# Patient Record
Sex: FEMALE | Race: WHITE | Employment: FULL TIME | URBAN - NONMETROPOLITAN AREA
[De-identification: names, ages, dates, MRNs, and addresses within clinical notes are randomized per-mention and may not be internally consistent; named-entity substitution may affect disease eponyms.]

---

## 2017-03-06 ENCOUNTER — HOSPITAL ENCOUNTER (OUTPATIENT)
Dept: WOMENS IMAGING | Age: 47
Discharge: HOME | End: 2017-03-06
Payer: COMMERCIAL

## 2017-03-06 DIAGNOSIS — Z12.31 ENCOUNTER FOR SCREENING MAMMOGRAM FOR MALIGNANT NEOPLASM OF BREAST: ICD-10-CM

## 2017-03-06 PROCEDURE — 77063 BREAST TOMOSYNTHESIS BI: CPT

## 2017-12-06 ENCOUNTER — OFFICE VISIT (OUTPATIENT)
Dept: RETAIL CLINIC | Facility: CLINIC | Age: 47
End: 2017-12-06

## 2017-12-06 VITALS
WEIGHT: 171 LBS | SYSTOLIC BLOOD PRESSURE: 118 MMHG | TEMPERATURE: 97.7 F | RESPIRATION RATE: 20 BRPM | OXYGEN SATURATION: 98 % | HEIGHT: 70 IN | HEART RATE: 68 BPM | BODY MASS INDEX: 24.48 KG/M2 | DIASTOLIC BLOOD PRESSURE: 68 MMHG

## 2017-12-06 DIAGNOSIS — J06.9 VIRAL UPPER RESPIRATORY TRACT INFECTION: Primary | ICD-10-CM

## 2017-12-06 LAB
EXPIRATION DATE: NORMAL
FLUAV AG NPH QL: NEGATIVE
FLUBV AG NPH QL: NEGATIVE
INTERNAL CONTROL: NORMAL
Lab: NORMAL

## 2017-12-06 PROCEDURE — 99203 OFFICE O/P NEW LOW 30 MIN: CPT | Performed by: NURSE PRACTITIONER

## 2017-12-06 PROCEDURE — 87804 INFLUENZA ASSAY W/OPTIC: CPT | Performed by: NURSE PRACTITIONER

## 2017-12-06 RX ORDER — HYDROXYCHLOROQUINE SULFATE 200 MG/1
200 TABLET, FILM COATED ORAL NIGHTLY
COMMUNITY

## 2017-12-06 RX ORDER — BROMPHENIRAMINE MALEATE, PSEUDOEPHEDRINE HYDROCHLORIDE, AND DEXTROMETHORPHAN HYDROBROMIDE 2; 30; 10 MG/5ML; MG/5ML; MG/5ML
5 SYRUP ORAL 4 TIMES DAILY PRN
Qty: 118 ML | Refills: 0 | Status: SHIPPED | OUTPATIENT
Start: 2017-12-06 | End: 2018-05-14

## 2017-12-06 NOTE — PATIENT INSTRUCTIONS
"Upper Respiratory Infection, Adult  Most upper respiratory infections (URIs) are a viral infection of the air passages leading to the lungs. A URI affects the nose, throat, and upper air passages. The most common type of URI is nasopharyngitis and is typically referred to as \"the common cold.\"  URIs run their course and usually go away on their own. Most of the time, a URI does not require medical attention, but sometimes a bacterial infection in the upper airways can follow a viral infection. This is called a secondary infection. Sinus and middle ear infections are common types of secondary upper respiratory infections.  Bacterial pneumonia can also complicate a URI. A URI can worsen asthma and chronic obstructive pulmonary disease (COPD). Sometimes, these complications can require emergency medical care and may be life threatening.   CAUSES  Almost all URIs are caused by viruses. A virus is a type of germ and can spread from one person to another.   RISKS FACTORS  You may be at risk for a URI if:   · You smoke.    · You have chronic heart or lung disease.  · You have a weakened defense (immune) system.    · You are very young or very old.    · You have nasal allergies or asthma.  · You work in crowded or poorly ventilated areas.  · You work in health care facilities or schools.  SIGNS AND SYMPTOMS   Symptoms typically develop 2-3 days after you come in contact with a cold virus. Most viral URIs last 7-10 days. However, viral URIs from the influenza virus (flu virus) can last 14-18 days and are typically more severe. Symptoms may include:   · Runny or stuffy (congested) nose.    · Sneezing.    · Cough.    · Sore throat.    · Headache.    · Fatigue.    · Fever.    · Loss of appetite.    · Pain in your forehead, behind your eyes, and over your cheekbones (sinus pain).  · Muscle aches.    DIAGNOSIS   Your health care provider may diagnose a URI by:  · Physical exam.  · Tests to check that your symptoms are not due to " another condition such as:  ¨ Strep throat.  ¨ Sinusitis.  ¨ Pneumonia.  ¨ Asthma.  TREATMENT   A URI goes away on its own with time. It cannot be cured with medicines, but medicines may be prescribed or recommended to relieve symptoms. Medicines may help:  · Reduce your fever.  · Reduce your cough.  · Relieve nasal congestion.  HOME CARE INSTRUCTIONS   · Take medicines only as directed by your health care provider.    · Gargle warm saltwater or take cough drops to comfort your throat as directed by your health care provider.  · Use a warm mist humidifier or inhale steam from a shower to increase air moisture. This may make it easier to breathe.  · Drink enough fluid to keep your urine clear or pale yellow.    · Eat soups and other clear broths and maintain good nutrition.    · Rest as needed.    · Return to work when your temperature has returned to normal or as your health care provider advises. You may need to stay home longer to avoid infecting others. You can also use a face mask and careful hand washing to prevent spread of the virus.  · Increase the usage of your inhaler if you have asthma.    · Do not use any tobacco products, including cigarettes, chewing tobacco, or electronic cigarettes. If you need help quitting, ask your health care provider.  PREVENTION   The best way to protect yourself from getting a cold is to practice good hygiene.   · Avoid oral or hand contact with people with cold symptoms.    · Wash your hands often if contact occurs.    There is no clear evidence that vitamin C, vitamin E, echinacea, or exercise reduces the chance of developing a cold. However, it is always recommended to get plenty of rest, exercise, and practice good nutrition.   SEEK MEDICAL CARE IF:   · You are getting worse rather than better.    · Your symptoms are not controlled by medicine.    · You have chills.  · You have worsening shortness of breath.  · You have brown or red mucus.  · You have yellow or brown nasal  discharge.  · You have pain in your face, especially when you bend forward.  · You have a fever.  · You have swollen neck glands.  · You have pain while swallowing.  · You have white areas in the back of your throat.  SEEK IMMEDIATE MEDICAL CARE IF:   · You have severe or persistent:    Headache.    Ear pain.    Sinus pain.    Chest pain.  · You have chronic lung disease and any of the following:    Wheezing.    Prolonged cough.    Coughing up blood.    A change in your usual mucus.  · You have a stiff neck.  · You have changes in your:    Vision.    Hearing.    Thinking.    Mood.  MAKE SURE YOU:   · Understand these instructions.  · Will watch your condition.  · Will get help right away if you are not doing well or get worse.     This information is not intended to replace advice given to you by your health care provider. Make sure you discuss any questions you have with your health care provider.     Document Released: 06/13/2002 Document Revised: 05/03/2016 Document Reviewed: 03/25/2015  [employer] Interactive Patient Education ©2017 [employer]    Follow up if symptoms worsen or persist.

## 2017-12-06 NOTE — PROGRESS NOTES
Chief Complaint   Patient presents with   • URI     Subjective   Yusra Fisher is a 47 y.o. female who presents to the clinic today with complaints cold symptoms. She reports the worse symptoms if throat pain feels like glass and needs something to help the pain. The chloraseptic did not help pain.   URI    This is a new problem. The current episode started yesterday. The problem has been gradually worsening. There has been no fever. Associated symptoms include ear pain, headaches, nausea, a plugged ear sensation, rhinorrhea, sinus pain and a sore throat. Pertinent negatives include no abdominal pain, chest pain, coughing, diarrhea, dysuria, joint pain, joint swelling, neck pain, sneezing, swollen glands, vomiting or wheezing. She has tried NSAIDs and decongestant for the symptoms. The treatment provided mild relief.         Current Outpatient Prescriptions:   •  hydroxychloroquine (PLAQUENIL) 200 MG tablet, Take 200 mg by mouth Daily., Disp: , Rfl:   •  brompheniramine-pseudoephedrine-DM 30-2-10 MG/5ML syrup, Take 5 mL by mouth 4 (Four) Times a Day As Needed for Congestion or Cough., Disp: 118 mL, Rfl: 0  •  lidocaine viscous (XYLOCAINE) 2 % solution, Take 5 mL by mouth As Needed for Mild Pain  or Moderate Pain . Gargle and swallow., Disp: 100 mL, Rfl: 0  •  progesterone (ENDOMETRIN) 100 MG vaginal insert, , Disp: , Rfl:   •  Thyroid 260 MG tablet, Take  by mouth., Disp: , Rfl:     Allergies:  Penicillins    Past Medical History:   Diagnosis Date   • Arthritis    • Disease of thyroid gland    • Hypothyroidism    • RA (rheumatoid arthritis)      History reviewed. No pertinent surgical history.  Family History   Problem Relation Age of Onset   • No Known Problems Mother    • Diabetes Father    • Cancer Maternal Grandfather      Social History   Substance Use Topics   • Smoking status: Never Smoker   • Smokeless tobacco: Never Used   • Alcohol use No       Review of Systems  Review of Systems   HENT: Positive for  "ear pain, rhinorrhea, sinus pain and sore throat. Negative for sneezing.    Respiratory: Negative for cough and wheezing.    Cardiovascular: Negative for chest pain.   Gastrointestinal: Positive for nausea. Negative for abdominal pain, diarrhea and vomiting.   Genitourinary: Negative for dysuria.   Musculoskeletal: Negative for joint pain and neck pain.   Neurological: Positive for headaches.       Objective   /68 (BP Location: Left arm, Patient Position: Sitting, Cuff Size: Adult)  Pulse 68  Temp 97.7 °F (36.5 °C) (Oral)   Resp 20  Ht 177.8 cm (70\")  Wt 77.6 kg (171 lb)  LMP 08/06/2017  SpO2 98%  Breastfeeding? No  BMI 24.54 kg/m2      Physical Exam   Constitutional: She is oriented to person, place, and time. She appears well-developed and well-nourished.   HENT:   Head: Normocephalic and atraumatic.   Right Ear: Hearing, external ear and ear canal normal. Tympanic membrane is bulging.   Left Ear: Hearing, external ear and ear canal normal. Tympanic membrane is bulging.   Nose: Mucosal edema and rhinorrhea present. Right sinus exhibits no maxillary sinus tenderness and no frontal sinus tenderness. Left sinus exhibits no maxillary sinus tenderness and no frontal sinus tenderness.   Mouth/Throat: Uvula is midline and mucous membranes are normal. Posterior oropharyngeal erythema (large amount thin clear secretions. ) present. No oropharyngeal exudate or posterior oropharyngeal edema. Tonsils are 1+ on the right. Tonsils are 1+ on the left. No tonsillar exudate.   Eyes: Pupils are equal, round, and reactive to light.   Neck: Normal range of motion. Neck supple.   Cardiovascular: Normal rate, regular rhythm and normal heart sounds.  Exam reveals no gallop and no friction rub.    No murmur heard.  Pulmonary/Chest: Effort normal and breath sounds normal. No stridor. No respiratory distress. She has no wheezes. She has no rales. She exhibits no tenderness.   Lymphadenopathy:     She has no cervical " adenopathy.   Neurological: She is alert and oriented to person, place, and time.   Skin: Skin is warm and dry.   Psychiatric: She has a normal mood and affect. Her behavior is normal.   Vitals reviewed.      Assessment/Plan     Yusra was seen today for uri.    Diagnoses and all orders for this visit:    Viral upper respiratory tract infection  -     POCT Influenza A/B    Other orders  -     lidocaine viscous (XYLOCAINE) 2 % solution; Take 5 mL by mouth As Needed for Mild Pain  or Moderate Pain . Gargle and swallow.  -     brompheniramine-pseudoephedrine-DM 30-2-10 MG/5ML syrup; Take 5 mL by mouth 4 (Four) Times a Day As Needed for Congestion or Cough.  Negative flu test   Follow up if symptoms worsne or persist

## 2018-03-12 ENCOUNTER — HOSPITAL ENCOUNTER (OUTPATIENT)
Dept: WOMENS IMAGING | Age: 48
Discharge: HOME | End: 2018-03-12
Payer: COMMERCIAL

## 2018-03-12 DIAGNOSIS — Z12.31 ENCOUNTER FOR SCREENING MAMMOGRAM FOR BREAST CANCER: ICD-10-CM

## 2018-03-12 PROCEDURE — 77063 BREAST TOMOSYNTHESIS BI: CPT

## 2018-03-14 ENCOUNTER — OFFICE VISIT (OUTPATIENT)
Dept: RETAIL CLINIC | Facility: CLINIC | Age: 48
End: 2018-03-14

## 2018-03-14 VITALS
DIASTOLIC BLOOD PRESSURE: 64 MMHG | SYSTOLIC BLOOD PRESSURE: 114 MMHG | BODY MASS INDEX: 23.05 KG/M2 | HEIGHT: 70 IN | OXYGEN SATURATION: 98 % | HEART RATE: 84 BPM | RESPIRATION RATE: 16 BRPM | TEMPERATURE: 97.6 F | WEIGHT: 161 LBS

## 2018-03-14 DIAGNOSIS — H69.83 EUSTACHIAN TUBE DYSFUNCTION, BILATERAL: Primary | ICD-10-CM

## 2018-03-14 PROCEDURE — 99213 OFFICE O/P EST LOW 20 MIN: CPT | Performed by: NURSE PRACTITIONER

## 2018-03-14 PROCEDURE — 96372 THER/PROPH/DIAG INJ SC/IM: CPT | Performed by: NURSE PRACTITIONER

## 2018-03-14 RX ORDER — DEXAMETHASONE SODIUM PHOSPHATE 4 MG/ML
8 INJECTION, SOLUTION INTRA-ARTICULAR; INTRALESIONAL; INTRAMUSCULAR; INTRAVENOUS; SOFT TISSUE ONCE
Status: COMPLETED | OUTPATIENT
Start: 2018-03-14 | End: 2018-03-14

## 2018-03-14 RX ADMIN — DEXAMETHASONE SODIUM PHOSPHATE 8 MG: 4 INJECTION, SOLUTION INTRA-ARTICULAR; INTRALESIONAL; INTRAMUSCULAR; INTRAVENOUS; SOFT TISSUE at 10:15

## 2018-03-14 NOTE — PATIENT INSTRUCTIONS
Eustachian Tube Dysfunction  The eustachian tube connects the middle ear to the back of the nose. It regulates air pressure in the middle ear by allowing air to move between the ear and nose. It also helps to drain fluid from the middle ear space. When the eustachian tube does not function properly, air pressure, fluid, or both can build up in the middle ear.  Eustachian tube dysfunction can affect one or both ears.  What are the causes?  This condition happens when the eustachian tube becomes blocked or cannot open normally. This may result from:  · Ear infections.  · Colds and other upper respiratory infections.  · Allergies.  · Irritation, such as from cigarette smoke or acid from the stomach coming up into the esophagus (gastroesophageal reflux).  · Sudden changes in air pressure, such as from descending in an airplane.  · Abnormal growths in the nose or throat, such as nasal polyps, tumors, or enlarged tissue at the back of the throat (adenoids).  What increases the risk?  This condition may be more likely to develop in people who smoke and people who are overweight. Eustachian tube dysfunction may also be more likely to develop in children, especially children who have:  · Certain birth defects of the mouth, such as cleft palate.  · Large tonsils and adenoids.  What are the signs or symptoms?  Symptoms of this condition may include:  · A feeling of fullness in the ear.  · Ear pain.  · Clicking or popping noises in the ear.  · Ringing in the ear.  · Hearing loss.  · Loss of balance.  Symptoms may get worse when the air pressure around you changes, such as when you travel to an area of high elevation or fly on an airplane.  How is this diagnosed?  This condition may be diagnosed based on:  · Your symptoms.  · A physical exam of your ear, nose, and throat.  · Tests, such as those that measure:  ¨ The movement of your eardrum (tympanogram).  ¨ Your hearing (audiometry).  How is this treated?  Treatment depends on  "the cause and severity of your condition. If your symptoms are mild, you may be able to relieve your symptoms by moving air into (\"popping\") your ears. If you have symptoms of fluid in your ears, treatment may include:  · Decongestants.  · Antihistamines.  · Nasal sprays or ear drops that contain medicines that reduce swelling (steroids).  In some cases, you may need to have a procedure to drain the fluid in your eardrum (myringotomy). In this procedure, a small tube is placed in the eardrum to:  · Drain the fluid.  · Restore the air in the middle ear space.  Follow these instructions at home:  · Take over-the-counter and prescription medicines only as told by your health care provider.  · Use techniques to help pop your ears as recommended by your health care provider. These may include:  ¨ Chewing gum.  ¨ Yawning.  ¨ Frequent, forceful swallowing.  ¨ Closing your mouth, holding your nose closed, and gently blowing as if you are trying to blow air out of your nose.  · Do not do any of the following until your health care provider approves:  ¨ Travel to high altitudes.  ¨ Fly in airplanes.  ¨ Work in a pressurized cabin or room.  ¨ Scuba dive.  · Keep your ears dry. Dry your ears completely after showering or bathing.  · Do not smoke.  · Keep all follow-up visits as told by your health care provider. This is important.  Contact a health care provider if:  · Your symptoms do not go away after treatment.  · Your symptoms come back after treatment.  · You are unable to pop your ears.  · You have:  ¨ A fever.  ¨ Pain in your ear.  ¨ Pain in your head or neck.  ¨ Fluid draining from your ear.  · Your hearing suddenly changes.  · You become very dizzy.  · You lose your balance.  This information is not intended to replace advice given to you by your health care provider. Make sure you discuss any questions you have with your health care provider.  Document Released: 01/13/2017 Document Revised: 05/25/2017 Document " Reviewed: 01/06/2016  [employer] Interactive Patient Education © 2017 [employer]

## 2018-03-14 NOTE — PROGRESS NOTES
"Subjective   Yusra Fisher is a 48 y.o. female.     Ear Fullness    There is pain in both ears. This is a new problem. The current episode started 1 to 4 weeks ago (2 weeks). The problem occurs constantly. The problem has been unchanged. There has been no fever. Associated symptoms include hearing loss (feels full; popping in and out). Pertinent negatives include no coughing, ear discharge, headaches, neck pain or sore throat. Treatments tried: Aleve D for sinus. The treatment provided no relief.        The following portions of the patient's history were reviewed and updated as appropriate: allergies, current medications, past family history, past medical history, past social history, past surgical history and problem list.    Review of Systems   Constitutional: Negative for fever.   HENT: Positive for hearing loss (feels full; popping in and out). Negative for congestion, ear discharge, ear pain, facial swelling and sore throat.    Eyes: Negative for visual disturbance.   Respiratory: Negative for cough.    Cardiovascular: Negative for chest pain.   Musculoskeletal: Negative for neck pain and neck stiffness.   Neurological: Negative for dizziness and headaches.       Objective      /64   Pulse 84   Temp 97.6 °F (36.4 °C) (Oral)   Resp 16   Ht 177.8 cm (70\")   Wt 73 kg (161 lb)   SpO2 98%   BMI 23.10 kg/m²     Physical Exam   Constitutional: She is oriented to person, place, and time. She appears well-developed and well-nourished.   HENT:   Head: Normocephalic.   Right Ear: External ear normal. Decreased hearing is noted.   Left Ear: External ear normal.   Nose: Nose normal.   Mouth/Throat: Oropharynx is clear and moist.   Right TM dull, but not infected   Eyes: Conjunctivae are normal. Pupils are equal, round, and reactive to light.   Neck: Normal range of motion. Neck supple.   Cardiovascular: Normal rate.    Pulmonary/Chest: Effort normal.   Musculoskeletal: Normal range of motion. "   Lymphadenopathy:     She has no cervical adenopathy.   Neurological: She is alert and oriented to person, place, and time.   Skin: Skin is warm and dry. Capillary refill takes less than 2 seconds.   Psychiatric: She has a normal mood and affect. Her behavior is normal. Judgment and thought content normal.   Nursing note and vitals reviewed.        Assessment/Plan   Yusra was seen today for ear fullness.    Diagnoses and all orders for this visit:    Eustachian tube dysfunction, bilateral  -     dexamethasone (DECADRON) injection 8 mg; Inject 2 mL into the shoulder, thigh, or buttocks 1 (One) Time.      Continue Aleve D for sudafed component.  Add flonase.  Return to see your Primary Care Provider if not improved in 1 week.    LISA Torres

## 2018-05-14 ENCOUNTER — PROCEDURE VISIT (OUTPATIENT)
Dept: OTOLARYNGOLOGY | Facility: CLINIC | Age: 48
End: 2018-05-14

## 2018-05-14 ENCOUNTER — OFFICE VISIT (OUTPATIENT)
Dept: OTOLARYNGOLOGY | Facility: CLINIC | Age: 48
End: 2018-05-14

## 2018-05-14 VITALS
BODY MASS INDEX: 23.19 KG/M2 | SYSTOLIC BLOOD PRESSURE: 118 MMHG | DIASTOLIC BLOOD PRESSURE: 70 MMHG | WEIGHT: 162 LBS | TEMPERATURE: 97.8 F | HEIGHT: 70 IN

## 2018-05-14 DIAGNOSIS — H90.5 SNHL (SENSORY-NEURAL HEARING LOSS), UNILATERAL: Primary | ICD-10-CM

## 2018-05-14 DIAGNOSIS — H90.42 SENSORINEURAL HEARING LOSS (SNHL) OF LEFT EAR WITH UNRESTRICTED HEARING OF RIGHT EAR: Primary | ICD-10-CM

## 2018-05-14 DIAGNOSIS — H69.83 DYSFUNCTION OF BOTH EUSTACHIAN TUBES: ICD-10-CM

## 2018-05-14 PROBLEM — H69.93 DYSFUNCTION OF BOTH EUSTACHIAN TUBES: Status: ACTIVE | Noted: 2018-05-14

## 2018-05-14 PROCEDURE — 92553 AUDIOMETRY AIR & BONE: CPT | Performed by: PHYSICIAN ASSISTANT

## 2018-05-14 PROCEDURE — 92567 TYMPANOMETRY: CPT | Performed by: PHYSICIAN ASSISTANT

## 2018-05-14 PROCEDURE — 99203 OFFICE O/P NEW LOW 30 MIN: CPT | Performed by: PHYSICIAN ASSISTANT

## 2018-05-14 RX ORDER — MONTELUKAST SODIUM 10 MG/1
TABLET ORAL
COMMUNITY
Start: 2018-05-05 | End: 2018-05-14

## 2018-05-14 RX ORDER — AZELASTINE 1 MG/ML
2 SPRAY, METERED NASAL 2 TIMES DAILY
Qty: 30 ML | Refills: 11 | Status: SHIPPED | OUTPATIENT
Start: 2018-05-14 | End: 2019-05-17 | Stop reason: SDUPTHER

## 2018-05-14 RX ORDER — FLUTICASONE PROPIONATE 50 MCG
2 SPRAY, SUSPENSION (ML) NASAL DAILY
Qty: 16 G | Refills: 11 | Status: SHIPPED | OUTPATIENT
Start: 2018-05-14 | End: 2019-05-17 | Stop reason: SDUPTHER

## 2018-05-14 NOTE — PROGRESS NOTES
YOB: 1970  Location: [facility]  Location Address: [address] [place] [address], [facility], [address] [place] [address]  Location Phone: [phone]    Chief Complaint   Patient presents with   • Ear Problem       History of Present Illness  Yusra Fisher is a 48 y.o. female.  Yusra Fisher is here for evaluation of ENT complaints. The patient has had problems with ear fullness, popping and cracking of the ear and muffled hearing  The symptoms are localized to both ears. The patient has had variable symptoms. The symptoms have been present for the last several months The symptoms are aggravated by  no identifiable factors. The symptoms are improved by no identifiable factors.          Progress Notes  Encounter Date: 2018 9:30 AM  Ronit Avina   Audiology      []Manual[]Template  [x]Copied  [x]Ronit Avina    []Hover for attribution information            Past Medical History:   Diagnosis Date   • Arthritis    • Disease of thyroid gland    • Hypothyroidism    • RA (rheumatoid arthritis)        History reviewed. No pertinent surgical history.    Outpatient Prescriptions Marked as Taking for the 18 encounter (Office Visit) with SARA Helm   Medication Sig Dispense Refill   • hydroxychloroquine (PLAQUENIL) 200 MG tablet Take 200 mg by mouth Daily.     • progesterone (ENDOMETRIN) 100 MG vaginal insert      • Thyroid 260 MG tablet Take  by mouth.     • [DISCONTINUED] montelukast (SINGULAIR) 10 MG tablet          Penicillins    Family History   Problem Relation Age of Onset   • No Known Problems Mother    • Diabetes Father    • Cancer Maternal Grandfather        Social History     Social History   • Marital status:      Spouse name: N/A   • Number of children: N/A   • Years of education: N/A     Occupational History   • Not on file.     Social History Main Topics   • Smoking status: Never Smoker   • Smokeless tobacco: Never Used   • Alcohol use No   • Drug use: No   • Sexual  activity: Yes      Comment: [employer]     Other Topics Concern   • Not on file     Social History Narrative   • No narrative on file       Review of Systems   Constitutional: Negative for activity change, appetite change, chills, diaphoresis, fatigue, fever and unexpected weight change.   HENT: Positive for hearing loss (intermittent muffled hearing, fullness, popping and cracking). Negative for congestion, dental problem, drooling, ear discharge, ear pain, facial swelling, mouth sores, nosebleeds, postnasal drip, rhinorrhea, sinus pressure, sneezing, sore throat, tinnitus, trouble swallowing and voice change.    Eyes: Negative.    Respiratory: Negative.    Cardiovascular: Negative.    Gastrointestinal: Negative.    Endocrine: Negative.    Skin: Negative.    Allergic/Immunologic: Negative for environmental allergies, food allergies and immunocompromised state.   Neurological: Negative.    Hematological: Negative.    Psychiatric/Behavioral: Negative.        Vitals:    05/14/18 1003   BP: 118/70   Temp: 97.8 °F (36.6 °C)       Body mass index is 23.24 kg/m².    Objective     Physical Exam  CONSTITUTIONAL: well nourished, alert, oriented, in no acute distress     COMMUNICATION AND VOICE: able to communicate normally, normal voice quality    HEAD: normocephalic, no lesions, atraumatic, no tenderness, no masses     FACE: appearance normal, no lesions, no tenderness, no deformities, facial motion symmetric    SALIVARY GLANDS: parotid glands with no tenderness, no swelling, no masses, submandibular glands with normal size, nontender    EYES: ocular motility normal, eyelids normal, orbits normal, no proptosis, conjunctiva normal , pupils equal, round     EARS:  Hearing: response to conversational voice normal bilaterally   External Ears: auricles without lesions  Otoscopic: tympanic membrane appearance normal, no lesions, no perforation, normal mobility, no fluid    NOSE:  External Nose: structure normal, no tenderness on  palpation, no nasal discharge, no lesions, no evidence of trauma, nostrils patent   Intranasal Exam: nasal mucosa normal, vestibule within normal limits, inferior turbinate normal, nasal septum midline   Nasopharynx:     ORAL:  Lips: upper and lower lips without lesion   Teeth: dentition within normal limits for age   Gums: gingivae healthy   Oral Mucosa: oral mucosa normal, no mucosal lesions   Floor of Mouth: Warthin’s duct patent, mucosa normal  Tongue: lingual mucosa normal without lesions, normal tongue mobility   Palate: soft and hard palates with normal mucosa and structure  Oropharynx: oropharyngeal mucosa normal    NECK: neck appearance normal, no mass,  noted without erythema or tenderness    THYROID: no overt thyromegaly, no tenderness, nodules or mass present on palpation, position midline     LYMPH NODES: no lymphadenopathy    CHEST/RESPIRATORY: respiratory effort normal, normal breath sounds     CARDIOVASCULAR: rate and rhythm normal, extremities without cyanosis or edema      NEUROLOGIC/PSYCHIATRIC: oriented to time, place and person, mood normal, affect appropriate, CN II-XII intact grossly    Assessment/Plan   Problems Addressed this Visit        Nervous and Auditory    Dysfunction of both eustachian tubes    Relevant Medications    fluticasone (FLONASE) 50 MCG/ACT nasal spray    azelastine (ASTELIN) 0.1 % nasal spray    Other Relevant Orders    Comprehensive Hearing Test       Other    Sensorineural hearing loss (SNHL) of left ear with unrestricted hearing of right ear - Primary    Relevant Orders    Comprehensive Hearing Test      Other Visit Diagnoses    None.       * Surgery not found *  Orders Placed This Encounter   Procedures   • Comprehensive Hearing Test     Standing Status:   Future     Standing Expiration Date:   5/15/2019     Order Specific Question:   Laterality     Answer:   Bilateral     Return in about 1 year (around 5/14/2019) for Recheck ears with audiogram.       Patient  Instructions   Will start Flonase and Astelin, follow-up in one year with audiogram. If symptoms worsen advised to call and try to have patient come in during symptoms.

## 2018-05-14 NOTE — PATIENT INSTRUCTIONS
Will start Flonase and Astelin, follow-up in one year with audiogram. If symptoms worsen advised to call and try to have patient come in during symptoms.

## 2018-05-16 ENCOUNTER — TELEPHONE (OUTPATIENT)
Dept: OBGYN | Age: 48
End: 2018-05-16

## 2018-05-18 ENCOUNTER — TELEPHONE (OUTPATIENT)
Dept: OBGYN | Age: 48
End: 2018-05-18

## 2018-11-09 ENCOUNTER — OFFICE VISIT (OUTPATIENT)
Dept: RETAIL CLINIC | Facility: CLINIC | Age: 48
End: 2018-11-09

## 2018-11-09 VITALS
OXYGEN SATURATION: 98 % | HEART RATE: 75 BPM | TEMPERATURE: 98.6 F | SYSTOLIC BLOOD PRESSURE: 124 MMHG | DIASTOLIC BLOOD PRESSURE: 72 MMHG

## 2018-11-09 DIAGNOSIS — J06.9 ACUTE URI: Primary | ICD-10-CM

## 2018-11-09 PROCEDURE — 87804 INFLUENZA ASSAY W/OPTIC: CPT | Performed by: NURSE PRACTITIONER

## 2018-11-09 PROCEDURE — 99213 OFFICE O/P EST LOW 20 MIN: CPT | Performed by: NURSE PRACTITIONER

## 2018-11-09 RX ORDER — AZITHROMYCIN 250 MG/1
TABLET, FILM COATED ORAL
Qty: 6 TABLET | Refills: 0 | Status: SHIPPED | OUTPATIENT
Start: 2018-11-09 | End: 2019-09-27

## 2018-11-09 RX ORDER — BENZONATATE 100 MG/1
100 CAPSULE ORAL 3 TIMES DAILY PRN
Qty: 15 CAPSULE | Refills: 0 | Status: SHIPPED | OUTPATIENT
Start: 2018-11-09 | End: 2019-09-27

## 2018-11-09 RX ORDER — CETIRIZINE HYDROCHLORIDE, PSEUDOEPHEDRINE HYDROCHLORIDE 5; 120 MG/1; MG/1
1 TABLET, FILM COATED, EXTENDED RELEASE ORAL 2 TIMES DAILY
Qty: 28 TABLET | Refills: 0 | Status: SHIPPED | OUTPATIENT
Start: 2018-11-09 | End: 2018-11-23

## 2018-11-09 NOTE — PROGRESS NOTES
Subjective   Yusra Fisher is a 48 y.o. female.     Sore Throat    This is a new problem. The current episode started in the past 7 days (started on Monday). The problem has been gradually worsening. There has been no fever. Associated symptoms include congestion (Yellow this morning; Little blood), coughing and ear pain. Pertinent negatives include no diarrhea or vomiting. Treatments tried: Mucinex-DM; nasal spray.    Patient has a premature grandson that lives with her and is wanting to make sure she isn't contagious.     The following portions of the patient's history were reviewed and updated as appropriate: allergies, current medications, past family history, past medical history, past social history, past surgical history and problem list.    Review of Systems   Constitutional: Negative for chills and fever.   HENT: Positive for congestion (Yellow this morning; Little blood), ear pain, postnasal drip and sore throat.    Respiratory: Positive for cough.    Gastrointestinal: Negative for diarrhea, nausea and vomiting.   Musculoskeletal: Positive for myalgias (This morning when she woke up).   Allergic/Immunologic: Negative for environmental allergies.       Objective   Physical Exam   Constitutional: She appears well-developed and well-nourished. She does not appear ill. No distress.   HENT:   Right Ear: External ear normal. Tympanic membrane is scarred. Tympanic membrane is not erythematous.   Left Ear: External ear normal. Tympanic membrane is scarred. Tympanic membrane is not erythematous.   Nose: Right sinus exhibits no maxillary sinus tenderness and no frontal sinus tenderness. Left sinus exhibits no maxillary sinus tenderness and no frontal sinus tenderness.   Mouth/Throat: No oropharyngeal exudate or posterior oropharyngeal erythema. Tonsils are 1+ on the right. Tonsils are 1+ on the left. No tonsillar exudate.   Neck: Neck supple.   Cardiovascular: Normal rate, regular rhythm and normal heart sounds.   Exam reveals no gallop and no friction rub.    No murmur heard.  Pulmonary/Chest: Effort normal and breath sounds normal. No respiratory distress. She has no decreased breath sounds. She has no wheezes. She has no rhonchi. She has no rales. She exhibits no tenderness.   Lymphadenopathy:     She has no cervical adenopathy.   Neurological: She is alert.   Skin: Skin is warm and dry. She is not diaphoretic.   Psychiatric: She has a normal mood and affect. Her behavior is normal.         Assessment/Plan   Yusra was seen today for sore throat.    Diagnoses and all orders for this visit:    Acute URI  -     POC Influenza A / B  -     benzonatate (TESSALON PERLES) 100 MG capsule; Take 1 capsule by mouth 3 (Three) Times a Day As Needed for Cough.  -     cetirizine-pseudoephedrine (ZyrTEC-D) 5-120 MG per 12 hr tablet; Take 1 tablet by mouth 2 (Two) Times a Day for 14 days.    Other orders  -     azithromycin (ZITHROMAX Z-ORIANA) 250 MG tablet; Take 2 tablets the first day, then 1 tablet daily for 4 days.    Flu negative.    Good hand washing; wipe surfaces, cover your mouth when you cough and sneeze  If you develop fever return for recheck and stay away from grandson.   Watch and wait on antibiotic over next 2-3 days.  Given due to immunosuppressant therapy and seeing some blood in mucus.  At risk for developing secondary bacterial sinusitis.     Increase fluid intake  Saline nasal sprays and sinus rinses are good to keep membranes moist  Warm salt water gargles as needed for sore throat  Do not over suppress cough  If no improvement over next 2-3 days or symptoms worsen, follow up with PCP

## 2018-11-09 NOTE — PATIENT INSTRUCTIONS
"Increase fluid intake  Saline nasal sprays and sinus rinses are good to keep membranes moist  Warm salt water gargles as needed for sore throat  Do not over suppress cough  If no improvement over next 2-3 days or symptoms worsen, follow up with PCP        Upper Respiratory Infection, Adult  Most upper respiratory infections (URIs) are a viral infection of the air passages leading to the lungs. A URI affects the nose, throat, and upper air passages. The most common type of URI is nasopharyngitis and is typically referred to as \"the common cold.\"  URIs run their course and usually go away on their own. Most of the time, a URI does not require medical attention, but sometimes a bacterial infection in the upper airways can follow a viral infection. This is called a secondary infection. Sinus and middle ear infections are common types of secondary upper respiratory infections.  Bacterial pneumonia can also complicate a URI. A URI can worsen asthma and chronic obstructive pulmonary disease (COPD). Sometimes, these complications can require emergency medical care and may be life threatening.  What are the causes?  Almost all URIs are caused by viruses. A virus is a type of germ and can spread from one person to another.  What increases the risk?  You may be at risk for a URI if:  · You smoke.  · You have chronic heart or lung disease.  · You have a weakened defense (immune) system.  · You are very young or very old.  · You have nasal allergies or asthma.  · You work in crowded or poorly ventilated areas.  · You work in health care facilities or schools.    What are the signs or symptoms?  Symptoms typically develop 2-3 days after you come in contact with a cold virus. Most viral URIs last 7-10 days. However, viral URIs from the influenza virus (flu virus) can last 14-18 days and are typically more severe. Symptoms may include:  · Runny or stuffy (congested) nose.  · Sneezing.  · Cough.  · Sore " throat.  · Headache.  · Fatigue.  · Fever.  · Loss of appetite.  · Pain in your forehead, behind your eyes, and over your cheekbones (sinus pain).  · Muscle aches.    How is this diagnosed?  Your health care provider may diagnose a URI by:  · Physical exam.  · Tests to check that your symptoms are not due to another condition such as:  ? Strep throat.  ? Sinusitis.  ? Pneumonia.  ? Asthma.    How is this treated?  A URI goes away on its own with time. It cannot be cured with medicines, but medicines may be prescribed or recommended to relieve symptoms. Medicines may help:  · Reduce your fever.  · Reduce your cough.  · Relieve nasal congestion.    Follow these instructions at home:  · Take medicines only as directed by your health care provider.  · Gargle warm saltwater or take cough drops to comfort your throat as directed by your health care provider.  · Use a warm mist humidifier or inhale steam from a shower to increase air moisture. This may make it easier to breathe.  · Drink enough fluid to keep your urine clear or pale yellow.  · Eat soups and other clear broths and maintain good nutrition.  · Rest as needed.  · Return to work when your temperature has returned to normal or as your health care provider advises. You may need to stay home longer to avoid infecting others. You can also use a face mask and careful hand washing to prevent spread of the virus.  · Increase the usage of your inhaler if you have asthma.  · Do not use any tobacco products, including cigarettes, chewing tobacco, or electronic cigarettes. If you need help quitting, ask your health care provider.  How is this prevented?  The best way to protect yourself from getting a cold is to practice good hygiene.  · Avoid oral or hand contact with people with cold symptoms.  · Wash your hands often if contact occurs.    There is no clear evidence that vitamin C, vitamin E, echinacea, or exercise reduces the chance of developing a cold. However, it is  always recommended to get plenty of rest, exercise, and practice good nutrition.  Contact a health care provider if:  · You are getting worse rather than better.  · Your symptoms are not controlled by medicine.  · You have chills.  · You have worsening shortness of breath.  · You have brown or red mucus.  · You have yellow or brown nasal discharge.  · You have pain in your face, especially when you bend forward.  · You have a fever.  · You have swollen neck glands.  · You have pain while swallowing.  · You have white areas in the back of your throat.  Get help right away if:  · You have severe or persistent:  ? Headache.  ? Ear pain.  ? Sinus pain.  ? Chest pain.  · You have chronic lung disease and any of the following:  ? Wheezing.  ? Prolonged cough.  ? Coughing up blood.  ? A change in your usual mucus.  · You have a stiff neck.  · You have changes in your:  ? Vision.  ? Hearing.  ? Thinking.  ? Mood.  This information is not intended to replace advice given to you by your health care provider. Make sure you discuss any questions you have with your health care provider.  Document Released: 06/13/2002 Document Revised: 08/20/2017 Document Reviewed: 03/25/2015  [employer] Interactive Patient Education © 2018 [employer]

## 2019-03-14 ENCOUNTER — HOSPITAL ENCOUNTER (OUTPATIENT)
Dept: WOMENS IMAGING | Age: 49
Discharge: HOME | End: 2019-03-14
Payer: COMMERCIAL

## 2019-03-14 DIAGNOSIS — Z12.31 ENCOUNTER FOR SCREENING MAMMOGRAM FOR MALIGNANT NEOPLASM OF BREAST: ICD-10-CM

## 2019-03-14 PROCEDURE — 77063 BREAST TOMOSYNTHESIS BI: CPT

## 2019-05-17 ENCOUNTER — OFFICE VISIT (OUTPATIENT)
Dept: OTOLARYNGOLOGY | Facility: CLINIC | Age: 49
End: 2019-05-17

## 2019-05-17 ENCOUNTER — PROCEDURE VISIT (OUTPATIENT)
Dept: OTOLARYNGOLOGY | Facility: CLINIC | Age: 49
End: 2019-05-17

## 2019-05-17 VITALS
BODY MASS INDEX: 24.48 KG/M2 | TEMPERATURE: 97.9 F | SYSTOLIC BLOOD PRESSURE: 110 MMHG | WEIGHT: 171 LBS | HEIGHT: 70 IN | DIASTOLIC BLOOD PRESSURE: 70 MMHG

## 2019-05-17 DIAGNOSIS — H69.83 DYSFUNCTION OF BOTH EUSTACHIAN TUBES: ICD-10-CM

## 2019-05-17 DIAGNOSIS — H90.42 SENSORINEURAL HEARING LOSS (SNHL) OF LEFT EAR WITH UNRESTRICTED HEARING OF RIGHT EAR: Primary | ICD-10-CM

## 2019-05-17 DIAGNOSIS — H90.5 SNHL (SENSORY-NEURAL HEARING LOSS), UNILATERAL: ICD-10-CM

## 2019-05-17 PROCEDURE — 99214 OFFICE O/P EST MOD 30 MIN: CPT | Performed by: PHYSICIAN ASSISTANT

## 2019-05-17 PROCEDURE — 92570 ACOUSTIC IMMITANCE TESTING: CPT | Performed by: AUDIOLOGIST-HEARING AID FITTER

## 2019-05-17 PROCEDURE — 92555 SPEECH THRESHOLD AUDIOMETRY: CPT | Performed by: AUDIOLOGIST-HEARING AID FITTER

## 2019-05-17 PROCEDURE — 92551 PURE TONE HEARING TEST AIR: CPT | Performed by: AUDIOLOGIST-HEARING AID FITTER

## 2019-05-17 RX ORDER — AZELASTINE 1 MG/ML
2 SPRAY, METERED NASAL 2 TIMES DAILY
Qty: 30 ML | Refills: 11 | Status: SHIPPED | OUTPATIENT
Start: 2019-05-17 | End: 2020-07-31

## 2019-05-17 RX ORDER — FLUTICASONE PROPIONATE 50 MCG
2 SPRAY, SUSPENSION (ML) NASAL DAILY
Qty: 16 G | Refills: 11 | Status: SHIPPED | OUTPATIENT
Start: 2019-05-17 | End: 2020-01-09 | Stop reason: CLARIF

## 2019-05-20 NOTE — PATIENT INSTRUCTIONS
Will continue nasal sprays and recheck in one year. If symptoms worsen call for sooner appointment and will consider PE tube placement.

## 2019-05-20 NOTE — PROGRESS NOTES
YOB: 1970  Location: [facility]  Location Address: [address] [place] [address], [facility], [address] [place] [address]  Location Phone: [phone]    Chief Complaint   Patient presents with   • Follow-up     ears       History of Present Illness  Yusra Fisher is a 49 y.o. female.  Yusra Fisher is here for evaluation of ENT complaints. The patient has had problems with ear fullness, popping and cracking of the ear and muffled hearing  The symptoms are localized to both ears. The patient has had resolved symptoms. The symptoms have been resolved for the last several months. The symptoms are aggravated by  no identifiable factors. The symptoms are improved by nasal sprays.          Progress Notes  Encounter Date: 2018 9:30 AM  Ronit Avina   Audiology      []Manual[]Template  [x]Copied  [x]Ronit Avina    []Hover for attribution information            Past Medical History:   Diagnosis Date   • Arthritis    • Disease of thyroid gland    • Hypothyroidism    • RA (rheumatoid arthritis) (CMS/[facility])        History reviewed. No pertinent surgical history.    Outpatient Medications Marked as Taking for the 19 encounter (Office Visit) with Uri Ulloa PA   Medication Sig Dispense Refill   • azelastine (ASTELIN) 0.1 % nasal spray 2 sprays into the nostril(s) as directed by provider 2 (Two) Times a Day. Use in each nostril as directed 30 mL 11   • fluticasone (FLONASE) 50 MCG/ACT nasal spray 2 sprays into the nostril(s) as directed by provider Daily. 16 g 11   • hydroxychloroquine (PLAQUENIL) 200 MG tablet Take 200 mg by mouth Daily.     • progesterone (ENDOMETRIN) 100 MG vaginal insert      • Thyroid 260 MG tablet Take  by mouth.     • [DISCONTINUED] azelastine (ASTELIN) 0.1 % nasal spray 2 sprays into each nostril 2 (Two) Times a Day. Use in each nostril as directed 30 mL 11   • [DISCONTINUED] fluticasone (FLONASE) 50 MCG/ACT nasal spray 2 sprays into each nostril Daily. 16 g 11        Penicillins    Family History   Problem Relation Age of Onset   • No Known Problems Mother    • Diabetes Father    • Cancer Maternal Grandfather        Social History     Socioeconomic History   • Marital status:      Spouse name: Not on file   • Number of children: Not on file   • Years of education: Not on file   • Highest education level: Not on file   Tobacco Use   • Smoking status: Never Smoker   • Smokeless tobacco: Never Used   Substance and Sexual Activity   • Alcohol use: No   • Drug use: No   • Sexual activity: Yes     Comment: Essure       Review of Systems   Constitutional: Negative for activity change, appetite change, chills, diaphoresis, fatigue, fever and unexpected weight change.   HENT: Positive for hearing loss (intermittent muffled hearing, fullness, popping and cracking). Negative for congestion, dental problem, drooling, ear discharge, ear pain, facial swelling, mouth sores, nosebleeds, postnasal drip, rhinorrhea, sinus pressure, sneezing, sore throat, tinnitus, trouble swallowing and voice change.         ETD   Eyes: Negative.    Respiratory: Negative.    Cardiovascular: Negative.    Gastrointestinal: Negative.    Endocrine: Negative.    Skin: Negative.    Allergic/Immunologic: Negative for environmental allergies, food allergies and immunocompromised state.   Neurological: Negative.    Hematological: Negative.    Psychiatric/Behavioral: Negative.        Vitals:    05/17/19 0942   BP: 110/70   Temp: 97.9 °F (36.6 °C)       Body mass index is 24.54 kg/m².    Objective     Physical Exam  CONSTITUTIONAL: well nourished, alert, oriented, in no acute distress     COMMUNICATION AND VOICE: able to communicate normally, normal voice quality    HEAD: normocephalic, no lesions, atraumatic, no tenderness, no masses     FACE: appearance normal, no lesions, no tenderness, no deformities, facial motion symmetric    EYES: ocular motility normal, eyelids normal, orbits normal, no proptosis,  conjunctiva normal , pupils equal, round     EARS:  Hearing: response to conversational voice normal bilaterally   External Ears: auricles without lesions  Otoscopic: tympanic membrane appearance normal, no lesions, no perforation, normal mobility, no fluid    NOSE:  External Nose: structure normal, no tenderness on palpation, no nasal discharge, no lesions, no evidence of trauma, nostrils patent   Intranasal Exam: nasal mucosa normal, vestibule within normal limits, inferior turbinate normal, nasal septum midline   Nasopharynx:     ORAL:  Lips: upper and lower lips without lesion   Teeth: dentition within normal limits for age   Gums: gingivae healthy   Oral Mucosa: oral mucosa normal, no mucosal lesions   Floor of Mouth: Warthin’s duct patent, mucosa normal  Tongue: lingual mucosa normal without lesions, normal tongue mobility   Palate: soft and hard palates with normal mucosa and structure  Oropharynx: oropharyngeal mucosa normal    NECK: neck appearance normal    CHEST/RESPIRATORY: respiratory effort normal, normal breath sounds     CARDIOVASCULAR: rate and rhythm normal, extremities without cyanosis or edema      NEUROLOGIC/PSYCHIATRIC: oriented to time, place and person, mood normal, affect appropriate, CN II-XII intact grossly    Assessment/Plan   Problems Addressed this Visit        Nervous and Auditory    Dysfunction of both eustachian tubes    Relevant Medications    fluticasone (FLONASE) 50 MCG/ACT nasal spray    azelastine (ASTELIN) 0.1 % nasal spray       Other    Sensorineural hearing loss (SNHL) of left ear with unrestricted hearing of right ear - Primary        * Surgery not found *  No orders of the defined types were placed in this encounter.    Return in about 1 year (around 5/17/2020) for Recheck ears.       Patient Instructions   Will continue nasal sprays and recheck in one year. If symptoms worsen call for sooner appointment and will consider PE tube placement.

## 2019-09-27 ENCOUNTER — OFFICE VISIT (OUTPATIENT)
Dept: OBSTETRICS AND GYNECOLOGY | Facility: CLINIC | Age: 49
End: 2019-09-27

## 2019-09-27 VITALS
WEIGHT: 153 LBS | SYSTOLIC BLOOD PRESSURE: 124 MMHG | BODY MASS INDEX: 21.9 KG/M2 | DIASTOLIC BLOOD PRESSURE: 82 MMHG | HEIGHT: 70 IN

## 2019-09-27 DIAGNOSIS — N92.4 ABNORMAL PERIMENOPAUSAL BLEEDING: Primary | ICD-10-CM

## 2019-09-27 PROCEDURE — 99202 OFFICE O/P NEW SF 15 MIN: CPT | Performed by: OBSTETRICS & GYNECOLOGY

## 2019-09-27 RX ORDER — FERROUS SULFATE 325(65) MG
325 TABLET ORAL
COMMUNITY
End: 2021-06-02 | Stop reason: ALTCHOICE

## 2019-09-27 RX ORDER — ALPRAZOLAM 0.25 MG/1
TABLET ORAL
COMMUNITY
Start: 2019-08-26 | End: 2021-06-02

## 2019-09-27 RX ORDER — SERTRALINE HYDROCHLORIDE 25 MG/1
25 TABLET, FILM COATED ORAL DAILY
COMMUNITY
Start: 2019-09-21 | End: 2022-11-16

## 2019-09-27 NOTE — PROGRESS NOTES
"Yusra Fisher is a 49 y.o. female here today to discuss menopausal transition.  She is having irregular vaginal bleeding and has been bleeding daily for the last 4 weeks.  She is using a compounded progesterone pill and also receives implantation of testosterone pellets.  Despite this therapy her irregular bleeding persists.  She denies abdominal pain, fevers, and there are no other aggravating or alleviating factors.    Visit Vitals  /82 (BP Location: Left arm, Patient Position: Sitting)   Ht 177.8 cm (70\")   Wt 69.4 kg (153 lb)   BMI 21.95 kg/m²     Pleasant female no acute distress  Mood and affect normal  Breathing unlabored  Abdomen nontender    Assessment: Irregular perimenopausal bleeding    We discussed the menopausal transition and irregular bleeding related to anovulation.  I recommend that she stop her compounded progesterone pill and see if that will prompt a withdrawal bleeding.  If not, she will contact the office for a prescription for a Provera withdrawal.  We also discussed her testosterone pellets, and that nobody in [facility] places those.  I have offered to transition her to testosterone cream, or she may choose to travel to [place] to a provider for placement of pellets.  She will follow-up in 6 weeks to check on her bleeding patterns and call beforehand if she has concerns.        "

## 2019-11-18 ENCOUNTER — OFFICE VISIT (OUTPATIENT)
Dept: OBGYN | Age: 49
End: 2019-11-18
Payer: COMMERCIAL

## 2019-11-18 VITALS
HEART RATE: 59 BPM | WEIGHT: 158 LBS | DIASTOLIC BLOOD PRESSURE: 73 MMHG | SYSTOLIC BLOOD PRESSURE: 122 MMHG | HEIGHT: 70 IN | BODY MASS INDEX: 22.62 KG/M2

## 2019-11-18 DIAGNOSIS — Z80.3 FAMILY HISTORY OF BREAST CANCER: ICD-10-CM

## 2019-11-18 DIAGNOSIS — Z12.39 SCREENING FOR BREAST CANCER: ICD-10-CM

## 2019-11-18 DIAGNOSIS — Z01.419 WOMEN'S ANNUAL ROUTINE GYNECOLOGICAL EXAMINATION: Primary | ICD-10-CM

## 2019-11-18 DIAGNOSIS — R39.9 UTI SYMPTOMS: ICD-10-CM

## 2019-11-18 DIAGNOSIS — Z12.4 SCREENING FOR CERVICAL CANCER: ICD-10-CM

## 2019-11-18 LAB
BACTERIA URINE, POC: NORMAL
BILIRUBIN URINE: 0.2 MG/DL
BLOOD, URINE: NEGATIVE
CASTS URINE, POC: NORMAL
CLARITY: CLEAR
COLOR: YELLOW
CRYSTALS URINE, POC: NORMAL
EPI CELLS URINE, POC: NORMAL
GLUCOSE URINE: NORMAL
KETONES, URINE: NEGATIVE
LEUKOCYTE EST, POC: NORMAL
NITRITE, URINE: NEGATIVE
PH UA: 7 (ref 4.5–8)
PROTEIN UA: NEGATIVE
RBC URINE, POC: NORMAL
SPECIFIC GRAVITY UA: 1.01 (ref 1–1.03)
UROBILINOGEN, URINE: NORMAL
WBC URINE, POC: NORMAL
YEAST URINE, POC: NORMAL

## 2019-11-18 PROCEDURE — 99386 PREV VISIT NEW AGE 40-64: CPT | Performed by: NURSE PRACTITIONER

## 2019-11-18 PROCEDURE — 81000 URINALYSIS NONAUTO W/SCOPE: CPT | Performed by: NURSE PRACTITIONER

## 2019-11-18 RX ORDER — DOXYCYCLINE HYCLATE 50 MG/1
324 CAPSULE, GELATIN COATED ORAL
COMMUNITY

## 2019-11-18 RX ORDER — SERTRALINE HYDROCHLORIDE 25 MG/1
25 TABLET, FILM COATED ORAL DAILY
COMMUNITY
End: 2020-05-21 | Stop reason: ALTCHOICE

## 2019-11-18 RX ORDER — HYDROXYCHLOROQUINE SULFATE 200 MG/1
200 TABLET, FILM COATED ORAL 2 TIMES DAILY
COMMUNITY

## 2019-11-18 ASSESSMENT — ENCOUNTER SYMPTOMS
DIARRHEA: 0
RESPIRATORY NEGATIVE: 1
GASTROINTESTINAL NEGATIVE: 1
EYES NEGATIVE: 1
CONSTIPATION: 0
ALLERGIC/IMMUNOLOGIC NEGATIVE: 1

## 2019-11-22 LAB
HPV COMMENT: NORMAL
HPV TYPE 16: NOT DETECTED
HPV TYPE 18: NOT DETECTED
HPVOH (OTHER TYPES): NOT DETECTED

## 2020-01-09 RX ORDER — MOMETASONE FUROATE 50 UG/1
2 SPRAY, METERED NASAL 2 TIMES DAILY
Qty: 17 G | Refills: 5 | Status: SHIPPED | OUTPATIENT
Start: 2020-01-09 | End: 2020-08-21 | Stop reason: SDUPTHER

## 2020-02-12 ENCOUNTER — TELEPHONE (OUTPATIENT)
Dept: OBGYN | Age: 50
End: 2020-02-12

## 2020-03-17 ENCOUNTER — HOSPITAL ENCOUNTER (OUTPATIENT)
Dept: WOMENS IMAGING | Age: 50
Discharge: HOME | End: 2020-03-17
Payer: COMMERCIAL

## 2020-03-17 PROCEDURE — 77063 BREAST TOMOSYNTHESIS BI: CPT

## 2020-04-21 ENCOUNTER — TELEPHONE (OUTPATIENT)
Dept: OTHER | Age: 50
End: 2020-04-21

## 2020-04-21 NOTE — TELEPHONE ENCOUNTER
Patient's genetic results sent to her and I attempted to call her and discuss these Negative results over the phone. I left her a detailed message to call me back if she would like to discuss these results any further.

## 2020-05-12 ENCOUNTER — TELEMEDICINE (OUTPATIENT)
Dept: OBGYN | Age: 50
End: 2020-05-12
Payer: COMMERCIAL

## 2020-05-12 PROCEDURE — 99214 OFFICE O/P EST MOD 30 MIN: CPT | Performed by: NURSE PRACTITIONER

## 2020-05-12 RX ORDER — MEGESTROL ACETATE 40 MG/1
40 TABLET ORAL DAILY
Qty: 10 TABLET | Refills: 0 | Status: SHIPPED | OUTPATIENT
Start: 2020-05-12 | End: 2020-05-29 | Stop reason: SDUPTHER

## 2020-05-12 RX ORDER — MEGESTROL ACETATE 40 MG/1
40 TABLET ORAL DAILY
Qty: 10 TABLET | Refills: 0 | Status: SHIPPED | OUTPATIENT
Start: 2020-05-12 | End: 2020-05-12

## 2020-05-12 ASSESSMENT — ENCOUNTER SYMPTOMS
EYES NEGATIVE: 1
RESPIRATORY NEGATIVE: 1
CONSTIPATION: 0
GASTROINTESTINAL NEGATIVE: 1
DIARRHEA: 0
ALLERGIC/IMMUNOLOGIC NEGATIVE: 1

## 2020-05-12 NOTE — PATIENT INSTRUCTIONS
bleeding.     · You feel dizzy or lightheaded, or you feel like you may faint.    Watch closely for changes in your health, and be sure to contact your doctor if:    · You think you may be pregnant.     · Your bleeding gets worse.     · You do not get better as expected. Where can you learn more? Go to [email or web address] and sign in to your [employer] account. Enter F477 in the [employer] box to learn more about \"Heavy Menstrual Periods: Care Instructions. \"     If you do not have an account, please click on the \"Sign Up Now\" link. Current as of: November 7, 2019Content Version: 12.4  © 5602-8236 Healthwise, Incorporated. Care instructions adapted under license by [facility] ([facility]). If you have questions about a medical condition or this instruction, always ask your healthcare professional. [address] any warranty or liability for your use of this information.

## 2020-05-12 NOTE — PROGRESS NOTES
perimenopausal.      Past Medical History:   Diagnosis Date    Hypothyroidism     RA (rheumatoid arthritis) (HCC)      Past Surgical History:   Procedure Laterality Date    BREAST BIOPSY      R breast    COLONOSCOPY      OTHER SURGICAL HISTORY      had procedure done similiar to tubal that prevents her from having kids     Family History   Problem Relation Age of Onset    Cancer Maternal Aunt         breast    Cancer Maternal Grandfather         colon    Cancer Paternal Grandmother         colon    Cancer Paternal Grandfather         colon    Cancer Other         paternal great aunt-breast    Breast Cancer Sister      Social History     Tobacco Use    Smoking status: Never Smoker    Smokeless tobacco: Never Used   Substance Use Topics    Alcohol use: Yes       Current Outpatient Medications   Medication Sig Dispense Refill    megestrol (MEGACE) 40 MG tablet Take 1 tablet by mouth daily 10 tablet 0    ferrous gluconate (FERGON) 324 (38 Fe) MG tablet Take 324 mg by mouth daily (with breakfast)      Nutritional Supplements (HOMOCYSTEINE SUPPORT PO) Take by mouth      VITAMIN D PO Take by mouth Indications: vitamin D 3 5000u      NONFORMULARY Indications: moducare       NONFORMULARY Indications: Thyroid Synergy       DHEA 10 MG CAPS Take by mouth      sertraline (ZOLOFT) 25 MG tablet Take 25 mg by mouth daily      hydroxychloroquine (PLAQUENIL) 200 MG tablet Take by mouth 2 times daily      MAGNESIUM CARBONATE PO Take by mouth      triamcinolone (KENALOG) 0.1 % cream Apply topically 2 times daily. (Patient not taking: Reported on 2019) 80 g 0    PROGESTERONE by Does not apply route.  Thyroid 260 MG TABS Take  by mouth.  celecoxib (CELEBREX) 200 MG capsule Take 200 mg by mouth 2 times daily. No current facility-administered medications for this visit.       Allergies   Allergen Reactions    Pcn [Penicillins] Rash    Other      surgiprep (used on her when she had breast biopsy)     There were no vitals filed for this visit. There is no height or weight on file to calculate BMI. Review of Systems   Constitutional: Negative. HENT: Negative. Eyes: Negative. Respiratory: Negative. Cardiovascular: Negative. Gastrointestinal: Negative. Negative for constipation and diarrhea. Endocrine: Negative. Genitourinary: Positive for menstrual problem and vaginal bleeding. Negative for frequency and urgency. Musculoskeletal: Negative. Skin: Negative. Allergic/Immunologic: Negative. Neurological: Negative. Hematological: Negative. Psychiatric/Behavioral: Negative. All other systems reviewed and are negative. Physical Exam     Diagnosis Orders   1. Menorrhagia with irregular cycle  US Non OB Transvaginal       MEDICATIONS:  Orders Placed This Encounter   Medications    DISCONTD: megestrol (MEGACE) 40 MG tablet     Sig: Take 1 tablet by mouth daily     Dispense:  10 tablet     Refill:  0    megestrol (MEGACE) 40 MG tablet     Sig: Take 1 tablet by mouth daily     Dispense:  10 tablet     Refill:  0       ORDERS:  Orders Placed This Encounter   Procedures    US Non OB Transvaginal       PLAN:  Ordered pelvic u/s  Starting Megace daily until bleeding stops  Mailing information about ablation or Myosure  F/u in office for possible repeat endo bx and form plan    Patient Instructions     We are committed to providing you with the best care possible. In order to help us achieve these goals please remember to bring all medications, herbal products, and over the counter supplements with you to each visit. If your provider has ordered testing for you, please be sure to follow up with our office if you have not received results within 7 days after testing took place. *If you receive a survey after visiting one of our offices, please take the time to share your experience concerning your physician office visit.  These surveys are confidential and no health information about you is shared. We are eager to improve for you and we are counting on your feedback to help make that happen. Patient Education        Heavy Menstrual Periods: Care Instructions  Your Care Instructions    Many women get heavy menstrual periods and painful cramps. For some women, this means passing large blood clots and changing sanitary pads or tampons often. You may also have periods that last longer than 7 days. A change in hormones or an irritation in the uterus can cause heavy bleeding. Women who are overweight are more likely to have heavy menstrual periods. But there may not be a specific cause for your heavy menstrual periods. Your doctor may recommend hormone treatments to slow or stop your periods. If a fibroid (a growth that is not cancer) is causing your heavy bleeding, your doctor may recommend surgery or other treatments to remove the growth. Because blood loss from heavy menstrual periods can make you very tired and weak (anemic), your doctor may recommend that you take extra iron. Follow-up care is a key part of your treatment and safety. Be sure to make and go to all appointments, and call your doctor if you are having problems. It's also a good idea to know your test results and keep a list of the medicines you take. How can you care for yourself at home? · Get plenty of rest.  · Keep a record of your periods. Write down when your period begins and ends and how much flow you have. That means counting the number of pads and tampons you use. Note whether they are soaked. Note any other symptoms. Take this record to your doctor appointments. · Take your medicines exactly as prescribed. Call your doctor if you think you are having a problem with your medicine. · Take pain medicines exactly as directed. ? If the doctor gave you a prescription medicine for pain, take it as prescribed.   ? If you are not taking a prescription pain medicine, ask your doctor if you can take an over-the-counter medicine. · Try to reach a healthy weight. If you are trying to lose weight, do it slowly with your doctor's advice. · If you are taking iron pills:  ? Try to take the pills about 1 hour before or 2 hours after meals. But you may need to take iron with some food to avoid an upset stomach. ? Vitamin C (from food or pills) helps your body absorb iron. Try taking iron pills with a glass of orange juice or other citrus fruit juice. ? Do not take antacids or drink milk or caffeine drinks (such as coffee, tea, or cola) at the same time or within 2 hours of the time that you take your iron. They can make it hard for your body to absorb the iron. ? Iron pills may cause stomach problems, such as heartburn, nausea, diarrhea, constipation, and cramps. Be sure to drink plenty of fluids, and include fruits, vegetables, and fiber in your diet each day. ? If you forget to take an iron pill, do not take a double dose of iron the next time you take a pill. ? Keep iron pills out of the reach of small children. An overdose of iron can be very dangerous. When should you call for help? Call 911 anytime you think you may need emergency care. For example, call if:    · You passed out (lost consciousness).    Call your doctor now or seek immediate medical care if:    · You have new or worse belly or pelvic pain.     · You have severe vaginal bleeding.     · You feel dizzy or lightheaded, or you feel like you may faint.    Watch closely for changes in your health, and be sure to contact your doctor if:    · You think you may be pregnant.     · Your bleeding gets worse.     · You do not get better as expected. Where can you learn more? Go to https://TextDiggerjaden.Edi.io. org and sign in to your eyeQ account. Enter F477 in the 911 View box to learn more about \"Heavy Menstrual Periods: Care Instructions. \"     If you do not have an account, please click on the \"Sign Up Now\"

## 2020-05-21 ENCOUNTER — OFFICE VISIT (OUTPATIENT)
Dept: OBGYN | Age: 50
End: 2020-05-21
Payer: COMMERCIAL

## 2020-05-21 ENCOUNTER — HOSPITAL ENCOUNTER (OUTPATIENT)
Dept: ULTRASOUND IMAGING | Age: 50
Discharge: HOME | End: 2020-05-21
Payer: COMMERCIAL

## 2020-05-21 VITALS
SYSTOLIC BLOOD PRESSURE: 110 MMHG | BODY MASS INDEX: 23.19 KG/M2 | HEART RATE: 71 BPM | DIASTOLIC BLOOD PRESSURE: 58 MMHG | HEIGHT: 70 IN | WEIGHT: 162 LBS

## 2020-05-21 PROCEDURE — 76830 TRANSVAGINAL US NON-OB: CPT

## 2020-05-21 PROCEDURE — 58100 BIOPSY OF UTERUS LINING: CPT | Performed by: NURSE PRACTITIONER

## 2020-05-21 PROCEDURE — 99213 OFFICE O/P EST LOW 20 MIN: CPT | Performed by: NURSE PRACTITIONER

## 2020-05-21 ASSESSMENT — ENCOUNTER SYMPTOMS
RESPIRATORY NEGATIVE: 1
DIARRHEA: 0
EYES NEGATIVE: 1
ALLERGIC/IMMUNOLOGIC NEGATIVE: 1
GASTROINTESTINAL NEGATIVE: 1
CONSTIPATION: 0

## 2020-05-21 NOTE — PATIENT INSTRUCTIONS
the uterus. · The doctor will use a tool called a speculum to see the cervix. · Then the doctor will pass a thin tube through the cervix to take a sample of the uterus lining. · The sample is sent to a lab. How long does the test take? The test will take about 5 to 15 minutes. What happens after the test?  · You will probably be able to go home right away. · You likely will have mild vaginal bleeding and may have cramps for a few days after the test. The cramps may feel like bad menstrual cramps. How can you care for yourself at home? · Ask your doctor if you can take an over-the-counter pain medicine, such as acetaminophen (Tylenol), ibuprofen (Advil, Motrin), or naproxen (Aleve). Be safe with medicines. Read and follow all instructions on the label. · Use pads or tampons for vaginal bleeding or discharge. · You may return to all your usual activities (including sex) when you feel like it. Follow-up care is a key part of your treatment and safety. Be sure to make and go to all appointments, and call your doctor if you are having problems. It's also a good idea to keep a list of the medicines you take. Ask your doctor when you can expect to have your test results. Where can you learn more? Go to https://[employer] PrivatepemaxwellOdersuneb.[employer]. org and sign in to your [employer] account. Enter [phone] in the [facility] box to learn more about \"Endometrial Biopsy: About This Test.\"     If you do not have an account, please click on the \"Sign Up Now\" link. Current as of: November 7, 2019Content Version: 12.4  © 0307-7481 Healthwise, Incorporated. Care instructions adapted under license by [facility] ([facility]). If you have questions about a medical condition or this instruction, always ask your healthcare professional. [address] any warranty or liability for your use of this information.

## 2020-05-29 ENCOUNTER — TELEPHONE (OUTPATIENT)
Dept: OBGYN | Age: 50
End: 2020-05-29

## 2020-05-29 RX ORDER — MEGESTROL ACETATE 40 MG/1
40 TABLET ORAL DAILY
Qty: 10 TABLET | Refills: 0 | Status: SHIPPED | OUTPATIENT
Start: 2020-05-29 | End: 2020-06-08

## 2020-06-02 DIAGNOSIS — N92.1 MENORRHAGIA WITH IRREGULAR CYCLE: ICD-10-CM

## 2020-06-02 LAB — FOLLICLE STIMULATING HORMONE: 2.9 MIU/ML

## 2020-06-05 ENCOUNTER — HOSPITAL ENCOUNTER (OUTPATIENT)
Dept: PREADMISSION TESTING | Age: 50
Discharge: HOME | End: 2020-06-09
Payer: COMMERCIAL

## 2020-06-05 VITALS — HEIGHT: 70 IN | BODY MASS INDEX: 23.19 KG/M2 | WEIGHT: 162 LBS

## 2020-06-05 LAB
BASOPHILS ABSOLUTE: 0 K/UL (ref 0–0.2)
BASOPHILS RELATIVE PERCENT: 0.6 % (ref 0–1)
EOSINOPHILS ABSOLUTE: 0 K/UL (ref 0–0.6)
EOSINOPHILS RELATIVE PERCENT: 0.4 % (ref 0–5)
HCT VFR BLD CALC: 36.7 % (ref 37–47)
HEMOGLOBIN: 11.8 G/DL (ref 12–16)
IMMATURE GRANULOCYTES #: 0 K/UL
LYMPHOCYTES ABSOLUTE: 1.5 K/UL (ref 1.1–4.5)
LYMPHOCYTES RELATIVE PERCENT: 28.8 % (ref 20–40)
MCH RBC QN AUTO: 31.5 PG (ref 27–31)
MCHC RBC AUTO-ENTMCNC: 32.2 G/DL (ref 33–37)
MCV RBC AUTO: 97.9 FL (ref 81–99)
MONOCYTES ABSOLUTE: 0.4 K/UL (ref 0–0.9)
MONOCYTES RELATIVE PERCENT: 7.1 % (ref 0–10)
NEUTROPHILS ABSOLUTE: 3.2 K/UL (ref 1.5–7.5)
NEUTROPHILS RELATIVE PERCENT: 62.7 % (ref 50–65)
PDW BLD-RTO: 12.8 % (ref 11.5–14.5)
PLATELET # BLD: 246 K/UL (ref 130–400)
PMV BLD AUTO: 11 FL (ref 9.4–12.3)
RBC # BLD: 3.75 M/UL (ref 4.2–5.4)
WBC # BLD: 5.1 K/UL (ref 4.8–10.8)

## 2020-06-05 PROCEDURE — 85025 COMPLETE CBC W/AUTO DIFF WBC: CPT

## 2020-06-05 PROCEDURE — 93005 ELECTROCARDIOGRAM TRACING: CPT | Performed by: OBSTETRICS & GYNECOLOGY

## 2020-06-05 RX ORDER — LEVOTHYROXINE AND LIOTHYRONINE 38; 9 UG/1; UG/1
90 TABLET ORAL DAILY
COMMUNITY

## 2020-06-05 SDOH — HEALTH STABILITY: MENTAL HEALTH: HOW OFTEN DO YOU HAVE A DRINK CONTAINING ALCOHOL?: NEVER

## 2020-06-06 LAB
EKG P AXIS: 49 DEGREES
EKG P-R INTERVAL: 140 MS
EKG Q-T INTERVAL: 410 MS
EKG QRS DURATION: 84 MS
EKG QTC CALCULATION (BAZETT): 422 MS
EKG T AXIS: 48 DEGREES

## 2020-06-06 PROCEDURE — 93010 ELECTROCARDIOGRAM REPORT: CPT | Performed by: INTERNAL MEDICINE

## 2020-06-08 RX ORDER — MEGESTROL ACETATE 40 MG/1
40 TABLET ORAL DAILY
Qty: 10 TABLET | Refills: 0 | Status: SHIPPED | OUTPATIENT
Start: 2020-06-08 | End: 2020-07-06 | Stop reason: ALTCHOICE

## 2020-06-10 ENCOUNTER — TELEMEDICINE (OUTPATIENT)
Dept: OBGYN | Age: 50
End: 2020-06-10
Payer: COMMERCIAL

## 2020-06-10 PROCEDURE — 99213 OFFICE O/P EST LOW 20 MIN: CPT | Performed by: OBSTETRICS & GYNECOLOGY

## 2020-06-10 NOTE — PROGRESS NOTES
,   Past Medical History:   Diagnosis Date    Heavy menses     \"constant\"    Hypothyroidism     PONV (postoperative nausea and vomiting)     RA (rheumatoid arthritis) (HCC)    ,   Past Surgical History:   Procedure Laterality Date    BREAST BIOPSY      R breast    COLONOSCOPY      DILATION AND CURETTAGE OF UTERUS N/A 6/19/2020    HYSTEROSCOPY, ENDOMETRIAL ABLATION WITH Virgie Sit, DILATION AND CURETTAGE performed by Maame Gleason MD at [address] [place] [address] [place] Said      had procedure done similiar to tubal that prevents her from having kids   ,   Social History     Tobacco Use    Smoking status: Never Smoker    Smokeless tobacco: Never Used   Substance Use Topics    Alcohol use: Never     Frequency: Never    Drug use: No   ,   Family History   Problem Relation Age of Onset    Cancer Maternal Aunt         breast    Cancer Maternal Grandfather         colon    Cancer Paternal Grandmother         colon    Cancer Paternal Grandfather         colon    Cancer Other         paternal great aunt-breast    Breast Cancer Sister    ,   There is no immunization history on file for this patient.,   Health Maintenance   Topic Date Due    HIV screen  03/09/1983    DTaP/Tdap/Td vaccine (1 - Tdap) 03/09/1987    Lipid screen  03/09/2008    Shingles Vaccine (1 of 2) 03/09/2018    Colon cancer screen colonoscopy  03/09/2018    Flu vaccine (Season Ended) 09/01/2020    Breast cancer screen  03/17/2021    Cervical cancer screen  11/18/2024    Hepatitis A vaccine  Aged Out    Hepatitis B vaccine  Aged Out    Hib vaccine  Aged Out    Meningococcal (ACWY) vaccine  Aged Out    Pneumococcal 0-64 years Vaccine  Aged Out       PHYSICAL EXAMINATION:  [ INSTRUCTIONS:  \"[x]\" Indicates a positive item  \"[]\" Indicates a negative item  -- DELETE ALL ITEMS NOT EXAMINED]  Vital Signs: (As obtained by patient/caregiver or practitioner observation)    Blood pressure-  Heart rate-    Respiratory rate- Temperature-  Pulse oximetry-     Constitutional: [x] Appears well-developed and well-nourished [x] No apparent distress      [] Abnormal-   Mental status  [x] Alert and awake  [x] Oriented to person/place/time [x]Able to follow commands      Eyes:  EOM    [x]  Normal  [] Abnormal-  Sclera  [x]  Normal  [] Abnormal -         Discharge [x]  None visible  [] Abnormal -    HENT:   [x] Normocephalic, atraumatic. [] Abnormal   [x] Mouth/Throat: Mucous membranes are moist.     External Ears [x] Normal  [] Abnormal-     Neck: [x] No visualized mass     Pulmonary/Chest: [x] Respiratory effort normal.  [x] No visualized signs of difficulty breathing or respiratory distress        [] Abnormal-      Musculoskeletal:   [x] Normal gait with no signs of ataxia         [x] Normal range of motion of neck        [] Abnormal-       Neurological:        [x] No Facial Asymmetry (Cranial nerve 7 motor function) (limited exam to video visit)          [x] No gaze palsy        [] Abnormal-         Skin:        [x] No significant exanthematous lesions or discoloration noted on facial skin         [] Abnormal-            Psychiatric:       [x] Normal Affect [x] No Hallucinations        [] Abnormal-     Other pertinent observable physical exam findings-   History: Menorrhagia   Pelvic ultrasound: Transvaginal imaging the pelvis is performed. COMPARISON: 8/8/2016   FINDINGS: The uterus is normal in position measuring 9.2 x 5.7 x 7.9   cm. The endometrium is heterogeneous and thickened measuring 2.2 cm. Few scattered cystic foci of the endometrium noted. This represents a   change compared to the 2016 exam. There are cervical nabothian cysts. The right ovary measures 3.1 x 0.9 x 2.2 cm. The left ovary measures   2.9 x 2.5 x 1.5 cm. There is vascular flow seen within each ovary. No free fluid seen in the pelvis.       Impression   1. Thickened endometrium measuring up to 2.2 cm with few scattered   internal cystic foci.  No myometrial homes.    --Robert Tompkins MD on 6/10/2020 at 2:04 PM    An electronic signature was used to authenticate this note.

## 2020-06-16 ENCOUNTER — OFFICE VISIT (OUTPATIENT)
Age: 50
End: 2020-06-16

## 2020-06-16 VITALS — HEART RATE: 81 BPM | OXYGEN SATURATION: 99 % | TEMPERATURE: 98 F

## 2020-06-16 RX ORDER — MISOPROSTOL 100 UG/1
100 TABLET ORAL ONCE
Qty: 1 TABLET | Refills: 0 | Status: ON HOLD | OUTPATIENT
Start: 2020-06-16 | End: 2020-06-19 | Stop reason: HOSPADM

## 2020-06-17 LAB
REPORT: NORMAL
SARS-COV-2: NOT DETECTED
THIS TEST SENT TO: NORMAL

## 2020-06-18 ENCOUNTER — TELEPHONE (OUTPATIENT)
Dept: OBGYN | Age: 50
End: 2020-06-18

## 2020-06-18 ENCOUNTER — TELEPHONE (OUTPATIENT)
Age: 50
End: 2020-06-18

## 2020-06-19 ENCOUNTER — ANESTHESIA (OUTPATIENT)
Dept: OPERATING ROOM | Age: 50
End: 2020-06-19
Payer: COMMERCIAL

## 2020-06-19 ENCOUNTER — HOSPITAL ENCOUNTER (OUTPATIENT)
Age: 50
Setting detail: OUTPATIENT SURGERY
Discharge: HOME | End: 2020-06-19
Attending: OBSTETRICS & GYNECOLOGY | Admitting: OBSTETRICS & GYNECOLOGY
Payer: COMMERCIAL

## 2020-06-19 ENCOUNTER — ANESTHESIA EVENT (OUTPATIENT)
Dept: OPERATING ROOM | Age: 50
End: 2020-06-19
Payer: COMMERCIAL

## 2020-06-19 VITALS
BODY MASS INDEX: 22.9 KG/M2 | HEART RATE: 68 BPM | HEIGHT: 70 IN | DIASTOLIC BLOOD PRESSURE: 40 MMHG | RESPIRATION RATE: 16 BRPM | WEIGHT: 160 LBS | SYSTOLIC BLOOD PRESSURE: 91 MMHG | TEMPERATURE: 97 F | OXYGEN SATURATION: 100 %

## 2020-06-19 VITALS
DIASTOLIC BLOOD PRESSURE: 54 MMHG | OXYGEN SATURATION: 99 % | SYSTOLIC BLOOD PRESSURE: 90 MMHG | RESPIRATION RATE: 15 BRPM | TEMPERATURE: 98 F

## 2020-06-19 LAB — HCG QUALITATIVE: NEGATIVE

## 2020-06-19 PROCEDURE — 6360000002 HC RX W HCPCS: Performed by: NURSE ANESTHETIST, CERTIFIED REGISTERED

## 2020-06-19 PROCEDURE — 3600000014 HC SURGERY LEVEL 4 ADDTL 15MIN: Performed by: OBSTETRICS & GYNECOLOGY

## 2020-06-19 PROCEDURE — C1886 CATHETER, ABLATION: HCPCS | Performed by: OBSTETRICS & GYNECOLOGY

## 2020-06-19 PROCEDURE — 6360000002 HC RX W HCPCS: Performed by: ANESTHESIOLOGY

## 2020-06-19 PROCEDURE — 36415 COLL VENOUS BLD VENIPUNCTURE: CPT

## 2020-06-19 PROCEDURE — 2709999900 HC NON-CHARGEABLE SUPPLY: Performed by: OBSTETRICS & GYNECOLOGY

## 2020-06-19 PROCEDURE — 2720000010 HC SURG SUPPLY STERILE: Performed by: OBSTETRICS & GYNECOLOGY

## 2020-06-19 PROCEDURE — 2580000003 HC RX 258: Performed by: ANESTHESIOLOGY

## 2020-06-19 PROCEDURE — 6370000000 HC RX 637 (ALT 250 FOR IP): Performed by: ANESTHESIOLOGY

## 2020-06-19 PROCEDURE — 84703 CHORIONIC GONADOTROPIN ASSAY: CPT

## 2020-06-19 PROCEDURE — 3700000000 HC ANESTHESIA ATTENDED CARE: Performed by: OBSTETRICS & GYNECOLOGY

## 2020-06-19 PROCEDURE — 3700000001 HC ADD 15 MINUTES (ANESTHESIA): Performed by: OBSTETRICS & GYNECOLOGY

## 2020-06-19 PROCEDURE — 3600000004 HC SURGERY LEVEL 4 BASE: Performed by: OBSTETRICS & GYNECOLOGY

## 2020-06-19 PROCEDURE — 7100000000 HC PACU RECOVERY - FIRST 15 MIN: Performed by: OBSTETRICS & GYNECOLOGY

## 2020-06-19 PROCEDURE — 7100000001 HC PACU RECOVERY - ADDTL 15 MIN: Performed by: OBSTETRICS & GYNECOLOGY

## 2020-06-19 PROCEDURE — 7100000010 HC PHASE II RECOVERY - FIRST 15 MIN: Performed by: OBSTETRICS & GYNECOLOGY

## 2020-06-19 PROCEDURE — 58563 HYSTEROSCOPY ABLATION: CPT | Performed by: OBSTETRICS & GYNECOLOGY

## 2020-06-19 PROCEDURE — 7100000011 HC PHASE II RECOVERY - ADDTL 15 MIN: Performed by: OBSTETRICS & GYNECOLOGY

## 2020-06-19 PROCEDURE — 88305 TISSUE EXAM BY PATHOLOGIST: CPT

## 2020-06-19 RX ORDER — HYDRALAZINE HYDROCHLORIDE 20 MG/ML
5 INJECTION INTRAMUSCULAR; INTRAVENOUS EVERY 10 MIN PRN
Status: DISCONTINUED | OUTPATIENT
Start: 2020-06-19 | End: 2020-06-19 | Stop reason: HOSPADM

## 2020-06-19 RX ORDER — MIDAZOLAM HYDROCHLORIDE 1 MG/ML
2 INJECTION INTRAMUSCULAR; INTRAVENOUS
Status: DISCONTINUED | OUTPATIENT
Start: 2020-06-19 | End: 2020-06-19 | Stop reason: HOSPADM

## 2020-06-19 RX ORDER — FENTANYL CITRATE 50 UG/ML
50 INJECTION, SOLUTION INTRAMUSCULAR; INTRAVENOUS
Status: DISCONTINUED | OUTPATIENT
Start: 2020-06-19 | End: 2020-06-19 | Stop reason: HOSPADM

## 2020-06-19 RX ORDER — PROMETHAZINE HYDROCHLORIDE 25 MG/ML
6.25 INJECTION, SOLUTION INTRAMUSCULAR; INTRAVENOUS
Status: DISCONTINUED | OUTPATIENT
Start: 2020-06-19 | End: 2020-06-19 | Stop reason: HOSPADM

## 2020-06-19 RX ORDER — SODIUM CHLORIDE, SODIUM LACTATE, POTASSIUM CHLORIDE, CALCIUM CHLORIDE 600; 310; 30; 20 MG/100ML; MG/100ML; MG/100ML; MG/100ML
INJECTION, SOLUTION INTRAVENOUS CONTINUOUS
Status: DISCONTINUED | OUTPATIENT
Start: 2020-06-19 | End: 2020-06-19 | Stop reason: HOSPADM

## 2020-06-19 RX ORDER — HYDROMORPHONE HYDROCHLORIDE 1 MG/ML
0.5 INJECTION, SOLUTION INTRAMUSCULAR; INTRAVENOUS; SUBCUTANEOUS EVERY 5 MIN PRN
Status: DISCONTINUED | OUTPATIENT
Start: 2020-06-19 | End: 2020-06-19 | Stop reason: HOSPADM

## 2020-06-19 RX ORDER — MORPHINE SULFATE 4 MG/ML
4 INJECTION, SOLUTION INTRAMUSCULAR; INTRAVENOUS EVERY 5 MIN PRN
Status: DISCONTINUED | OUTPATIENT
Start: 2020-06-19 | End: 2020-06-19 | Stop reason: HOSPADM

## 2020-06-19 RX ORDER — SODIUM CHLORIDE 0.9 % (FLUSH) 0.9 %
10 SYRINGE (ML) INJECTION PRN
Status: DISCONTINUED | OUTPATIENT
Start: 2020-06-19 | End: 2020-06-19 | Stop reason: HOSPADM

## 2020-06-19 RX ORDER — FENTANYL CITRATE 50 UG/ML
INJECTION, SOLUTION INTRAMUSCULAR; INTRAVENOUS PRN
Status: DISCONTINUED | OUTPATIENT
Start: 2020-06-19 | End: 2020-06-19 | Stop reason: SDUPTHER

## 2020-06-19 RX ORDER — HYDROMORPHONE HYDROCHLORIDE 1 MG/ML
0.25 INJECTION, SOLUTION INTRAMUSCULAR; INTRAVENOUS; SUBCUTANEOUS EVERY 5 MIN PRN
Status: DISCONTINUED | OUTPATIENT
Start: 2020-06-19 | End: 2020-06-19 | Stop reason: HOSPADM

## 2020-06-19 RX ORDER — METOCLOPRAMIDE HYDROCHLORIDE 5 MG/ML
10 INJECTION INTRAMUSCULAR; INTRAVENOUS
Status: DISCONTINUED | OUTPATIENT
Start: 2020-06-19 | End: 2020-06-19 | Stop reason: HOSPADM

## 2020-06-19 RX ORDER — HYDROXYZINE HYDROCHLORIDE 25 MG/ML
25 INJECTION, SOLUTION INTRAMUSCULAR
Status: DISCONTINUED | OUTPATIENT
Start: 2020-06-19 | End: 2020-06-19 | Stop reason: HOSPADM

## 2020-06-19 RX ORDER — HYDROCODONE BITARTRATE AND ACETAMINOPHEN 5; 325 MG/1; MG/1
1 TABLET ORAL EVERY 6 HOURS PRN
Qty: 12 TABLET | Refills: 0 | Status: SHIPPED | OUTPATIENT
Start: 2020-06-19 | End: 2020-06-22

## 2020-06-19 RX ORDER — IBUPROFEN 800 MG/1
800 TABLET ORAL EVERY 8 HOURS PRN
Qty: 60 TABLET | Refills: 0 | Status: SHIPPED | OUTPATIENT
Start: 2020-06-19 | End: 2020-10-09 | Stop reason: ALTCHOICE

## 2020-06-19 RX ORDER — MORPHINE SULFATE 4 MG/ML
2 INJECTION, SOLUTION INTRAMUSCULAR; INTRAVENOUS EVERY 5 MIN PRN
Status: DISCONTINUED | OUTPATIENT
Start: 2020-06-19 | End: 2020-06-19 | Stop reason: HOSPADM

## 2020-06-19 RX ORDER — METOCLOPRAMIDE 10 MG/1
10 TABLET ORAL ONCE
Status: COMPLETED | OUTPATIENT
Start: 2020-06-19 | End: 2020-06-19

## 2020-06-19 RX ORDER — MORPHINE SULFATE 4 MG/ML
4 INJECTION, SOLUTION INTRAMUSCULAR; INTRAVENOUS
Status: DISCONTINUED | OUTPATIENT
Start: 2020-06-19 | End: 2020-06-19 | Stop reason: HOSPADM

## 2020-06-19 RX ORDER — ONDANSETRON 2 MG/ML
INJECTION INTRAMUSCULAR; INTRAVENOUS PRN
Status: DISCONTINUED | OUTPATIENT
Start: 2020-06-19 | End: 2020-06-19 | Stop reason: SDUPTHER

## 2020-06-19 RX ORDER — DIPHENHYDRAMINE HYDROCHLORIDE 50 MG/ML
12.5 INJECTION INTRAMUSCULAR; INTRAVENOUS
Status: DISCONTINUED | OUTPATIENT
Start: 2020-06-19 | End: 2020-06-19 | Stop reason: HOSPADM

## 2020-06-19 RX ORDER — LIDOCAINE HYDROCHLORIDE 10 MG/ML
1 INJECTION, SOLUTION EPIDURAL; INFILTRATION; INTRACAUDAL; PERINEURAL
Status: DISCONTINUED | OUTPATIENT
Start: 2020-06-19 | End: 2020-06-19 | Stop reason: HOSPADM

## 2020-06-19 RX ORDER — APREPITANT 40 MG/1
40 CAPSULE ORAL ONCE
Status: COMPLETED | OUTPATIENT
Start: 2020-06-19 | End: 2020-06-19

## 2020-06-19 RX ORDER — SCOLOPAMINE TRANSDERMAL SYSTEM 1 MG/1
1 PATCH, EXTENDED RELEASE TRANSDERMAL ONCE
Status: DISCONTINUED | OUTPATIENT
Start: 2020-06-19 | End: 2020-06-19 | Stop reason: HOSPADM

## 2020-06-19 RX ORDER — SODIUM CHLORIDE 0.9 % (FLUSH) 0.9 %
10 SYRINGE (ML) INJECTION EVERY 12 HOURS SCHEDULED
Status: DISCONTINUED | OUTPATIENT
Start: 2020-06-19 | End: 2020-06-19 | Stop reason: HOSPADM

## 2020-06-19 RX ORDER — DEXAMETHASONE SODIUM PHOSPHATE 10 MG/ML
INJECTION, SOLUTION INTRAMUSCULAR; INTRAVENOUS PRN
Status: DISCONTINUED | OUTPATIENT
Start: 2020-06-19 | End: 2020-06-19 | Stop reason: SDUPTHER

## 2020-06-19 RX ORDER — FAMOTIDINE 20 MG/1
20 TABLET, FILM COATED ORAL
Status: COMPLETED | OUTPATIENT
Start: 2020-06-19 | End: 2020-06-19

## 2020-06-19 RX ORDER — LABETALOL HYDROCHLORIDE 5 MG/ML
5 INJECTION, SOLUTION INTRAVENOUS EVERY 10 MIN PRN
Status: DISCONTINUED | OUTPATIENT
Start: 2020-06-19 | End: 2020-06-19 | Stop reason: HOSPADM

## 2020-06-19 RX ORDER — PROPOFOL 10 MG/ML
INJECTION, EMULSION INTRAVENOUS PRN
Status: DISCONTINUED | OUTPATIENT
Start: 2020-06-19 | End: 2020-06-19 | Stop reason: SDUPTHER

## 2020-06-19 RX ORDER — MIDAZOLAM HYDROCHLORIDE 1 MG/ML
INJECTION INTRAMUSCULAR; INTRAVENOUS PRN
Status: DISCONTINUED | OUTPATIENT
Start: 2020-06-19 | End: 2020-06-19 | Stop reason: SDUPTHER

## 2020-06-19 RX ORDER — MEPERIDINE HYDROCHLORIDE 50 MG/ML
12.5 INJECTION INTRAMUSCULAR; INTRAVENOUS; SUBCUTANEOUS EVERY 5 MIN PRN
Status: DISCONTINUED | OUTPATIENT
Start: 2020-06-19 | End: 2020-06-19 | Stop reason: HOSPADM

## 2020-06-19 RX ADMIN — SODIUM CHLORIDE, POTASSIUM CHLORIDE, SODIUM LACTATE AND CALCIUM CHLORIDE: 600; 310; 30; 20 INJECTION, SOLUTION INTRAVENOUS at 10:28

## 2020-06-19 RX ADMIN — HYDROMORPHONE HYDROCHLORIDE 1 MG: 1 INJECTION, SOLUTION INTRAMUSCULAR; INTRAVENOUS; SUBCUTANEOUS at 10:59

## 2020-06-19 RX ADMIN — MIDAZOLAM 2 MG: 1 INJECTION INTRAMUSCULAR; INTRAVENOUS at 10:38

## 2020-06-19 RX ADMIN — METOCLOPRAMIDE 10 MG: 10 TABLET ORAL at 10:26

## 2020-06-19 RX ADMIN — DEXAMETHASONE SODIUM PHOSPHATE 10 MG: 10 INJECTION, SOLUTION INTRAMUSCULAR; INTRAVENOUS at 10:47

## 2020-06-19 RX ADMIN — APREPITANT 40 MG: 40 CAPSULE ORAL at 10:26

## 2020-06-19 RX ADMIN — FENTANYL CITRATE 100 MCG: 50 INJECTION INTRAMUSCULAR; INTRAVENOUS at 10:44

## 2020-06-19 RX ADMIN — PROPOFOL 200 MG: 10 INJECTION, EMULSION INTRAVENOUS at 10:44

## 2020-06-19 RX ADMIN — ONDANSETRON HYDROCHLORIDE 4 MG: 2 INJECTION, SOLUTION INTRAMUSCULAR; INTRAVENOUS at 10:47

## 2020-06-19 RX ADMIN — FAMOTIDINE 20 MG: 20 TABLET ORAL at 10:26

## 2020-06-19 ASSESSMENT — PAIN DESCRIPTION - DESCRIPTORS
DESCRIPTORS: ACHING
DESCRIPTORS: ACHING

## 2020-06-19 ASSESSMENT — PAIN DESCRIPTION - FREQUENCY
FREQUENCY: INTERMITTENT
FREQUENCY: INTERMITTENT

## 2020-06-19 ASSESSMENT — PAIN DESCRIPTION - PAIN TYPE
TYPE: CHRONIC PAIN
TYPE: CHRONIC PAIN

## 2020-06-19 ASSESSMENT — ENCOUNTER SYMPTOMS: SHORTNESS OF BREATH: 0

## 2020-06-19 ASSESSMENT — PAIN SCALES - GENERAL
PAINLEVEL_OUTOF10: 2
PAINLEVEL_OUTOF10: 2

## 2020-06-19 ASSESSMENT — PAIN DESCRIPTION - LOCATION
LOCATION: ABDOMEN
LOCATION: ABDOMEN

## 2020-06-19 ASSESSMENT — LIFESTYLE VARIABLES: SMOKING_STATUS: 0

## 2020-06-19 ASSESSMENT — PAIN DESCRIPTION - ONSET
ONSET: AWAKENED FROM SLEEP
ONSET: AWAKENED FROM SLEEP

## 2020-06-19 ASSESSMENT — PAIN - FUNCTIONAL ASSESSMENT: PAIN_FUNCTIONAL_ASSESSMENT: 0-10

## 2020-06-19 NOTE — ANESTHESIA PRE PROCEDURE
Department of Anesthesiology  Preprocedure Note       Name:  Malcolm Estrada   Age:  44 y.o.  :  1970                                          MRN:  [id number]         Date:  2020      Surgeon: Bonnie Greenwood):  Cindi Rutherford MD    Procedure: Procedure(s):  EXAMINATION UNDER ANESTHESIA  HYSTEROSCOPY, ENDOMETRIAL ABLATION WITH NOVASURE, DILATION AND CURETTAGE    Medications prior to admission:   Prior to Admission medications    Medication Sig Start Date End Date Taking? Authorizing Provider   megestrol (MEGACE) 40 MG tablet TAKE 1 TABLET BY MOUTH DAILY 20  Yes RAVEN Gamboa - CNP   thyroid (ARMOUR) 60 MG tablet Take 60 mg by mouth daily Indications: Underactive Thyroid   Yes Historical Provider, MD   ferrous gluconate (FERGON) 324 (38 Fe) MG tablet Take 324 mg by mouth daily (with breakfast)   Yes Historical Provider, MD   VITAMIN D PO Take 5,000 Units by mouth daily Indications: vitamin D 3 5000u    Yes Historical Provider, MD   DHEA 10 MG CAPS Take 1 capsule by mouth daily    Yes Historical Provider, MD   hydroxychloroquine (PLAQUENIL) 200 MG tablet Take by mouth 2 times daily   Yes Historical Provider, MD   MAGNESIUM CARBONATE PO Take 1 capsule by mouth daily    Yes Historical Provider, MD   misoprostol (CYTOTEC) 100 MCG tablet Place 1 tablet vaginally once for 1 dose Night before procedure 20  Cindi Rutherford MD       Current medications:    No current facility-administered medications for this encounter. Allergies: Allergies   Allergen Reactions    Pcn [Penicillins] Rash    Other      surgiprep (used on her when she had breast biopsy)    Hibiclens [Chlorhexidine Gluconate] Rash     Possible; rash with surgical wash during breast surgery.         Problem List:    Patient Active Problem List   Diagnosis Code    Family history of breast cancer Z80.3    Family history of colon cancer Z80.0    Breast mass, right N63.10    S/P breast biopsy - right  Z98.890

## 2020-06-26 ASSESSMENT — ENCOUNTER SYMPTOMS
ALLERGIC/IMMUNOLOGIC NEGATIVE: 1
GASTROINTESTINAL NEGATIVE: 1
RESPIRATORY NEGATIVE: 1
EYES NEGATIVE: 1

## 2020-07-06 ENCOUNTER — TELEMEDICINE (OUTPATIENT)
Dept: OBGYN | Age: 50
End: 2020-07-06
Payer: COMMERCIAL

## 2020-07-06 PROCEDURE — 99213 OFFICE O/P EST LOW 20 MIN: CPT | Performed by: OBSTETRICS & GYNECOLOGY

## 2020-07-06 ASSESSMENT — ENCOUNTER SYMPTOMS
EYES NEGATIVE: 1
RESPIRATORY NEGATIVE: 1
ALLERGIC/IMMUNOLOGIC NEGATIVE: 1
GASTROINTESTINAL NEGATIVE: 1

## 2020-07-06 NOTE — PROGRESS NOTES
2020    TELEHEALTH EVALUATION -- Audio/Visual (During EWDVI-73 public health emergency)    HPI:    Anand Hodges (:  1970) has requested an audio/video evaluation for the following concern(s):    Patient with h/o Menometrorrhagia s/p Novasure ablation. She is doing well. Still with scant spotting. Can wear a liner for the entire day. No pain or foul discharge. Review of Systems   Constitutional: Negative. HENT: Negative. Eyes: Negative. Respiratory: Negative. Cardiovascular: Negative. Gastrointestinal: Negative. Endocrine: Negative. Genitourinary: Negative. Musculoskeletal: Negative. Skin: Negative. Allergic/Immunologic: Negative. Neurological: Negative. Hematological: Negative. Psychiatric/Behavioral: Negative. Past Medical History:   Diagnosis Date    Heavy menses     \"constant\"    Hypothyroidism     PONV (postoperative nausea and vomiting)     RA (rheumatoid arthritis) (HCC)        Past Surgical History:   Procedure Laterality Date    BREAST BIOPSY      R breast    COLONOSCOPY      DILATION AND CURETTAGE OF UTERUS N/A 2020    HYSTEROSCOPY, ENDOMETRIAL ABLATION WITH Js Lasso, DILATION AND CURETTAGE performed by Beatriz Tovar MD at [address] [place] [address]      had procedure done similiar to tubal that prevents her from having kids       Family History   Problem Relation Age of Onset    Cancer Maternal Aunt         breast    Cancer Maternal Grandfather         colon    Cancer Paternal Grandmother         colon    Cancer Paternal Grandfather         colon    Cancer Other         paternal great aunt-breast    Breast Cancer Sister        Social History     Tobacco Use    Smoking status: Never Smoker    Smokeless tobacco: Never Used   Substance Use Topics    Alcohol use: Never     Frequency: Never    Drug use: No       Prior to Visit Medications    Medication Sig Taking?  Authorizing Provider   ibuprofen (ADVIL;MOTRIN) 800 MG tablet Take 1 tablet by mouth every 8 hours as needed for Pain  Yisel Fall MD   thyroid (ARMOUR) 60 MG tablet Take 60 mg by mouth daily Indications: Underactive Thyroid  Historical Provider, MD   ferrous gluconate (FERGON) 324 (38 Fe) MG tablet Take 324 mg by mouth daily (with breakfast)  Historical Provider, MD   VITAMIN D PO Take 5,000 Units by mouth daily Indications: vitamin D 3 5000u   Historical Provider, MD   DHEA 10 MG CAPS Take 1 capsule by mouth daily   Historical Provider, MD   hydroxychloroquine (PLAQUENIL) 200 MG tablet Take by mouth 2 times daily  Historical Provider, MD   MAGNESIUM CARBONATE PO Take 1 capsule by mouth daily   Historical Provider, MD       Allergies   Allergen Reactions    Pcn [Penicillins] Rash    Other      surgiprep (used on her when she had breast biopsy)    Hibiclens [Chlorhexidine Gluconate] Rash     Possible; rash with surgical wash during breast surgery. FINAL DIAGNOSIS:    Uterus, endometrial curettage: Mixed pattern endometrium (disordered  proliferative and mid-secretory phase), negative for evidence of  hyperplasia or carcinoma.   CBG/CBG      PHYSICAL EXAMINATION  Physical Exam  Constitutional:       General: She is not in acute distress. Appearance: Normal appearance. She is not ill-appearing, toxic-appearing or diaphoretic. HENT:      Head: Normocephalic and atraumatic. Eyes:      Extraocular Movements: Extraocular movements intact. Pulmonary:      Effort: Pulmonary effort is normal. No respiratory distress. Musculoskeletal: Normal range of motion. General: No tenderness. Skin:     General: Skin is warm and dry. Findings: No rash. Neurological:      Mental Status: She is alert and oriented to person, place, and time.    Psychiatric:         Attention and Perception: Attention and perception normal.         Mood and Affect: Mood and affect normal.         Speech: Speech normal.         Behavior: Behavior normal. Behavior is cooperative. Thought Content: Thought content normal.         Cognition and Memory: Cognition and memory normal.         Judgment: Judgment normal.           ASSESSMENT   Diagnosis Orders   1. Menorrhagia with irregular cycle     2. Postoperative follow-up         PLAN  1. Surgical findings, photos and pathology reviewed with patient  2. All questions answered  3. Return jose Howard is a 44 y.o. female being evaluated by a Virtual Visit (video visit) encounter to address concerns as mentioned above. A caregiver was present when appropriate. Due to this being a TeleHealth encounter (During URGAL-56 public [facility] emergency), evaluation of the following organ systems was limited: Vitals/Constitutional/EENT/Resp/CV/GI//MS/Neuro/Skin/Heme-Lymph-Imm. Pursuant to the emergency declaration under the [address] [place] [address] authority and the [employer] Act, this Virtual Visit was conducted with patient's (and/or legal guardian's) consent, to reduce the patient's risk of exposure to COVID-19 and provide necessary medical care. The patient (and/or legal guardian) has also been advised to contact this office for worsening conditions or problems, and seek emergency medical treatment and/or call 911 if deemed necessary. Patient identification was verified at the start of the visit: Yes    Services were provided through a video synchronous discussion virtually to substitute for in-person clinic visit. Patient and provider were located at their individual homes. --Pablo Muhammad MD on 7/6/2020 at 12:02 PM    An electronic signature was used to authenticate this note.

## 2020-07-31 DIAGNOSIS — H69.83 DYSFUNCTION OF BOTH EUSTACHIAN TUBES: ICD-10-CM

## 2020-07-31 RX ORDER — AZELASTINE 1 MG/ML
SPRAY, METERED NASAL
Qty: 30 ML | Refills: 0 | Status: SHIPPED | OUTPATIENT
Start: 2020-07-31 | End: 2020-08-21 | Stop reason: SDUPTHER

## 2020-08-17 ENCOUNTER — OFFICE VISIT (OUTPATIENT)
Dept: OBGYN | Age: 50
End: 2020-08-17
Payer: COMMERCIAL

## 2020-08-17 VITALS
DIASTOLIC BLOOD PRESSURE: 62 MMHG | BODY MASS INDEX: 23.34 KG/M2 | SYSTOLIC BLOOD PRESSURE: 120 MMHG | HEIGHT: 70 IN | WEIGHT: 163 LBS

## 2020-08-17 PROCEDURE — 99214 OFFICE O/P EST MOD 30 MIN: CPT | Performed by: OBSTETRICS & GYNECOLOGY

## 2020-08-17 RX ORDER — DOXYCYCLINE HYCLATE 100 MG
100 TABLET ORAL 2 TIMES DAILY
Qty: 28 TABLET | Refills: 0 | Status: SHIPPED | OUTPATIENT
Start: 2020-08-17 | End: 2020-08-31

## 2020-08-17 ASSESSMENT — ENCOUNTER SYMPTOMS
RESPIRATORY NEGATIVE: 1
ALLERGIC/IMMUNOLOGIC NEGATIVE: 1
GASTROINTESTINAL NEGATIVE: 1
EYES NEGATIVE: 1

## 2020-08-17 NOTE — PROGRESS NOTES
Noemi Muñoz is a 44 y.o.  who presents today for c/o irregular bleeding. Pt had an endometrial ablation on 6-19-20. Pt having vaginal  Bleeding since that has increased in flow. Patient states now like a heavy period similar to before procedure. Changes liners three times per day. No c/o pain. Denies discharge. Review of Systems   Constitutional: Negative. HENT: Negative. Eyes: Negative. Respiratory: Negative. Cardiovascular: Negative. Gastrointestinal: Negative. Endocrine: Negative. Genitourinary: Positive for vaginal bleeding. Negative for dysuria, frequency, menstrual problem, pelvic pain, urgency and vaginal discharge. Musculoskeletal: Negative. Skin: Negative. Allergic/Immunologic: Negative. Neurological: Negative. Hematological: Negative. Psychiatric/Behavioral: Negative.         Past Medical History:   Diagnosis Date    Heavy menses     \"constant\"    Hypothyroidism     PONV (postoperative nausea and vomiting)     RA (rheumatoid arthritis) ([facility] [address]        Past Surgical History:   Procedure Laterality Date    BREAST BIOPSY      R breast    COLONOSCOPY      DILATION AND CURETTAGE OF UTERUS N/A 6/19/2020    HYSTEROSCOPY, ENDOMETRIAL ABLATION WITH Edie Rodrigues, DILATION AND CURETTAGE performed by Priscilla Noble MD at [address] [place]      had procedure done similiar to tubal that prevents her from having kids       Family History   Problem Relation Age of Onset    Cancer Maternal Aunt         breast    Cancer Maternal Grandfather         colon    Cancer Paternal Grandmother         colon    Cancer Paternal Grandfather         colon    Cancer Other         paternal great aunt-breast    Breast Cancer Sister        Social History     Socioeconomic History    Marital status:      Spouse name: Not on file    Number of children: Not on file    Years of education: Not on file    Highest education level: Not on file Occupational History    Not on file   Social Needs    Financial resource strain: Not on file    Food insecurity     Worry: Not on file     Inability: Not on file    Transportation needs     Medical: Not on file     Non-medical: Not on file   Tobacco Use    Smoking status: Never Smoker    Smokeless tobacco: Never Used   Substance and Sexual Activity    Alcohol use: Never     Frequency: Never    Drug use: No    Sexual activity: Not on file   Lifestyle    Physical activity     Days per week: Not on file     Minutes per session: Not on file    Stress: Not on file   Relationships    Social connections     Talks on phone: Not on file     Gets together: Not on file     Attends Restorationism service: Not on file     Active member of club or organization: Not on file     Attends meetings of clubs or organizations: Not on file     Relationship status: Not on file    Intimate partner violence     Fear of current or ex partner: Not on file     Emotionally abused: Not on file     Physically abused: Not on file     Forced sexual activity: Not on file   Other Topics Concern    Not on file   Social History Narrative    Not on file         Current Outpatient Medications:     doxycycline hyclate (VIBRA-TABS) 100 MG tablet, Take 1 tablet by mouth 2 times daily for 14 days, Disp: 28 tablet, Rfl: 0    ibuprofen (ADVIL;MOTRIN) 800 MG tablet, Take 1 tablet by mouth every 8 hours as needed for Pain, Disp: 60 tablet, Rfl: 0    thyroid (ARMOUR) 60 MG tablet, Take 60 mg by mouth daily Indications: Underactive Thyroid, Disp: , Rfl:     ferrous gluconate (FERGON) 324 (38 Fe) MG tablet, Take 324 mg by mouth daily (with breakfast), Disp: , Rfl:     VITAMIN D PO, Take 5,000 Units by mouth daily Indications: vitamin D 3 5000u , Disp: , Rfl:     DHEA 10 MG CAPS, Take 1 capsule by mouth daily , Disp: , Rfl:     hydroxychloroquine (PLAQUENIL) 200 MG tablet, Take by mouth 2 times daily, Disp: , Rfl:     MAGNESIUM CARBONATE PO, Take 1 capsule by mouth daily , Disp: , Rfl:     Allergies   Allergen Reactions    Pcn [Penicillins] Rash    Other      surgiprep (used on her when she had breast biopsy)    Hibiclens [Chlorhexidine Gluconate] Rash     Possible; rash with surgical wash during breast surgery. /62   Ht 5' 10\" (1.778 m)   Wt 163 lb (73.9 kg)   BMI 23.39 kg/m²   Physical Exam  Constitutional:       General: She is not in acute distress. Appearance: She is well-developed. She is not diaphoretic. HENT:      Head: Normocephalic and atraumatic. Hair is normal.      Right Ear: Hearing and external ear normal. No decreased hearing noted. Left Ear: Hearing and external ear normal. No decreased hearing noted. Eyes:      General: No scleral icterus. Conjunctiva/sclera: Conjunctivae normal.      Pupils: Pupils are equal, round, and reactive to light. Neck:      Musculoskeletal: Normal range of motion. Pulmonary:      Effort: Pulmonary effort is normal. No respiratory distress. Abdominal:      General: There is no distension. Palpations: Abdomen is soft. There is no mass. Tenderness: There is no abdominal tenderness. There is no guarding or rebound. Genitourinary:     Labia:         Right: No rash, tenderness or lesion. Left: No rash, tenderness or lesion. Vagina: Bleeding present. Cervix: Normal.      Uterus: Normal. Not tender. Adnexa:         Right: No mass, tenderness or fullness. Left: No mass, tenderness or fullness. Rectum: Normal.   Musculoskeletal: Normal range of motion. General: No tenderness or deformity. Skin:     General: Skin is warm and dry. Coloration: Skin is not pale. Findings: No erythema or rash. Neurological:      Mental Status: She is alert and oriented to person, place, and time. Cranial Nerves: No cranial nerve deficit.    Psychiatric:         Speech: Speech normal.         Behavior: Behavior normal.

## 2020-08-21 ENCOUNTER — OFFICE VISIT (OUTPATIENT)
Dept: OTOLARYNGOLOGY | Facility: CLINIC | Age: 50
End: 2020-08-21

## 2020-08-21 VITALS
HEART RATE: 69 BPM | WEIGHT: 145.6 LBS | SYSTOLIC BLOOD PRESSURE: 108 MMHG | TEMPERATURE: 98.6 F | DIASTOLIC BLOOD PRESSURE: 68 MMHG | BODY MASS INDEX: 20.84 KG/M2 | HEIGHT: 70 IN

## 2020-08-21 DIAGNOSIS — H90.42 SENSORINEURAL HEARING LOSS (SNHL) OF LEFT EAR WITH UNRESTRICTED HEARING OF RIGHT EAR: Primary | ICD-10-CM

## 2020-08-21 DIAGNOSIS — H69.83 DYSFUNCTION OF BOTH EUSTACHIAN TUBES: ICD-10-CM

## 2020-08-21 PROCEDURE — 99214 OFFICE O/P EST MOD 30 MIN: CPT | Performed by: PHYSICIAN ASSISTANT

## 2020-08-21 RX ORDER — DOXYCYCLINE HYCLATE 100 MG
TABLET ORAL
COMMUNITY
Start: 2020-08-17 | End: 2021-06-02

## 2020-08-21 RX ORDER — MOMETASONE FUROATE 50 UG/1
2 SPRAY, METERED NASAL 2 TIMES DAILY
Qty: 17 G | Refills: 11 | Status: SHIPPED | OUTPATIENT
Start: 2020-08-21 | End: 2021-06-02

## 2020-08-21 RX ORDER — AZELASTINE 1 MG/ML
2 SPRAY, METERED NASAL 2 TIMES DAILY
Qty: 30 ML | Refills: 11 | Status: SHIPPED | OUTPATIENT
Start: 2020-08-21 | End: 2021-06-02

## 2020-09-09 ENCOUNTER — TELEPHONE (OUTPATIENT)
Dept: OBGYN | Age: 50
End: 2020-09-09

## 2020-09-09 NOTE — TELEPHONE ENCOUNTER
Returned call to pt re vaginal bleeding. Pt states she has bled daily since her ablation and it did not improve after antibiotics. Per Dr. Bobby Rock, a hysterectomy is the next treatment. TLH with Hal Antony scheduled for 10-13-20, pre op appt made. Pt v/u.

## 2020-10-05 ENCOUNTER — OFFICE VISIT (OUTPATIENT)
Dept: OBGYN | Age: 50
End: 2020-10-05
Payer: COMMERCIAL

## 2020-10-05 VITALS
WEIGHT: 166 LBS | HEIGHT: 70 IN | SYSTOLIC BLOOD PRESSURE: 110 MMHG | DIASTOLIC BLOOD PRESSURE: 72 MMHG | BODY MASS INDEX: 23.77 KG/M2

## 2020-10-05 PROCEDURE — 99213 OFFICE O/P EST LOW 20 MIN: CPT | Performed by: OBSTETRICS & GYNECOLOGY

## 2020-10-05 ASSESSMENT — ENCOUNTER SYMPTOMS
GASTROINTESTINAL NEGATIVE: 1
EYES NEGATIVE: 1
ALLERGIC/IMMUNOLOGIC NEGATIVE: 1
RESPIRATORY NEGATIVE: 1

## 2020-10-05 NOTE — PROGRESS NOTES
Ace Bravo is a 44 y.o. With h/o AUB s/p endometrial ablation who desires definitive therapy. She presents for pre op exam for a TLH with VAZQUEZ Rose, Cystoscopy on 10-13-20 for AUB, s/p endometrial ablation. Review of Systems   Constitutional: Negative. HENT: Negative. Eyes: Negative. Respiratory: Negative. Cardiovascular: Negative. Gastrointestinal: Negative. Endocrine: Negative. Genitourinary: Positive for menstrual problem. Negative for dysuria, frequency, pelvic pain, urgency and vaginal discharge. Musculoskeletal: Negative. Skin: Negative. Allergic/Immunologic: Negative. Neurological: Negative. Hematological: Negative. Psychiatric/Behavioral: Negative.         Past Medical History:   Diagnosis Date    Heavy menses     \"constant\"    Hypothyroidism     PONV (postoperative nausea and vomiting)     RA (rheumatoid arthritis) ([facility])        Past Surgical History:   Procedure Laterality Date    BREAST BIOPSY      R breast    COLONOSCOPY      DILATION AND CURETTAGE OF UTERUS N/A 6/19/2020    HYSTEROSCOPY, ENDOMETRIAL ABLATION WITH Jonathan Sol, DILATION AND CURETTAGE performed by Nina Chawla MD at [address] [place] [address] Said      had procedure done similiar to tubal that prevents her from having kids       Family History   Problem Relation Age of Onset    Cancer Maternal Aunt         breast    Cancer Maternal Grandfather         colon    Cancer Paternal Grandmother         colon    Cancer Paternal Grandfather         colon    Cancer Other         paternal great aunt-breast    Breast Cancer Sister        Social History     Socioeconomic History    Marital status:      Spouse name: Not on file    Number of children: Not on file    Years of education: Not on file    Highest education level: Not on file   Occupational History    Not on file   Social Needs    Financial resource strain: Not on file    Food insecurity     Worry: Not on file     Inability: Not on file    Transportation needs     Medical: Not on file     Non-medical: Not on file   Tobacco Use    Smoking status: Never Smoker    Smokeless tobacco: Never Used   Substance and Sexual Activity    Alcohol use: Never     Frequency: Never    Drug use: No    Sexual activity: Not on file   Lifestyle    Physical activity     Days per week: Not on file     Minutes per session: Not on file    Stress: Not on file   Relationships    Social connections     Talks on phone: Not on file     Gets together: Not on file     Attends Mandaeism service: Not on file     Active member of club or organization: Not on file     Attends meetings of clubs or organizations: Not on file     Relationship status: Not on file    Intimate partner violence     Fear of current or ex partner: Not on file     Emotionally abused: Not on file     Physically abused: Not on file     Forced sexual activity: Not on file   Other Topics Concern    Not on file   Social History Narrative    Not on file         Current Outpatient Medications:     ibuprofen (ADVIL;MOTRIN) 800 MG tablet, Take 1 tablet by mouth every 8 hours as needed for Pain, Disp: 60 tablet, Rfl: 0    thyroid (ARMOUR) 60 MG tablet, Take 60 mg by mouth daily Indications: Underactive Thyroid, Disp: , Rfl:     ferrous gluconate (FERGON) 324 (38 Fe) MG tablet, Take 324 mg by mouth daily (with breakfast), Disp: , Rfl:     VITAMIN D PO, Take 5,000 Units by mouth daily Indications: vitamin D 3 5000u , Disp: , Rfl:     DHEA 10 MG CAPS, Take 1 capsule by mouth daily , Disp: , Rfl:     hydroxychloroquine (PLAQUENIL) 200 MG tablet, Take by mouth 2 times daily, Disp: , Rfl:     MAGNESIUM CARBONATE PO, Take 1 capsule by mouth daily , Disp: , Rfl:     Allergies   Allergen Reactions    Pcn [Penicillins] Rash    Other      surgiprep (used on her when she had breast biopsy)    Hibiclens [Chlorhexidine Gluconate] Rash     Possible; rash with surgical wash during breast surgery. /72   Ht 5' 10\" (1.778 m)   Wt 166 lb (75.3 kg)   BMI 23.82 kg/m²   Physical Exam  Constitutional:       General: She is not in acute distress. Appearance: Normal appearance. She is well-developed. She is not ill-appearing, toxic-appearing or diaphoretic. HENT:      Head: Normocephalic and atraumatic. Hair is normal.      Right Ear: Hearing and external ear normal. No decreased hearing noted. Left Ear: Hearing and external ear normal. No decreased hearing noted. Nose: Nose normal. No rhinorrhea. Mouth/Throat:      Dentition: Normal dentition. Eyes:      General: No scleral icterus. Right eye: No discharge. Left eye: No discharge. Extraocular Movements: Extraocular movements intact. Neck:      Musculoskeletal: Normal range of motion. Pulmonary:      Effort: Pulmonary effort is normal. No respiratory distress. Musculoskeletal: Normal range of motion. General: No deformity. Skin:     General: Skin is warm and dry. Coloration: Skin is not pale. Findings: No rash. Neurological:      Mental Status: She is alert and oriented to person, place, and time. Cranial Nerves: No cranial nerve deficit. Psychiatric:         Mood and Affect: Mood normal.         Speech: Speech normal.         Behavior: Behavior normal.         Thought Content: Thought content normal.         Judgment: Judgment normal.               Assessment   Diagnosis Orders   1. Irregular bleeding     2. History of endometrial ablation     3. Pre-op exam         Plan     1. Consent signed for Total Laparoscopic Hysterectomy, BSO, cystoscopy on 10-13-20. Procedure and risks explained. Risks include bleeding, infection, injury to bowels, bladder and ureters. All questions answered. 2. RTC 2 weeks post op  3. Pt states she wants her ovaries removed, but may change her mind and will let surgery know on day of surgery.    Clementeen Seats, am scribing for and in the presence of Dr. Hayden Kumar. I, Dr. Hayden Kumar, personally performed the services described in this documentation as scribed by Bobbi Stoddard in my presence, and it is both accurate and complete.

## 2020-10-05 NOTE — PATIENT INSTRUCTIONS
Patient Education        Laparoscopic Hysterectomy: Before Your Surgery  What is a laparoscopic hysterectomy? A hysterectomy is surgery to take out the uterus. In some cases, the ovaries and fallopian tubes also are taken out at the same time. The doctor makes one or more small cuts in the belly. These cuts are called incisions. They let the doctor insert tools to do the surgery. One of these tools is a tube with a light on it. It's called a laparoscope, or scope. The scope and the other tools allow the doctor to free the uterus. The doctor then removes the uterus through the small cuts. In a total hysterectomy, the doctor takes out the uterus and the cervix. In a supracervical hysterectomy, only the uterus is taken out. Most women go home in 1 to 2 days. You may need about 4 to 6 weeks to fully recover. After the surgery, you will not have periods. You will not be able to get pregnant. If there is a chance that you will want to have a baby, talk to your doctor about other treatment options. Your doctor may advise you to take hormone pills if your ovaries are removed. Your doctor will talk to you about the risks and benefits of hormones. He or she will also tell you how long to take them. This surgery probably won't lower your interest in sex. In fact, some women enjoy sex more. This may be because they no longer have to worry about birth control or heavy bleeding. Follow-up care is a key part of your treatment and safety. Be sure to make and go to all appointments, and call your doctor if you are having problems. It's also a good idea to know your test results and keep a list of the medicines you take. How do you prepare for surgery? Surgery can be stressful. This information will help you understand what you can expect. And it will help you safely prepare for surgery. Preparing for surgery  · Be sure you have someone to take you home.  Anesthesia and pain medicine will make it unsafe for you to drive or get home on your own. · Understand exactly what surgery is planned, along with the risks, benefits, and other options. · If you take aspirin or some other blood thinner, ask your doctor if you should stop taking it before your surgery. Make sure that you understand exactly what your doctor wants you to do. These medicines increase the risk of bleeding. · Tell your doctor ALL the medicines, vitamins, supplements, and herbal remedies you take. Some may increase the risk of problems during your surgery. Your doctor will tell you if you should stop taking any of them before the surgery and how soon to do it. · Make sure your doctor and the hospital have a copy of your advance directive. If you don't have one, you may want to prepare one. It lets others know your health care wishes. It's a good thing to have before any type of surgery or procedure. What happens on the day of surgery? · Follow the instructions exactly about when to stop eating and drinking. If you don't, your surgery may be canceled. If your doctor told you to take your medicines on the day of surgery, take them with only a sip of water. · Take a bath or shower before you come in for your surgery. Do not apply lotions, perfumes, deodorants, or nail polish. · Do not shave the surgical site yourself. · Take off all jewelry and piercings. And take out contact lenses, if you wear them. At the hospital or surgery center    · Bring a picture ID. · The area for surgery is often marked to make sure there are no errors. · You will be kept comfortable and safe by your anesthesia provider. You will be asleep during the surgery. · The surgery will take about 2 to 4 hours. When should you call your doctor? · You have questions or concerns. · You don't understand how to prepare for your surgery. · You become ill before the surgery (such as fever, flu, or a cold).   · You need to reschedule or have changed your mind about having the until your doctor tells you it is okay. · Ask your doctor when it is okay for you to have sex. Diet  · You can eat your normal diet. If your stomach is upset, try bland, low-fat foods like plain rice, broiled chicken, toast, and yogurt. · If your bowel movements are not regular right after surgery, try to avoid constipation and straining. Drink plenty of water. Your doctor may suggest fiber, a stool softener, or a mild laxative. Medicines  · Your doctor will tell you if and when you can restart your medicines. He or she will also give you instructions about taking any new medicines. · If you take aspirin or some other blood thinner, ask your doctor if and when to start taking it again. Make sure that you understand exactly what your doctor wants you to do. · Be safe with medicines. Read and follow all instructions on the label. ? If the doctor gave you a prescription medicine for pain, take it as prescribed. ? If you are not taking a prescription pain medicine, ask your doctor if you can take an over-the-counter medicine. · If your doctor prescribed antibiotics, take them as directed. Do not stop taking them just because you feel better. You need to take the full course of antibiotics. Incision care  · You may have stitches over the cuts (incisions) the doctor made in your belly. · If you have strips of tape on the cut (incision) the doctor made, leave the tape on for a week or until it falls off. · Wash the area daily with warm, soapy water, and pat it dry. Don't use hydrogen peroxide or alcohol. They can slow healing. · You may cover the area with a gauze bandage if it oozes fluid or rubs against clothing. · Change the bandage every day. Other instructions  · You may have some light vaginal bleeding. Wear sanitary pads if needed. Do not douche or use tampons. · Don't have sex until the doctor says it is okay. Follow-up care is a key part of your treatment and safety.  Be sure to make and go to all appointments, and call your doctor if you are having problems. It's also a good idea to know your test results and keep a list of the medicines you take. When should you call for help? [id number] anytime you think you may need emergency care. For example, call if:  · You passed out (lost consciousness). · You have chest pain, are short of breath, or cough up blood. Call your doctor now or seek immediate medical care if:  · You have pain that does not get better after you take pain medicine. · You cannot pass stools or gas. · You have vaginal discharge that has increased in amount or smells bad. · You are sick to your stomach or cannot drink fluids. · You have loose stitches, or your incision comes open. · Bright red blood has soaked through the bandage over your incision. · You have signs of infection, such as:  ? Increased pain, swelling, warmth, or redness. ? Red streaks leading from the incision. ? Pus draining from the incision. ? A fever. · You have bright red vaginal bleeding that soaks one or more pads in an hour, or you have large clots. · You have signs of a blood clot in your leg (called a deep vein thrombosis), such as:  ? Pain in your calf, back of the knee, thigh, or groin. ? Redness and swelling in your leg or groin. Watch closely for changes in your health, and be sure to contact your doctor if you have any problems. Where can you learn more? Go to https://[employer]. org and sign in to your [employer] account. Enter Q131 in the [facility] box to learn more about \"Laparoscopic Hysterectomy: What to Expect at Home. \"     If you do not have an account, please click on the \"Sign Up Now\" link. Current as of: November 8, 2019               Content Version: 12.5  © 4758-8611 Healthwise, Incorporated. Care instructions adapted under license by [facility] ([facility]).  If you have questions about a medical condition or this instruction, always ask your healthcare professional. [address] any warranty or liability for your use of this information.

## 2020-10-09 ENCOUNTER — HOSPITAL ENCOUNTER (OUTPATIENT)
Dept: PREADMISSION TESTING | Age: 50
Discharge: HOME | End: 2020-10-13
Payer: COMMERCIAL

## 2020-10-09 VITALS — WEIGHT: 168 LBS | BODY MASS INDEX: 24.05 KG/M2 | HEIGHT: 70 IN

## 2020-10-09 LAB
ABO/RH: NORMAL
ANTIBODY SCREEN: NORMAL
EKG P AXIS: 71 DEGREES
EKG P-R INTERVAL: 144 MS
EKG Q-T INTERVAL: 438 MS
EKG QRS DURATION: 84 MS
EKG QTC CALCULATION (BAZETT): 437 MS
EKG T AXIS: 46 DEGREES
SARS-COV-2, PCR: NOT DETECTED

## 2020-10-09 PROCEDURE — 86901 BLOOD TYPING SEROLOGIC RH(D): CPT

## 2020-10-09 PROCEDURE — U0003 INFECTIOUS AGENT DETECTION BY NUCLEIC ACID (DNA OR RNA); SEVERE ACUTE RESPIRATORY SYNDROME CORONAVIRUS 2 (SARS-COV-2) (CORONAVIRUS DISEASE [COVID-19]), AMPLIFIED PROBE TECHNIQUE, MAKING USE OF HIGH THROUGHPUT TECHNOLOGIES AS DESCRIBED BY CMS-2020-01-R: HCPCS

## 2020-10-09 PROCEDURE — 93005 ELECTROCARDIOGRAM TRACING: CPT | Performed by: OBSTETRICS & GYNECOLOGY

## 2020-10-09 PROCEDURE — 86900 BLOOD TYPING SEROLOGIC ABO: CPT

## 2020-10-09 PROCEDURE — 86850 RBC ANTIBODY SCREEN: CPT

## 2020-10-09 RX ORDER — PHENAZOPYRIDINE HYDROCHLORIDE 100 MG/1
100 TABLET, FILM COATED ORAL ONCE
Status: CANCELLED | OUTPATIENT
Start: 2020-10-13

## 2020-10-13 ENCOUNTER — ANESTHESIA (OUTPATIENT)
Dept: OPERATING ROOM | Age: 50
End: 2020-10-13
Payer: COMMERCIAL

## 2020-10-13 ENCOUNTER — ANESTHESIA EVENT (OUTPATIENT)
Dept: OPERATING ROOM | Age: 50
End: 2020-10-13
Payer: COMMERCIAL

## 2020-10-13 ENCOUNTER — HOSPITAL ENCOUNTER (OUTPATIENT)
Age: 50
Setting detail: OUTPATIENT SURGERY
Discharge: HOME | End: 2020-10-13
Attending: OBSTETRICS & GYNECOLOGY | Admitting: OBSTETRICS & GYNECOLOGY
Payer: COMMERCIAL

## 2020-10-13 VITALS
DIASTOLIC BLOOD PRESSURE: 50 MMHG | SYSTOLIC BLOOD PRESSURE: 89 MMHG | OXYGEN SATURATION: 100 % | RESPIRATION RATE: 10 BRPM | TEMPERATURE: 95.4 F

## 2020-10-13 VITALS
DIASTOLIC BLOOD PRESSURE: 50 MMHG | BODY MASS INDEX: 24.05 KG/M2 | HEART RATE: 72 BPM | OXYGEN SATURATION: 99 % | TEMPERATURE: 97.2 F | HEIGHT: 70 IN | RESPIRATION RATE: 18 BRPM | WEIGHT: 168 LBS | SYSTOLIC BLOOD PRESSURE: 102 MMHG

## 2020-10-13 LAB
ABO/RH: NORMAL
ANTIBODY SCREEN: NORMAL
HCG(URINE) PREGNANCY TEST: NEGATIVE

## 2020-10-13 PROCEDURE — 2709999900 HC NON-CHARGEABLE SUPPLY: Performed by: OBSTETRICS & GYNECOLOGY

## 2020-10-13 PROCEDURE — 88307 TISSUE EXAM BY PATHOLOGIST: CPT

## 2020-10-13 PROCEDURE — 3700000000 HC ANESTHESIA ATTENDED CARE: Performed by: OBSTETRICS & GYNECOLOGY

## 2020-10-13 PROCEDURE — 6370000000 HC RX 637 (ALT 250 FOR IP): Performed by: OBSTETRICS & GYNECOLOGY

## 2020-10-13 PROCEDURE — 36415 COLL VENOUS BLD VENIPUNCTURE: CPT

## 2020-10-13 PROCEDURE — 7100000001 HC PACU RECOVERY - ADDTL 15 MIN: Performed by: OBSTETRICS & GYNECOLOGY

## 2020-10-13 PROCEDURE — 3600000009 HC SURGERY ROBOT BASE: Performed by: OBSTETRICS & GYNECOLOGY

## 2020-10-13 PROCEDURE — 7100000011 HC PHASE II RECOVERY - ADDTL 15 MIN: Performed by: OBSTETRICS & GYNECOLOGY

## 2020-10-13 PROCEDURE — 2580000003 HC RX 258: Performed by: ANESTHESIOLOGY

## 2020-10-13 PROCEDURE — 7100000010 HC PHASE II RECOVERY - FIRST 15 MIN: Performed by: OBSTETRICS & GYNECOLOGY

## 2020-10-13 PROCEDURE — 86901 BLOOD TYPING SEROLOGIC RH(D): CPT

## 2020-10-13 PROCEDURE — 2500000003 HC RX 250 WO HCPCS: Performed by: ANESTHESIOLOGY

## 2020-10-13 PROCEDURE — 2500000003 HC RX 250 WO HCPCS: Performed by: OBSTETRICS & GYNECOLOGY

## 2020-10-13 PROCEDURE — 58573 TLH W/T/O UTERUS OVER 250 G: CPT | Performed by: OBSTETRICS & GYNECOLOGY

## 2020-10-13 PROCEDURE — 3600000019 HC SURGERY ROBOT ADDTL 15MIN: Performed by: OBSTETRICS & GYNECOLOGY

## 2020-10-13 PROCEDURE — 6360000002 HC RX W HCPCS: Performed by: ANESTHESIOLOGY

## 2020-10-13 PROCEDURE — 2720000010 HC SURG SUPPLY STERILE: Performed by: OBSTETRICS & GYNECOLOGY

## 2020-10-13 PROCEDURE — 2500000003 HC RX 250 WO HCPCS: Performed by: NURSE ANESTHETIST, CERTIFIED REGISTERED

## 2020-10-13 PROCEDURE — S2900 ROBOTIC SURGICAL SYSTEM: HCPCS | Performed by: OBSTETRICS & GYNECOLOGY

## 2020-10-13 PROCEDURE — 84703 CHORIONIC GONADOTROPIN ASSAY: CPT

## 2020-10-13 PROCEDURE — 6360000002 HC RX W HCPCS: Performed by: NURSE ANESTHETIST, CERTIFIED REGISTERED

## 2020-10-13 PROCEDURE — 3700000001 HC ADD 15 MINUTES (ANESTHESIA): Performed by: OBSTETRICS & GYNECOLOGY

## 2020-10-13 PROCEDURE — 7100000000 HC PACU RECOVERY - FIRST 15 MIN: Performed by: OBSTETRICS & GYNECOLOGY

## 2020-10-13 PROCEDURE — 86900 BLOOD TYPING SEROLOGIC ABO: CPT

## 2020-10-13 PROCEDURE — 86850 RBC ANTIBODY SCREEN: CPT

## 2020-10-13 PROCEDURE — 6370000000 HC RX 637 (ALT 250 FOR IP): Performed by: ANESTHESIOLOGY

## 2020-10-13 RX ORDER — MORPHINE SULFATE 4 MG/ML
4 INJECTION, SOLUTION INTRAMUSCULAR; INTRAVENOUS
Status: DISCONTINUED | OUTPATIENT
Start: 2020-10-13 | End: 2020-10-13 | Stop reason: HOSPADM

## 2020-10-13 RX ORDER — OXYCODONE HYDROCHLORIDE AND ACETAMINOPHEN 5; 325 MG/1; MG/1
1 TABLET ORAL ONCE
Status: COMPLETED | OUTPATIENT
Start: 2020-10-13 | End: 2020-10-13

## 2020-10-13 RX ORDER — SERTRALINE HYDROCHLORIDE 100 MG/1
100 TABLET, FILM COATED ORAL DAILY
COMMUNITY

## 2020-10-13 RX ORDER — FENTANYL CITRATE 50 UG/ML
INJECTION, SOLUTION INTRAMUSCULAR; INTRAVENOUS PRN
Status: DISCONTINUED | OUTPATIENT
Start: 2020-10-13 | End: 2020-10-13 | Stop reason: SDUPTHER

## 2020-10-13 RX ORDER — MIDAZOLAM HYDROCHLORIDE 1 MG/ML
2 INJECTION INTRAMUSCULAR; INTRAVENOUS
Status: DISCONTINUED | OUTPATIENT
Start: 2020-10-13 | End: 2020-10-13 | Stop reason: HOSPADM

## 2020-10-13 RX ORDER — SODIUM CHLORIDE 0.9 % (FLUSH) 0.9 %
10 SYRINGE (ML) INJECTION EVERY 12 HOURS SCHEDULED
Status: DISCONTINUED | OUTPATIENT
Start: 2020-10-13 | End: 2020-10-13 | Stop reason: HOSPADM

## 2020-10-13 RX ORDER — CEFAZOLIN SODIUM 1 G/3ML
INJECTION, POWDER, FOR SOLUTION INTRAMUSCULAR; INTRAVENOUS PRN
Status: DISCONTINUED | OUTPATIENT
Start: 2020-10-13 | End: 2020-10-13 | Stop reason: SDUPTHER

## 2020-10-13 RX ORDER — APREPITANT 40 MG/1
40 CAPSULE ORAL ONCE
Status: COMPLETED | OUTPATIENT
Start: 2020-10-13 | End: 2020-10-13

## 2020-10-13 RX ORDER — LIDOCAINE HYDROCHLORIDE 10 MG/ML
1 INJECTION, SOLUTION EPIDURAL; INFILTRATION; INTRACAUDAL; PERINEURAL
Status: DISCONTINUED | OUTPATIENT
Start: 2020-10-13 | End: 2020-10-13 | Stop reason: HOSPADM

## 2020-10-13 RX ORDER — METOCLOPRAMIDE HYDROCHLORIDE 5 MG/ML
10 INJECTION INTRAMUSCULAR; INTRAVENOUS ONCE
Status: COMPLETED | OUTPATIENT
Start: 2020-10-13 | End: 2020-10-13

## 2020-10-13 RX ORDER — SODIUM CHLORIDE, SODIUM LACTATE, POTASSIUM CHLORIDE, CALCIUM CHLORIDE 600; 310; 30; 20 MG/100ML; MG/100ML; MG/100ML; MG/100ML
INJECTION, SOLUTION INTRAVENOUS CONTINUOUS
Status: DISCONTINUED | OUTPATIENT
Start: 2020-10-13 | End: 2020-10-13 | Stop reason: HOSPADM

## 2020-10-13 RX ORDER — IBUPROFEN 800 MG/1
800 TABLET ORAL EVERY 8 HOURS PRN
Qty: 90 TABLET | Refills: 0 | Status: SHIPPED | OUTPATIENT
Start: 2020-10-13

## 2020-10-13 RX ORDER — KETOROLAC TROMETHAMINE 30 MG/ML
INJECTION, SOLUTION INTRAMUSCULAR; INTRAVENOUS PRN
Status: DISCONTINUED | OUTPATIENT
Start: 2020-10-13 | End: 2020-10-13 | Stop reason: SDUPTHER

## 2020-10-13 RX ORDER — PHENAZOPYRIDINE HYDROCHLORIDE 100 MG/1
100 TABLET, FILM COATED ORAL ONCE
Status: COMPLETED | OUTPATIENT
Start: 2020-10-13 | End: 2020-10-13

## 2020-10-13 RX ORDER — MIDAZOLAM HYDROCHLORIDE 1 MG/ML
INJECTION INTRAMUSCULAR; INTRAVENOUS PRN
Status: DISCONTINUED | OUTPATIENT
Start: 2020-10-13 | End: 2020-10-13 | Stop reason: SDUPTHER

## 2020-10-13 RX ORDER — OXYCODONE HYDROCHLORIDE AND ACETAMINOPHEN 5; 325 MG/1; MG/1
1 TABLET ORAL EVERY 6 HOURS PRN
Qty: 20 TABLET | Refills: 0 | Status: SHIPPED | OUTPATIENT
Start: 2020-10-13 | End: 2020-10-18

## 2020-10-13 RX ORDER — FENTANYL CITRATE 50 UG/ML
50 INJECTION, SOLUTION INTRAMUSCULAR; INTRAVENOUS
Status: DISCONTINUED | OUTPATIENT
Start: 2020-10-13 | End: 2020-10-13 | Stop reason: HOSPADM

## 2020-10-13 RX ORDER — ROCURONIUM BROMIDE 10 MG/ML
INJECTION, SOLUTION INTRAVENOUS PRN
Status: DISCONTINUED | OUTPATIENT
Start: 2020-10-13 | End: 2020-10-13 | Stop reason: SDUPTHER

## 2020-10-13 RX ORDER — DEXAMETHASONE SODIUM PHOSPHATE 10 MG/ML
INJECTION, SOLUTION INTRAMUSCULAR; INTRAVENOUS PRN
Status: DISCONTINUED | OUTPATIENT
Start: 2020-10-13 | End: 2020-10-13 | Stop reason: SDUPTHER

## 2020-10-13 RX ORDER — BUPIVACAINE HYDROCHLORIDE AND EPINEPHRINE 2.5; 5 MG/ML; UG/ML
INJECTION, SOLUTION INFILTRATION; PERINEURAL PRN
Status: DISCONTINUED | OUTPATIENT
Start: 2020-10-13 | End: 2020-10-13 | Stop reason: ALTCHOICE

## 2020-10-13 RX ORDER — SODIUM CHLORIDE 0.9 % (FLUSH) 0.9 %
10 SYRINGE (ML) INJECTION PRN
Status: DISCONTINUED | OUTPATIENT
Start: 2020-10-13 | End: 2020-10-13 | Stop reason: HOSPADM

## 2020-10-13 RX ORDER — ONDANSETRON 2 MG/ML
INJECTION INTRAMUSCULAR; INTRAVENOUS PRN
Status: DISCONTINUED | OUTPATIENT
Start: 2020-10-13 | End: 2020-10-13 | Stop reason: SDUPTHER

## 2020-10-13 RX ORDER — EPHEDRINE SULFATE 50 MG/ML
INJECTION, SOLUTION INTRAVENOUS PRN
Status: DISCONTINUED | OUTPATIENT
Start: 2020-10-13 | End: 2020-10-13 | Stop reason: SDUPTHER

## 2020-10-13 RX ORDER — SCOLOPAMINE TRANSDERMAL SYSTEM 1 MG/1
1 PATCH, EXTENDED RELEASE TRANSDERMAL ONCE
Status: DISCONTINUED | OUTPATIENT
Start: 2020-10-13 | End: 2020-10-13 | Stop reason: HOSPADM

## 2020-10-13 RX ORDER — LIDOCAINE HYDROCHLORIDE 10 MG/ML
INJECTION, SOLUTION EPIDURAL; INFILTRATION; INTRACAUDAL; PERINEURAL PRN
Status: DISCONTINUED | OUTPATIENT
Start: 2020-10-13 | End: 2020-10-13 | Stop reason: SDUPTHER

## 2020-10-13 RX ORDER — PROPOFOL 10 MG/ML
INJECTION, EMULSION INTRAVENOUS PRN
Status: DISCONTINUED | OUTPATIENT
Start: 2020-10-13 | End: 2020-10-13 | Stop reason: SDUPTHER

## 2020-10-13 RX ADMIN — PHENAZOPYRIDINE HYDROCHLORIDE 100 MG: 100 TABLET ORAL at 08:34

## 2020-10-13 RX ADMIN — SUGAMMADEX 200 MG: 100 INJECTION, SOLUTION INTRAVENOUS at 11:17

## 2020-10-13 RX ADMIN — PROPOFOL 50 MG: 10 INJECTION, EMULSION INTRAVENOUS at 09:54

## 2020-10-13 RX ADMIN — SODIUM CHLORIDE, SODIUM LACTATE, POTASSIUM CHLORIDE, AND CALCIUM CHLORIDE: 600; 310; 30; 20 INJECTION, SOLUTION INTRAVENOUS at 10:20

## 2020-10-13 RX ADMIN — EPHEDRINE SULFATE 10 MG: 50 INJECTION INTRAMUSCULAR; INTRAVENOUS; SUBCUTANEOUS at 10:16

## 2020-10-13 RX ADMIN — MIDAZOLAM 2 MG: 1 INJECTION INTRAMUSCULAR; INTRAVENOUS at 09:49

## 2020-10-13 RX ADMIN — DEXAMETHASONE SODIUM PHOSPHATE 10 MG: 10 INJECTION, SOLUTION INTRAMUSCULAR; INTRAVENOUS at 10:04

## 2020-10-13 RX ADMIN — HYDROMORPHONE HYDROCHLORIDE 0.5 MG: 1 INJECTION, SOLUTION INTRAMUSCULAR; INTRAVENOUS; SUBCUTANEOUS at 11:29

## 2020-10-13 RX ADMIN — APREPITANT 40 MG: 40 CAPSULE ORAL at 08:34

## 2020-10-13 RX ADMIN — FENTANYL CITRATE 100 MCG: 50 INJECTION INTRAMUSCULAR; INTRAVENOUS at 09:51

## 2020-10-13 RX ADMIN — ONDANSETRON HYDROCHLORIDE 4 MG: 2 INJECTION, SOLUTION INTRAMUSCULAR; INTRAVENOUS at 11:14

## 2020-10-13 RX ADMIN — SODIUM CHLORIDE, SODIUM LACTATE, POTASSIUM CHLORIDE, AND CALCIUM CHLORIDE: 600; 310; 30; 20 INJECTION, SOLUTION INTRAVENOUS at 08:34

## 2020-10-13 RX ADMIN — LIDOCAINE HYDROCHLORIDE 50 MG: 10 INJECTION, SOLUTION EPIDURAL; INFILTRATION; INTRACAUDAL; PERINEURAL at 09:53

## 2020-10-13 RX ADMIN — KETOROLAC TROMETHAMINE 30 MG: 30 INJECTION, SOLUTION INTRAMUSCULAR; INTRAVENOUS at 11:27

## 2020-10-13 RX ADMIN — METOCLOPRAMIDE 10 MG: 5 INJECTION, SOLUTION INTRAMUSCULAR; INTRAVENOUS at 13:19

## 2020-10-13 RX ADMIN — OXYCODONE HYDROCHLORIDE AND ACETAMINOPHEN 1 TABLET: 5; 325 TABLET ORAL at 13:27

## 2020-10-13 RX ADMIN — FAMOTIDINE 20 MG: 10 INJECTION, SOLUTION INTRAVENOUS at 08:34

## 2020-10-13 RX ADMIN — KETOROLAC TROMETHAMINE 30 MG: 30 INJECTION, SOLUTION INTRAMUSCULAR; INTRAVENOUS at 11:31

## 2020-10-13 RX ADMIN — CEFAZOLIN SODIUM 1000 MG: 1 INJECTION, POWDER, FOR SOLUTION INTRAMUSCULAR; INTRAVENOUS at 10:08

## 2020-10-13 RX ADMIN — HYDROMORPHONE HYDROCHLORIDE 0.5 MG: 1 INJECTION, SOLUTION INTRAMUSCULAR; INTRAVENOUS; SUBCUTANEOUS at 11:19

## 2020-10-13 RX ADMIN — ROCURONIUM BROMIDE 10 MG: 10 INJECTION, SOLUTION INTRAVENOUS at 10:31

## 2020-10-13 RX ADMIN — ROCURONIUM BROMIDE 50 MG: 10 INJECTION, SOLUTION INTRAVENOUS at 09:54

## 2020-10-13 RX ADMIN — PROPOFOL 150 MG: 10 INJECTION, EMULSION INTRAVENOUS at 09:53

## 2020-10-13 ASSESSMENT — PAIN SCALES - GENERAL
PAINLEVEL_OUTOF10: 0
PAINLEVEL_OUTOF10: 0
PAINLEVEL_OUTOF10: 3
PAINLEVEL_OUTOF10: 3

## 2020-10-13 ASSESSMENT — ENCOUNTER SYMPTOMS: SHORTNESS OF BREATH: 0

## 2020-10-13 ASSESSMENT — LIFESTYLE VARIABLES: SMOKING_STATUS: 0

## 2020-10-13 NOTE — H&P
JOSE FRANCISCO Martin is a 44 y.o. female with h/o  who presents with h/o AUB s/p endometrial ablation desiring definitive management. She is doing well without complaints. Past Medical History:   Diagnosis Date    Heavy menses     \"constant\"    Hypothyroidism     PONV (postoperative nausea and vomiting)     RA (rheumatoid arthritis) ([facility])     ra     Past Surgical History:   Procedure Laterality Date    BREAST BIOPSY      R breast    COLONOSCOPY      DILATION AND CURETTAGE OF UTERUS N/A 6/19/2020    HYSTEROSCOPY, ENDOMETRIAL ABLATION WITH Lisette Johnson, DILATION AND CURETTAGE performed by Kitty Fields MD at [facility]      had procedure done similiar to tubal that prevents her from having kids     Family History   Problem Relation Age of Onset    Cancer Maternal Aunt         breast    Cancer Maternal Grandfather         colon    Cancer Paternal Grandmother         colon    Cancer Paternal Grandfather         colon    Cancer Other         paternal great aunt-breast    Breast Cancer Sister      Social History     Tobacco Use    Smoking status: Never Smoker    Smokeless tobacco: Never Used   Substance Use Topics    Alcohol use: Never     Frequency: Never    Drug use: No     No current facility-administered medications on file prior to encounter.       Current Outpatient Medications on File Prior to Encounter   Medication Sig Dispense Refill    sertraline (ZOLOFT) 100 MG tablet Take 100 mg by mouth daily      thyroid (ARMOUR) 60 MG tablet Take 90 mg by mouth daily Indications: Underactive Thyroid       ferrous gluconate (FERGON) 324 (38 Fe) MG tablet Take 324 mg by mouth daily (with breakfast)      VITAMIN D PO Take 5,000 Units by mouth daily Indications: vitamin D 3 5000u       DHEA 10 MG CAPS Take 1 capsule by mouth daily       hydroxychloroquine (PLAQUENIL) 200 MG tablet Take 200 mg by mouth 2 times daily       MAGNESIUM CARBONATE PO Take 1 capsule by mouth daily Allergies   Allergen Reactions    Other Rash     surgiprep (used on her when she had breast biopsy)    Pcn [Penicillins] Rash    Hibiclens [Chlorhexidine Gluconate] Rash     Possible; rash with surgical wash during breast surgery. /71   Pulse 68   Temp 98 °F (36.7 °C) (Infrared)   Resp 14   Ht 5' 10\" (1.778 m)   Wt 168 lb (76.2 kg)   LMP 10/02/2020   SpO2 100%   Breastfeeding No   BMI 24.11 kg/m²   Physical Exam  Constitutional:       General: She is not in acute distress. Appearance: She is well-developed. HENT:      Head: Normocephalic and atraumatic. Nose: No rhinorrhea. Neck:      Musculoskeletal: Normal range of motion and neck supple. Cardiovascular:      Rate and Rhythm: Normal rate and regular rhythm. Pulmonary:      Effort: Pulmonary effort is normal. No respiratory distress. Breath sounds: Normal breath sounds. Abdominal:      General: There is no distension. Palpations: Abdomen is soft. Tenderness: There is no abdominal tenderness. Musculoskeletal: Normal range of motion. Skin:     General: Skin is warm and dry. Neurological:      Mental Status: She is alert and oriented to person, place, and time. Psychiatric:         Behavior: Behavior normal.         Thought Content: Thought content normal.         Judgment: Judgment normal.           Assessment  1. Menorrhagia  2. S/p endometrial ablation    Plan  To OR for TLH, BSO, cystoscopy. Risks of TLH including bleeding, infection, injury to bowel/bladder/ureters and need for future surgery. Patient states understanding. All questions answered. Consent signed.

## 2020-10-13 NOTE — ANESTHESIA POSTPROCEDURE EVALUATION
Department of Anesthesiology  Postprocedure Note    Patient: Kate Em  MRN: [id number]  YOB: 1970  Date of evaluation: 10/13/2020  Time:  11:45 AM     Procedure Summary     Date:  10/13/20 Room / Location:  [address]    Anesthesia Start:  8312 Anesthesia Stop:  4718    Procedure:  EXAM UNDER ANESTHESIA TLH W/ DAVINCI BILATERAL SALPINGO OOPHORECTOMY CYSTOSCOPY (N/A ) Diagnosis:       (HISTORY ENDOMETRIAL OBLATION)      (IRREGULAR BLEEDING)    Surgeon:  Chi Kwon MD Responsible Provider:  RAVEN Gibbs CRNA    Anesthesia Type:  general ASA Status:  2          Anesthesia Type: general    Rose Phase I: Rose Score: 10    Rose Phase II:      Last vitals: Reviewed and per EMR flowsheets.        Anesthesia Post Evaluation    Patient location during evaluation: PACU  Patient participation: waiting for patient participation  Level of consciousness: responsive to physical stimuli  Pain score: 0  Airway patency: patent  Nausea & Vomiting: no nausea and no vomiting  Complications: no  Cardiovascular status: hemodynamically stable and blood pressure returned to baseline  Respiratory status: acceptable  Hydration status: stable

## 2020-10-13 NOTE — ANESTHESIA PRE PROCEDURE
Department of Anesthesiology  Preprocedure Note       Name:  Nan oLve   Age:  44 y.o.  :  1970                                          MRN:  [id number]         Date:  10/13/2020      Surgeon: Kathy Carbajal):  Naeem Rosenberg MD    Procedure: Procedure(s):  EXAMINATION UNDER ANESTHESIA  HYSTEROSCOPY, ENDOMETRIAL ABLATION WITH NOVASURE, DILATION AND CURETTAGE    Medications prior to admission:   Prior to Admission medications    Medication Sig Start Date End Date Taking? Authorizing Provider   sertraline (ZOLOFT) 100 MG tablet Take 100 mg by mouth daily    Historical Provider, MD   thyroid (ARMOUR) 60 MG tablet Take 90 mg by mouth daily Indications: Underactive Thyroid     Historical Provider, MD   ferrous gluconate (FERGON) 324 (38 Fe) MG tablet Take 324 mg by mouth daily (with breakfast)    Historical Provider, MD   VITAMIN D PO Take 5,000 Units by mouth daily Indications: vitamin D 3 5000u     Historical Provider, MD   DHEA 10 MG CAPS Take 1 capsule by mouth daily     Historical Provider, MD   hydroxychloroquine (PLAQUENIL) 200 MG tablet Take 200 mg by mouth 2 times daily     Historical Provider, MD   MAGNESIUM CARBONATE PO Take 1 capsule by mouth daily     Historical Provider, MD       Current medications:    No current facility-administered medications for this visit. No current outpatient medications on file. Facility-Administered Medications Ordered in Other Visits   Medication Dose Route Frequency Provider Last Rate Last Dose    phenazopyridine (PYRIDIUM) tablet 100 mg  100 mg Oral Once Naeem Rosenberg MD           Allergies: Allergies   Allergen Reactions    Other Rash     surgiprep (used on her when she had breast biopsy)    Pcn [Penicillins] Rash    Hibiclens [Chlorhexidine Gluconate] Rash     Possible; rash with surgical wash during breast surgery.         Problem List:    Patient Active Problem List   Diagnosis Code    Family history of breast cancer Z80.3    Family history of colon cancer Z80.0    Breast mass, right N63.10    S/P breast biopsy - right 2012 Z98.890    Diffuse cystic mastopathy N60.19    Menometrorrhagia N92.1       Past Medical History:        Diagnosis Date    Heavy menses     \"constant\"    Hypothyroidism     PONV (postoperative nausea and vomiting)     RA (rheumatoid arthritis) ([facility] [address]     ra       Past Surgical History:        Procedure Laterality Date    BREAST BIOPSY      R breast    COLONOSCOPY      DILATION AND CURETTAGE OF UTERUS N/A 6/19/2020    HYSTEROSCOPY, ENDOMETRIAL ABLATION WITH Prudence Arroyo, DILATION AND CURETTAGE performed [facility] [place]      had procedure done similiar to tubal that prevents her from having kids       Social History:    Social History     Tobacco Use    Smoking status: Never Smoker    Smokeless tobacco: Never Used   Substance Use Topics    Alcohol use: Never     Frequency: Never                                Counseling given: Not Answered      Vital Signs (Current): There were no vitals filed for this visit.                                            BP Readings from Last 3 Encounters:   10/13/20 117/71   10/05/20 110/72   08/17/20 120/62       NPO Status:                                                                                 BMI:   Wt Readings from Last 3 Encounters:   10/13/20 168 lb (76.2 kg)   10/09/20 168 lb (76.2 kg)   10/05/20 166 lb (75.3 kg)     There is no height or weight on file to calculate BMI.    CBC:   Lab Results   Component Value Date    WBC 5.1 06/05/2020    RBC 3.75 06/05/2020    HGB 11.8 06/05/2020    HCT 36.7 06/05/2020    MCV 97.9 06/05/2020    RDW 12.8 06/05/2020     06/05/2020       CMP: No results found for: NA, K, CL, CO2, BUN, CREATININE, GFRAA, AGRATIO, LABGLOM, GLUCOSE, PROT, CALCIUM, BILITOT, ALKPHOS, AST, ALT    POC Tests: No results for input(s): POCGLU, POCNA, POCK, POCCL, POCBUN, POCHEMO, POCHCT in the last 72 hours.    Coags: No results found for: PROTIME, INR, APTT    HCG (If Applicable): No results found for: PREGTESTUR, PREGSERUM, HCG, HCGQUANT     ABGs: No results found for: PHART, PO2ART, GFC9GWM, PSP9WLI, BEART, V3RWMNRQ     Type & Screen (If Applicable):  No results found for: LABABO, LABRH    Drug/Infectious Status (If Applicable):  No results found for: HIV, HEPCAB    COVID-19 Screening (If Applicable):   Lab Results   Component Value Date    COVID19 Not Detected 10/09/2020         Anesthesia Evaluation  Patient summary reviewed and Nursing notes reviewed   history of anesthetic complications: PONV. Airway: Mallampati: II  TM distance: >3 FB   Neck ROM: full  Mouth opening: > = 3 FB Dental: normal exam         Pulmonary:Negative Pulmonary ROS and normal exam  breath sounds clear to auscultation      (-) shortness of breath and not a current smoker          Patient did not smoke on day of surgery. Cardiovascular:        (-) hypertension, CAD,  angina and  CHF    NYHA Classification: I  ECG reviewed  Rhythm: regular  Rate: normal           Beta Blocker:  Not on Beta Blocker         Neuro/Psych:   Negative Neuro/Psych ROS     (-) seizures, CVA and depression/anxiety            GI/Hepatic/Renal: Neg GI/Hepatic/Renal ROS       (-) hiatal hernia, GERD, liver disease and no renal disease       Endo/Other: Negative Endo/Other ROS   (+) hypothyroidism, blood dyscrasia: anemia, arthritis: rheumatoid. , .    (-) diabetes mellitus        Pt had PAT visit. Abdominal:       Abdomen: soft. Vascular:                                          Anesthesia Plan      general     ASA 2     (Scop, emend, pepcid, reglan in preop  Decadron, zofran intraop  )  Induction: intravenous. BIS  MIPS: Postoperative opioids intended and Prophylactic antiemetics administered. Anesthetic plan and risks discussed with patient. Use of blood products discussed with patient whom. Plan discussed with CRNA.     Attending anesthesiologist reviewed and agrees with Quiana Artis MD   10/13/2020

## 2020-10-13 NOTE — OP NOTE
PREOPERATIVE DIAGNOSES  1. PMB  2. History of breast cancer  3. History of Tamoxifen use     POSTOPERATIVE DIAGNOSES  Same     PROCEDURES PERFORMED  1. Examination under anesthesia. 2.  Total laparoscopic hysterectomy. 3.  Bilateral salpingo-oophorectomy. 4.  Cystoscopy. ANESTHESIA  General.     ESTIMATED BLOOD LOSS  25 mL     IVF  1300 mL    UOP  161 mL    COMPLICATIONS  None. SPECIMENS  Uterus (145 grams)  with cervix and bilateral tubes and ovaries. FINDINGS  On bimanual exam there is a 6 weeks size, anteverted, mobile uterus. Normal tubes and left ovary. Right ovarian cyst.    DESCRIPTION OF PROCEDURE   The patient was taken to the operating room where she was placed under general anesthesia. She was positioned in dorsal lithotomy, prepped and draped in the usual sterile fashion. A speculum was then placed into the vagina, and the anterior lip of the cervix was grasped with a single-tooth tenaculum. The cervix was serially dilated to accommodate a medium V-Care Uterine Manipulator, which was placed without complication. Attention was then turned to the abdomen where a supraumbilical incision was made. Veress needle was placed. Placement  was confirmed with saline drop testand aspiration of air. Pneumoperitoneum was created. Two 8-mm ports were placed, 9 cm on each side of the umbilicus. An 8-mm assist port was placed in the LLQ. Bilateral infundibulopelvic ligaments were grasped, cauterized,and cut using the Vessel sealer. Next round ligaments were serially ligated bilaterally. Entrance was made into the anterior leaf of the broad ligament from each side to the midline and bladder flap was created. Bladder was displaced inferiorly. Bilateral uterine arteries were skeletonized,grasped, cauterized and cut using the Vessel sealer. Serial bites of the cardinal ligaments were taken until the cervix was appropriately exposed.   Colpotomy was made robotically beginning anteriorly and extended circumferentially about the  manipulator until the uterus was amputated. Uterus was removed. Vaginal cuff was repaired robotically with quill suture in a running fashion. The pelvis was copiously irrigated removing all clots and debris. Vistaseal was placed over the cuff and pedicles with excellent hemostasis noted. Cystoscopy was performed with bilateral ureteral flux of urine noted. At this time, the procedure was concluded. The pneumoperitoneum was deflated. All incisions were repaired with 4-0 monocryl. and Dermabond. At this time, the procedure was concluded. The patient was awakened in the operating room, taken to recovery in stable condition.

## 2020-10-13 NOTE — DISCHARGE INSTR - ACTIVITY
Learning About COVID-19 and Social Distancing  What is it? Social distancing means putting space between yourself and other people. The recommended distance is 6 feet, or about 2 meters. This also means staying away from any place where people may gather, such as vogt or other public gathering places. Why is it important? Social distancing is the best way to reduce the spread of COVID-19. This virus seems to spread from person to person through droplets from coughing and sneezing. So if you keep your distance from others, you're less likely to get it or spread it. And social distancing is important for everyone, not just those who are at high risk of infection, like older people. You might have the virus but not have symptoms. You could then give the infection to someone you come into contact with. How is it done? Putting 6 feet, or about 2 meters, between you and other people is the recommended distance. Also stay away from any place where people may gather, such as vogt or other public gathering places. So if possible:  · Work from home, and keep your kids at home. · Don't travel if you don't have to. And avoid public transportation, ride-shares, and taxis unless you have no choice. · Limit shopping to essentials, like food and medicines. · Wear a cloth face cover if you have to go to a public place like the grocery store or pharmacy. · Don't eat in restaurants. (You can still get takeout or food deliveries.)  · Avoid crowds and busy places. Follow stay-at-home orders or other directions for your area. Current as of: July 10, 2020               Content Version: 12.6  © 2006-2020 [employer]. Care instructions adapted under license by [facility] ([facility]). If you have questions about a medical condition or this instruction, always ask your healthcare professional. [address] any warranty or liability for your use of this information.

## 2020-10-26 ENCOUNTER — TELEMEDICINE (OUTPATIENT)
Dept: OBGYN | Age: 50
End: 2020-10-26

## 2020-10-26 PROCEDURE — 99024 POSTOP FOLLOW-UP VISIT: CPT | Performed by: OBSTETRICS & GYNECOLOGY

## 2020-10-26 ASSESSMENT — ENCOUNTER SYMPTOMS
GASTROINTESTINAL NEGATIVE: 1
EYES NEGATIVE: 1
RESPIRATORY NEGATIVE: 1

## 2020-10-26 NOTE — PROGRESS NOTES
10/26/2020    TELEHEALTH EVALUATION -- Audio/Visual (During CBPKG-78 public health emergency)    HPI:    Kenzie Mcdaniel (:  1970) has requested an audio/video evaluation for the following concern(s):    Pt presents today for her 2 week post op visit following a TLH, BSO on 10-13-20. Pt states her pain is getting better. Pt has had some slight spotting. Review of Systems   Constitutional: Negative. HENT: Negative. Eyes: Negative. Respiratory: Negative. Cardiovascular: Negative. Gastrointestinal: Negative. Genitourinary: Negative. Negative for dysuria, frequency, menstrual problem, pelvic pain, urgency and vaginal discharge. Skin: Negative. Neurological: Negative. Psychiatric/Behavioral: Negative.         Past Medical History:   Diagnosis Date    Heavy menses     \"constant\"    Hypothyroidism     PONV (postoperative nausea and vomiting)     RA (rheumatoid arthritis) ([facility] [address]     ra       Past Surgical History:   Procedure Laterality Date    BREAST BIOPSY      R breast    COLONOSCOPY      DILATION AND CURETTAGE OF UTERUS N/A 2020    HYSTEROSCOPY, ENDOMETRIAL ABLATION WITH Doretha Mila, DILATION AND CURETTAGE performed by Carlota Hobson MD at [facility] N/A 10/13/2020    EXAM UNDER ANESTHESIA [address]/ DAVINCI BILATERAL SALPINGO OOPHORECTOMY CYSTOSCOPY performed by Carlota Hobson MD at [address] [place] [address]      had procedure done similiar to tubal that prevents her from having kids       Family History   Problem Relation Age of Onset    Cancer Maternal Aunt         breast    Cancer Maternal Grandfather         colon    Cancer Paternal Grandmother         colon    Cancer Paternal Grandfather         colon    Cancer Other         paternal great aunt-breast    Breast Cancer Sister        Social History     Tobacco Use    Smoking status: Never Smoker    Smokeless tobacco: Never Used   Substance Use Topics    Alcohol use: Never Frequency: Never    Drug use: No       Prior to Visit Medications    Medication Sig Taking? Authorizing Provider   sertraline (ZOLOFT) 100 MG tablet Take 100 mg by mouth daily  Historical Provider, MD   ibuprofen (ADVIL;MOTRIN) 800 MG tablet Take 1 tablet by mouth every 8 hours as needed for Pain  Carlota Hobson MD   thyroid (ARMOUR) 60 MG tablet Take 90 mg by mouth daily Indications: Underactive Thyroid   Historical Provider, MD   ferrous gluconate (FERGON) 324 (38 Fe) MG tablet Take 324 mg by mouth daily (with breakfast)  Historical Provider, MD   VITAMIN D PO Take 5,000 Units by mouth daily Indications: vitamin D 3 5000u   Historical Provider, MD   DHEA 10 MG CAPS Take 1 capsule by mouth daily   Historical Provider, MD   hydroxychloroquine (PLAQUENIL) 200 MG tablet Take 200 mg by mouth 2 times daily   Historical Provider, MD   MAGNESIUM CARBONATE PO Take 1 capsule by mouth daily   Historical Provider, MD       Allergies   Allergen Reactions    Other Rash     surgiprep (used on her when she had breast biopsy)    Pcn [Penicillins] Rash    Hibiclens [Chlorhexidine Gluconate] Rash     Possible; rash with surgical wash during breast surgery. PHYSICAL EXAMINATION  Physical Exam  Constitutional:       General: She is not in acute distress. Appearance: Normal appearance. She is not ill-appearing or diaphoretic. HENT:      Head: Normocephalic and atraumatic. Nose: Nose normal. No rhinorrhea. Eyes:      General: No scleral icterus. Right eye: No discharge. Left eye: No discharge. Extraocular Movements: Extraocular movements intact. Pulmonary:      Effort: Pulmonary effort is normal. No respiratory distress. Musculoskeletal: Normal range of motion. Skin:     Coloration: Skin is not pale. Findings: No erythema or rash. Neurological:      Mental Status: She is alert and oriented to person, place, and time.    Psychiatric:         Attention and Perception: Attention and perception normal.         Mood and Affect: Mood and affect normal.         Speech: Speech normal.         Behavior: Behavior normal. Behavior is cooperative. Thought Content: Thought content normal.         Cognition and Memory: Cognition and memory normal.         Judgment: Judgment normal.           ASSESSMENT   Diagnosis Orders   1. Postop check     2. S/P laparoscopic hysterectomy         PLAN  1.  may gradually resume ADL's  2. Normal surgical pathology discussed  3. RTC 4 weeks  4. Jignesh Cuellar, am scribing for and in the presence of Dr. Latisha Hill. I, Dr. Latisha Hill, personally performed the services described in this documentation as scribed by Randa Rosenberg in my presence, and it is both accurate and complete. Consuela Sever is a 44 y.o. female being evaluated by a Virtual Visit (video visit) encounter to address concerns as mentioned above. A caregiver was present when appropriate. Due to this being a TeleHealth encounter (During [facility]- public health emergency), evaluation of the following organ systems was limited: Vitals/Constitutional/EENT/Resp/CV/GI//MS/Neuro/Skin/Heme-Lymph-Imm. Pursuant to the emergency declaration under the [address] [place] [address] authority and the [employer] Act, this Virtual Visit was conducted with patient's (and/or legal guardian's) consent, to reduce the patient's risk of exposure to COVID-19 and provide necessary medical care. The patient (and/or legal guardian) has also been advised to contact this office for worsening conditions or problems, and seek emergency medical treatment and/or call 911 if deemed necessary.      Patient identification was verified at the start of the visit: Yes    Total time spent on this encounter: Not billed by time    Services were provided through a video synchronous discussion virtually to substitute for in-person clinic visit. Patient and provider were located at their individual homes. --Ovidio Edwards RN on 10/26/2020 at 8:33 AM    An electronic signature was used to authenticate this note.

## 2020-11-19 ENCOUNTER — OFFICE VISIT (OUTPATIENT)
Dept: OBGYN | Age: 50
End: 2020-11-19
Payer: COMMERCIAL

## 2020-11-19 VITALS
DIASTOLIC BLOOD PRESSURE: 59 MMHG | BODY MASS INDEX: 23.91 KG/M2 | SYSTOLIC BLOOD PRESSURE: 113 MMHG | HEART RATE: 59 BPM | WEIGHT: 167 LBS | HEIGHT: 70 IN

## 2020-11-19 LAB — HPV COMMENT: NORMAL

## 2020-11-19 PROCEDURE — 99396 PREV VISIT EST AGE 40-64: CPT | Performed by: NURSE PRACTITIONER

## 2020-11-19 ASSESSMENT — ENCOUNTER SYMPTOMS
ALLERGIC/IMMUNOLOGIC NEGATIVE: 1
RESPIRATORY NEGATIVE: 1
CONSTIPATION: 0
GASTROINTESTINAL NEGATIVE: 1
EYES NEGATIVE: 1
DIARRHEA: 0

## 2020-11-19 NOTE — PATIENT INSTRUCTIONS
Patient Education        Breast Self-Exam: Care Instructions  Your Care Instructions     A breast self-exam is when you check your breasts for lumps or changes. This regular exam helps you learn how your breasts normally look and feel. Most breast problems or changes are not because of cancer. Breast self-exam is not a substitute for a mammogram. Having regular breast exams by your doctor and regular mammograms improve your chances of finding any problems with your breasts. Some women set a time each month to do a step-by-step breast self-exam. Other women like a less formal system. They might look at their breasts as they brush their teeth, or feel their breasts once in a while in the shower. If you notice a change in your breast, tell your doctor. Follow-up care is a key part of your treatment and safety. Be sure to make and go to all appointments, and call your doctor if you are having problems. It's also a good idea to know your test results and keep a list of the medicines you take. How do you do a breast self-exam?  · The best time to examine your breasts is usually one week after your menstrual period begins. Your breasts should not be tender then. If you do not have periods, you might do your exam on a day of the month that is easy to remember. · To examine your breasts:  ? Remove all your clothes above the waist and lie down. When you are lying down, your breast tissue spreads evenly over your chest wall, which makes it easier to feel all your breast tissue. ? Use the pads--not the fingertips--of the 3 middle fingers of your left hand to check your right breast. Move your fingers slowly in small coin-sized circles that overlap. ? Use three levels of pressure to feel of all your breast tissue. Use light pressure to feel the tissue close to the skin surface. Use medium pressure to feel a little deeper. Use firm pressure to feel your tissue close to your breastbone and ribs.  Use each pressure level to feel your breast tissue before moving on to the next spot. ? Check your entire breast, moving up and down as if following a strip from the collarbone to the bra line, and from the armpit to the ribs. Repeat until you have covered the entire breast.  ? Repeat this procedure for your left breast, using the pads of the 3 middle fingers of your right hand. · To examine your breasts while in the shower:  ? Place one arm over your head and lightly soap your breast on that side. ? Using the pads of your fingers, gently move your hand over your breast (in the strip pattern described above), feeling carefully for any lumps or changes. ? Repeat for the other breast.  · Have your doctor inspect anything you notice to see if you need further testing. Where can you learn more? Go to https://[employer]. org and sign in to your [employer] account. Enter P148 in the Duriana box to learn more about \"Breast Self-Exam: Care Instructions. \"     If you do not have an account, please click on the \"Sign Up Now\" link. Current as of: April 29, 2020               Content Version: 12.6  © 7225-1775 [employer]. Care instructions adapted under license by [facility] ([facility]). If you have questions about a medical condition or this instruction, always ask your healthcare professional. [address] any warranty or liability for your use of this information.

## 2020-11-19 NOTE — PROGRESS NOTES
Refugia Mode is a 44 y.o. female who presents today for her medical conditions/ complaints as noted below. Refugia Mode is c/o of Gynecologic Exam        HPI   Pt presents for annual exam. Pt is 5 weeks post-op TLH, BSO. Doing well, denies any bowel or bladder issues. Mammo:2020  Pap smear:2019  Contraception:Hysterectomy     P:3  Ab: Bone density:Not sure of yr   Colonoscopy:Not sure of yr   Patient's last menstrual period was 10/02/2020.       Past Medical History:   Diagnosis Date    Heavy menses     \"constant\"    Hypothyroidism     PONV (postoperative nausea and vomiting)     RA (rheumatoid arthritis) ([facility] [address]     ra     Past Surgical History:   Procedure Laterality Date    BREAST BIOPSY      R breast    COLONOSCOPY      DILATION AND CURETTAGE OF UTERUS N/A 2020    HYSTEROSCOPY, ENDOMETRIAL ABLATION WITH Tati Cam, DILATION AND CURETTAGE performed by Pricila Arias MD at [facility] N/A 10/13/2020    EXAM UNDER ANESTHESIA [address]/ DAVINCI BILATERAL SALPINGO OOPHORECTOMY CYSTOSCOPY performed by Pricila Arias MD at [address] [place] [address] Said      had procedure done similiar to tubal that prevents her from having kids     Family History   Problem Relation Age of Onset    Cancer Maternal Aunt         breast    Cancer Maternal Grandfather         colon    Cancer Paternal Grandmother         colon    Cancer Paternal Grandfather         colon    Cancer Other         paternal great aunt-breast    Breast Cancer Sister      Social History     Tobacco Use    Smoking status: Never Smoker    Smokeless tobacco: Never Used   Substance Use Topics    Alcohol use: Never     Frequency: Never       Current Outpatient Medications   Medication Sig Dispense Refill    Progesterone Micronized (PROGESTERONE PO) Take by mouth Indications: 100 mg      NONFORMULARY Indications: 80% estriol, 20% estradiol, .05% testosterone SH       sertraline (ZOLOFT) 100 MG tablet Take 100 mg by mouth daily      ibuprofen (ADVIL;MOTRIN) 800 MG tablet Take 1 tablet by mouth every 8 hours as needed for Pain 90 tablet 0    thyroid (ARMOUR) 60 MG tablet Take 90 mg by mouth daily Indications: Underactive Thyroid       ferrous gluconate (FERGON) 324 (38 Fe) MG tablet Take 324 mg by mouth daily (with breakfast)      VITAMIN D PO Take 5,000 Units by mouth daily Indications: vitamin D 3 5000u       DHEA 10 MG CAPS Take 1 capsule by mouth daily       hydroxychloroquine (PLAQUENIL) 200 MG tablet Take 200 mg by mouth 2 times daily       MAGNESIUM CARBONATE PO Take 1 capsule by mouth daily        No current facility-administered medications for this visit. Allergies   Allergen Reactions    Other Rash     surgiprep (used on her when she had breast biopsy)    Pcn [Penicillins] Rash    Hibiclens [Chlorhexidine Gluconate] Rash     Possible; rash with surgical wash during breast surgery. Vitals:    11/19/20 1301   BP: (!) 113/59   Pulse: 59     Body mass index is 23.96 kg/m². Review of Systems   Constitutional: Negative. HENT: Negative. Eyes: Negative. Respiratory: Negative. Cardiovascular: Negative. Gastrointestinal: Negative. Negative for constipation and diarrhea. Endocrine: Negative. Genitourinary: Negative. Negative for frequency, menstrual problem and urgency. Musculoskeletal: Negative. Skin: Positive for wound. Allergic/Immunologic: Negative. Neurological: Negative. Hematological: Negative. Psychiatric/Behavioral: Negative. All other systems reviewed and are negative. Physical Exam  Vitals signs and nursing note reviewed. Constitutional:       Appearance: She is well-developed. HENT:      Head: Normocephalic. Neck:      Musculoskeletal: Normal range of motion and neck supple. Thyroid: No thyroid mass or thyromegaly. Cardiovascular:      Rate and Rhythm: Normal rate and regular rhythm.    Pulmonary:      Effort: Pulmonary effort is normal.      Breath sounds: Normal breath sounds. Chest:      Breasts:         Right: No inverted nipple, mass, nipple discharge or skin change. Left: No inverted nipple, mass, nipple discharge or skin change. Comments: Diffusely fibrocystic bilaterally  Abdominal:      Palpations: Abdomen is soft. There is no mass. Tenderness: There is no abdominal tenderness. Genitourinary:     General: Normal vulva. Vagina: Bleeding (scant amount of brown discharge) present. Uterus: Absent. Adnexa:         Right: No mass or tenderness. Left: No mass or tenderness. Comments: Cervix and uterus surgically absent  Ovaries surgically absent  Vaginal cuff palpates smooth with sutures intact, no defect  Musculoskeletal: Normal range of motion. Skin:     General: Skin is warm and dry. Neurological:      Mental Status: She is alert and oriented to person, place, and time. Psychiatric:         Attention and Perception: Attention normal.         Mood and Affect: Mood normal.         Speech: Speech normal.         Behavior: Behavior normal.         Thought Content: Thought content normal.         Cognition and Memory: Cognition normal.         Judgment: Judgment normal.          Diagnosis Orders   1. Women's annual routine gynecological examination     2. Screening for vaginal cancer  Human papillomavirus (HPV) DNA probe thin prep high risk   3. Encounter for screening mammogram for malignant neoplasm of breast  TERA DIGITAL SCREEN W OR WO CAD BILATERAL       MEDICATIONS:  No orders of the defined types were placed in this encounter.       ORDERS:  Orders Placed This Encounter   Procedures    TERA DIGITAL SCREEN W OR WO CAD BILATERAL    Human papillomavirus (HPV) DNA probe thin prep high risk       PLAN:  Pap deferred  Ordered screening mammogram  F/u for post-op check and release to normal ADLs  Patient Instructions   Patient Education        Breast Self-Exam: Care Instructions  Your Care Instructions     A breast self-exam is when you check your breasts for lumps or changes. This regular exam helps you learn how your breasts normally look and feel. Most breast problems or changes are not because of cancer. Breast self-exam is not a substitute for a mammogram. Having regular breast exams by your doctor and regular mammograms improve your chances of finding any problems with your breasts. Some women set a time each month to do a step-by-step breast self-exam. Other women like a less formal system. They might look at their breasts as they brush their teeth, or feel their breasts once in a while in the shower. If you notice a change in your breast, tell your doctor. Follow-up care is a key part of your treatment and safety. Be sure to make and go to all appointments, and call your doctor if you are having problems. It's also a good idea to know your test results and keep a list of the medicines you take. How do you do a breast self-exam?  · The best time to examine your breasts is usually one week after your menstrual period begins. Your breasts should not be tender then. If you do not have periods, you might do your exam on a day of the month that is easy to remember. · To examine your breasts:  ? Remove all your clothes above the waist and lie down. When you are lying down, your breast tissue spreads evenly over your chest wall, which makes it easier to feel all your breast tissue. ? Use the pads--not the fingertips--of the 3 middle fingers of your left hand to check your right breast. Move your fingers slowly in small coin-sized circles that overlap. ? Use three levels of pressure to feel of all your breast tissue. Use light pressure to feel the tissue close to the skin surface. Use medium pressure to feel a little deeper. Use firm pressure to feel your tissue close to your breastbone and ribs.  Use each pressure level to feel your breast tissue before moving on to the next spot. ? Check your entire breast, moving up and down as if following a strip from the collarbone to the bra line, and from the armpit to the ribs. Repeat until you have covered the entire breast.  ? Repeat this procedure for your left breast, using the pads of the 3 middle fingers of your right hand. · To examine your breasts while in the shower:  ? Place one arm over your head and lightly soap your breast on that side. ? Using the pads of your fingers, gently move your hand over your breast (in the strip pattern described above), feeling carefully for any lumps or changes. ? Repeat for the other breast.  · Have your doctor inspect anything you notice to see if you need further testing. Where can you learn more? Go to [email or web address][employer]. org and sign in to your [employer] account. Enter P148 in the [employer] box to learn more about \"Breast Self-Exam: Care Instructions. \"     If you do not have an account, please click on the \"Sign Up Now\" link. Current as of: April 29, 2020               Content Version: 12.6  © 1527-9473 [employer]. Care instructions adapted under license by [facility] ([facility]). If you have questions about a medical condition or this instruction, always ask your healthcare professional. [address]  any warranty or liability for your use of this information.

## 2020-11-19 NOTE — PROGRESS NOTES
Pt presents today for pap smear and breast exam.  Pt complains of     Mammo:2020  Pap [id number]  Contraception:Hysterectomy     P:3  Ab:   Bone density:Not sure of yr   Colonoscopy:Not sure of yr

## 2020-11-30 ENCOUNTER — OFFICE VISIT (OUTPATIENT)
Dept: OBGYN | Age: 50
End: 2020-11-30

## 2020-11-30 VITALS
DIASTOLIC BLOOD PRESSURE: 62 MMHG | HEIGHT: 70 IN | WEIGHT: 168 LBS | HEART RATE: 63 BPM | SYSTOLIC BLOOD PRESSURE: 123 MMHG | BODY MASS INDEX: 24.05 KG/M2

## 2020-11-30 PROCEDURE — 99024 POSTOP FOLLOW-UP VISIT: CPT | Performed by: OBSTETRICS & GYNECOLOGY

## 2020-11-30 ASSESSMENT — ENCOUNTER SYMPTOMS
GASTROINTESTINAL NEGATIVE: 1
EYES NEGATIVE: 1
RESPIRATORY NEGATIVE: 1

## 2020-11-30 NOTE — PROGRESS NOTES
Postoperative Follow-up: Patient presents for 6 week follow-up post TLH w Davinci, BSO, CYSTOSCOPY for Irregular periods, Endometrial Oblation . Eating a regular diet without difficulty. Bowel movements are Normal.  The patient is not having any pain.

## 2020-11-30 NOTE — PROGRESS NOTES
Juany Gibbs is a 44 y.o.  who presents today for her 6 week post op visit following a TLH with Davinci, bilateral salpingo oophorectomy, cystoscopy on 10-13-20. Review of Systems   Constitutional: Negative. HENT: Negative. Eyes: Negative. Respiratory: Negative. Cardiovascular: Negative. Gastrointestinal: Negative. Endocrine: Negative. Genitourinary: Negative. Negative for dysuria, frequency, menstrual problem, pelvic pain, urgency and vaginal discharge. Musculoskeletal: Negative. Skin: Negative. Allergic/Immunologic: Negative. Neurological: Negative. Hematological: Negative. Psychiatric/Behavioral: Negative.         Past Medical History:   Diagnosis Date    Heavy menses     \"constant\"    Hypothyroidism     PONV (postoperative nausea and vomiting)     RA (rheumatoid arthritis) ([facility] [address]     ra       Past Surgical History:   Procedure Laterality Date    BREAST BIOPSY      R breast    COLONOSCOPY      DILATION AND CURETTAGE OF UTERUS N/A 6/19/2020    HYSTEROSCOPY, ENDOMETRIAL ABLATION WITH Sushil Echeverria, DILATION AND CURETTAGE performed by Juan C Horowitz MD at [address] [place] N/A 10/13/2020    EXAM UNDER ANESTHESIA [address]/ DAVINCI BILATERAL SALPINGO OOPHORECTOMY CYSTOSCOPY performed by Juan C Horowitz MD at [address] [place]      had procedure done similiar to tubal that prevents her from having kids       Family History   Problem Relation Age of Onset    Cancer Maternal Aunt         breast    Cancer Maternal Grandfather         colon    Cancer Paternal Grandmother         colon    Cancer Paternal Grandfather         colon    Cancer Other         paternal great aunt-breast    Breast Cancer Sister        Social History     Socioeconomic History    Marital status:      Spouse name: Not on file    Number of children: Not on file    Years of education: Not on file    Highest education level: Not on file   Occupational History    Not on file   Social Needs    Financial resource strain: Not on file    Food insecurity     Worry: Not on file     Inability: Not on file    Transportation needs     Medical: Not on file     Non-medical: Not on file   Tobacco Use    Smoking status: Never Smoker    Smokeless tobacco: Never Used   Substance and Sexual Activity    Alcohol use: Never     Frequency: Never    Drug use: No    Sexual activity: Not on file   Lifestyle    Physical activity     Days per week: Not on file     Minutes per session: Not on file    Stress: Not on file   Relationships    Social connections     Talks on phone: Not on file     Gets together: Not on file     Attends Confucianist service: Not on file     Active member of club or organization: Not on file     Attends meetings of clubs or organizations: Not on file     Relationship status: Not on file    Intimate partner violence     Fear of current or ex partner: Not on file     Emotionally abused: Not on file     Physically abused: Not on file     Forced sexual activity: Not on file   Other Topics Concern    Not on file   Social History Narrative    Not on file         Current Outpatient Medications:     Progesterone Micronized (PROGESTERONE PO), Take by mouth Indications: 100 mg, Disp: , Rfl:     NONFORMULARY, Indications: 80% estriol, 20% estradiol, .05% testosterone SH , Disp: , Rfl:     sertraline (ZOLOFT) 100 MG tablet, Take 100 mg by mouth daily, Disp: , Rfl:     ibuprofen (ADVIL;MOTRIN) 800 MG tablet, Take 1 tablet by mouth every 8 hours as needed for Pain, Disp: 90 tablet, Rfl: 0    thyroid (ARMOUR) 60 MG tablet, Take 90 mg by mouth daily Indications: Underactive Thyroid , Disp: , Rfl:     ferrous gluconate (FERGON) 324 (38 Fe) MG tablet, Take 324 mg by mouth daily (with breakfast), Disp: , Rfl:     VITAMIN D PO, Take 5,000 Units by mouth daily Indications: vitamin D 3 5000u , Disp: , Rfl:     DHEA 10 MG CAPS, Take 1 capsule by mouth daily , Disp: , Rfl:     hydroxychloroquine (PLAQUENIL) 200 MG tablet, Take 200 mg by mouth 2 times daily , Disp: , Rfl:     MAGNESIUM CARBONATE PO, Take 1 capsule by mouth daily , Disp: , Rfl:     Allergies   Allergen Reactions    Other Rash     surgiprep (used on her when she had breast biopsy)    Pcn [Penicillins] Rash    Hibiclens [Chlorhexidine Gluconate] Rash     Possible; rash with surgical wash during breast surgery. /62   Pulse 63   Ht 5' 10\" (1.778 m)   Wt 168 lb (76.2 kg)   LMP 10/02/2020   BMI 24.11 kg/m²   Physical Exam  Constitutional:       General: She is not in acute distress. Appearance: She is well-developed. She is not diaphoretic. HENT:      Head: Normocephalic and atraumatic. Hair is normal.      Right Ear: Hearing and external ear normal. No decreased hearing noted. Left Ear: Hearing and external ear normal. No decreased hearing noted. Eyes:      General: No scleral icterus. Conjunctiva/sclera: Conjunctivae normal.      Pupils: Pupils are equal, round, and reactive to light. Neck:      Musculoskeletal: Normal range of motion and neck supple. Pulmonary:      Effort: Pulmonary effort is normal. No respiratory distress. Abdominal:      General: There is no distension. Palpations: Abdomen is soft. There is no mass. Tenderness: There is no abdominal tenderness. There is no guarding or rebound. Genitourinary:     Labia:         Right: No rash, tenderness or lesion. Left: No rash, tenderness or lesion. Vagina: Normal.      Cervix: No cervical motion tenderness, discharge or friability. Adnexa:         Right: No mass, tenderness or fullness. Left: No mass, tenderness or fullness. Rectum: Normal.      Comments: Vaginal cuff healing well  Musculoskeletal: Normal range of motion. General: No tenderness or deformity. Skin:     General: Skin is warm and dry. Coloration: Skin is not pale.       Findings: No erythema or rash. Neurological:      Mental Status: She is alert and oriented to person, place, and time. Cranial Nerves: No cranial nerve deficit. Psychiatric:         Speech: Speech normal.         Behavior: Behavior normal.         Thought Content: Thought content normal.         Judgment: Judgment normal.               Assessment   Diagnosis Orders   1. Postop check     2. S/P laparoscopic hysterectomy         Plan     1. May resume ADL's, may resume intercourse at 8 weeks post op  2. RTC one year or prn. Rosi Eldridge, am scribing for and in the presence of Dr. Robert Rios. I, Dr. Robert Rios, personally performed the services described in this documentation as scribed by Cory Morton in my presence, and it is both accurate and complete.

## 2020-12-01 ASSESSMENT — ENCOUNTER SYMPTOMS: ALLERGIC/IMMUNOLOGIC NEGATIVE: 1

## 2021-01-07 ENCOUNTER — OFFICE VISIT (OUTPATIENT)
Dept: OBGYN CLINIC | Age: 51
End: 2021-01-07

## 2021-01-07 VITALS
WEIGHT: 163 LBS | SYSTOLIC BLOOD PRESSURE: 130 MMHG | BODY MASS INDEX: 23.34 KG/M2 | HEIGHT: 70 IN | DIASTOLIC BLOOD PRESSURE: 72 MMHG

## 2021-01-07 DIAGNOSIS — Z90.710 S/P LAPAROSCOPIC HYSTERECTOMY: ICD-10-CM

## 2021-01-07 DIAGNOSIS — N95.2 VAGINAL ATROPHY: ICD-10-CM

## 2021-01-07 DIAGNOSIS — N53.12 DYSPAREUNIA, MALE: Primary | ICD-10-CM

## 2021-01-07 PROCEDURE — 99024 POSTOP FOLLOW-UP VISIT: CPT | Performed by: OBSTETRICS & GYNECOLOGY

## 2021-01-07 ASSESSMENT — ENCOUNTER SYMPTOMS
RESPIRATORY NEGATIVE: 1
EYES NEGATIVE: 1
GASTROINTESTINAL NEGATIVE: 1
ALLERGIC/IMMUNOLOGIC NEGATIVE: 1

## 2021-01-07 NOTE — PROGRESS NOTES
 Number of children: Not on file    Years of education: Not on file    Highest education level: Not on file   Occupational History    Not on file   Social Needs    Financial resource strain: Not on file    Food insecurity     Worry: Not on file     Inability: Not on file    Transportation needs     Medical: Not on file     Non-medical: Not on file   Tobacco Use    Smoking status: Never Smoker    Smokeless tobacco: Never Used   Substance and Sexual Activity    Alcohol use: Never     Frequency: Never    Drug use: No    Sexual activity: Not on file   Lifestyle    Physical activity     Days per week: Not on file     Minutes per session: Not on file    Stress: Not on file   Relationships    Social connections     Talks on phone: Not on file     Gets together: Not on file     Attends Sikh service: Not on file     Active member of club or organization: Not on file     Attends meetings of clubs or organizations: Not on file     Relationship status: Not on file    Intimate partner violence     Fear of current or ex partner: Not on file     Emotionally abused: Not on file     Physically abused: Not on file     Forced sexual activity: Not on file   Other Topics Concern    Not on file   Social History Narrative    Not on file         Current Outpatient Medications:     Progesterone Micronized (PROGESTERONE PO), Take by mouth Indications: 100 mg, Disp: , Rfl:     NONFORMULARY, Indications: 80% estriol, 20% estradiol, .05% testosterone SH , Disp: , Rfl:     sertraline (ZOLOFT) 100 MG tablet, Take 100 mg by mouth daily, Disp: , Rfl:     ibuprofen (ADVIL;MOTRIN) 800 MG tablet, Take 1 tablet by mouth every 8 hours as needed for Pain, Disp: 90 tablet, Rfl: 0    thyroid (ARMOUR) 60 MG tablet, Take 90 mg by mouth daily Indications: Underactive Thyroid , Disp: , Rfl:     ferrous gluconate (FERGON) 324 (38 Fe) MG tablet, Take 324 mg by mouth daily (with breakfast), Disp: , Rfl:     VITAMIN D PO, Take 5,000 Units by mouth daily Indications: vitamin D 3 5000u , Disp: , Rfl:     DHEA 10 MG CAPS, Take 1 capsule by mouth daily , Disp: , Rfl:     hydroxychloroquine (PLAQUENIL) 200 MG tablet, Take 200 mg by mouth 2 times daily , Disp: , Rfl:     MAGNESIUM CARBONATE PO, Take 1 capsule by mouth daily , Disp: , Rfl:     Allergies   Allergen Reactions    Other Rash     surgiprep (used on her when she had breast biopsy)    Pcn [Penicillins] Rash    Hibiclens [Chlorhexidine Gluconate] Rash     Possible; rash with surgical wash during breast surgery. /72   Ht 5' 10\" (1.778 m)   Wt 163 lb (73.9 kg)   LMP 10/02/2020   BMI 23.39 kg/m²   Physical Exam  Constitutional:       General: She is not in acute distress. Appearance: She is well-developed. She is not diaphoretic. HENT:      Head: Normocephalic and atraumatic. Hair is normal.      Right Ear: Hearing and external ear normal. No decreased hearing noted. Left Ear: Hearing and external ear normal. No decreased hearing noted. Eyes:      General: No scleral icterus. Conjunctiva/sclera: Conjunctivae normal.      Pupils: Pupils are equal, round, and reactive to light. Neck:      Musculoskeletal: Normal range of motion. Pulmonary:      Effort: Pulmonary effort is normal. No respiratory distress. Abdominal:      General: There is no distension. Palpations: Abdomen is soft. There is no mass. Tenderness: There is no abdominal tenderness. There is no guarding or rebound. Genitourinary:     Labia:         Right: No rash, tenderness or lesion. Left: No rash, tenderness or lesion. Vagina: Normal.      Cervix: No cervical motion tenderness, discharge or friability. Adnexa:         Right: No mass, tenderness or fullness. Left: No mass, tenderness or fullness. Rectum: Normal.      Comments: Multple areas where sutures are broken.   These sutures removed easily from vaginal cuff  Vaginal cuff well healed  Musculoskeletal: Normal range of motion. General: No tenderness or deformity. Skin:     General: Skin is warm and dry. Coloration: Skin is not pale. Findings: No erythema or rash. Neurological:      Mental Status: She is alert and oriented to person, place, and time. Cranial Nerves: No cranial nerve deficit. Psychiatric:         Speech: Speech normal.         Behavior: Behavior normal.         Thought Content: Thought content normal.         Judgment: Judgment normal.               Assessment   Diagnosis Orders   1. Dyspareunia, male     2. S/P laparoscopic hysterectomy     3. Vaginal atrophy         Plan     1. Sutures removed from vaginal cuff  2. Vaginal estrogen every day for one week then twice weekly for one month. Sample given  3. RTC     I Odette Aase, am scribing for and in the presence of Dr. Denilson Rhoades. I, Dr. Denilson Rhoades, personally performed the services described in this documentation as scribed by Odette Aase in my presence, and it is both accurate and complete.

## 2021-06-01 NOTE — H&P (VIEW-ONLY)
"Chief Complaint:   Chief Complaint   Patient presents with   • Diarrhea     Pt c/o \"lots\" of diarrhea since Feb/March-states it just keep getting worse-is having multiple loose BM's/day; Pt states she has eliminated gluten, dairy, eggs, and multiple other things from her diet but that hasn't helped    • Nausea     Pt also c/o some nausea occasionally          Patient ID: Yusra Fisher is a 51 y.o. female     History of Present Illness: This is a very pleasant 51-year-old female who is here today with complaints of diarrhea and nausea.    The patient states that she has been having diarrhea since around February and March.  She states \"it just keeps getting worse with multiple loose stools a day.\"  She states that she has seen black tarry stools but is on iron supplement.  She states that she eliminated gluten, dairy, eggs and multiple other things but that has not seemed to help.  She states on occasion she has had associated nausea.  The patient was referred by SARA Goldman with [facility] for complaints of chronic diarrhea.  Gastrointestinal panel along with C. difficile stools were ordered however I do not have those results.  The patient's last colonoscopy was performed on 1/29/2016 found to be normal with recall set for 10 years.  Is a family history of colon cancer.  The patient is on multiple over-the-counter supplements which we are unable to find noted in the system to place under her med list.  She denies any previous antibiotic treatment.  She states she recently started a probiotic with prebiotic.  She states she takes \"grass fed spleen and grass fed collagen.\"  She states that she went to [facility] and underwent a Zytoscan and was told she had \"leaky gut.\" she tells me that stools were turned into Dr. Robin's office yesterday for pathogens including C. difficile.    The patient denies any vomiting, epigastric pain, dysphagia, pyrosis or hematemesis.  The patient denies any " fever or chills.  Denies any hematochezia.  Denies any unintentional weight loss or loss of appetite.      Past Medical History:   Diagnosis Date   • Arthritis    • Family history of colon cancer    • Hypertension    • Hypothyroidism    • Migraines    • Pernicious anemia 2021   • RA (rheumatoid arthritis) (CMS/HCC)        Past Surgical History:   Procedure Laterality Date   • ABDOMINOPLASTY     • COLONOSCOPY  01/29/2016    The entire examined colon is normal on direct and retroflexion views; No specimens collected; Repeat 10 years   • COLONOSCOPY  01/13/2012    Preparation of the colon was fair; The examined portion of the ileum was normal; The entire examined colon is normal on direct and retroflexion views; Repeat 4 years   • COLONOSCOPY  11/30/2007    Normal colonoscopy; Repeat 4 years   • DILATATION AND CURETTAGE     • ESSURE TUBAL LIGATION     • HYSTERECTOMY  10/13/2020         Current Outpatient Medications:   •  Ascorbic Acid (Vitamin C) 500 MG capsule, Take 1 capsule by mouth Daily., Disp: , Rfl:   •  Bacillus Coagulans-Inulin (PROBIOTIC-PREBIOTIC PO), Take 1 capsule by mouth Daily., Disp: , Rfl:   •  Cyanocobalamin (Vitamin B-12) 1000 MCG sublingual tablet, Place 2 tablets under the tongue Daily., Disp: , Rfl:   •  DHEA 10 MG capsule, Take 1 capsule by mouth Daily., Disp: , Rfl:   •  Evening Primrose Oil 1000 MG capsule, Take 1 capsule by mouth Daily., Disp: , Rfl:   •  folic acid (FOLVITE) 1 MG tablet, Take 5 mg by mouth Daily., Disp: , Rfl:   •  Hormone Cream Base cream, Place 1 application on the skin as directed by provider Daily. 80% Estriol/20% Estradiol/0.5 testosterone Compounded at [facility], Disp: , Rfl:   •  hydroxychloroquine (PLAQUENIL) 200 MG tablet, Take 200 mg by mouth 2 (Two) Times a Day., Disp: , Rfl:   •  IRON CR PO, Take 85 mg by mouth 2 (two) times a day., Disp: , Rfl:   •  Magnesium 500 MG tablet, Take 1 tablet by mouth 2 (two) times a day., Disp: , Rfl:   •  MAGNESIUM PO, Take  "500 mg by mouth 2 (two) times a day., Disp: , Rfl:   •  Progesterone (PROMETRIUM) 100 MG capsule, Take 1 capsule by mouth Daily., Disp: , Rfl:   •  sertraline (ZOLOFT) 25 MG tablet, Take 25 mg by mouth Daily., Disp: , Rfl:   •  Thyroid 60 MG PO tablet, Take 90 mg by mouth Daily., Disp: , Rfl:   •  Vitamin D-Vitamin K (Vitamin K2-Vitamin D3)  MCG-UNIT capsule, Take 1 capsule by mouth Daily., Disp: , Rfl:   •  Zinc 50 MG capsule, Take 1 capsule by mouth Daily., Disp: , Rfl:   •  Sodium Sulfate-Mag Sulfate-KCl 5820-011-967 MG tablet, Take 12 tablets by mouth Take As Directed., Disp: 24 tablet, Rfl: 0    Allergies   Allergen Reactions   • Penicillins Rash   • Chlorhexidine Gluconate Rash     Possible; rash with surgical wash during breast surgery.        Social History     Socioeconomic History   • Marital status:      Spouse name: Not on file   • Number of children: Not on file   • Years of education: Not on file   • Highest education level: Not on file   Tobacco Use   • Smoking status: Never Smoker   • Smokeless tobacco: Never Used   Vaping Use   • Vaping Use: Never used   Substance and Sexual Activity   • Alcohol use: Not Currently   • Drug use: No   • Sexual activity: Yes     Partners: Male     Birth control/protection: Surgical     Comment: Essure       Family History   Problem Relation Age of Onset   • Diabetes Father    • Colon cancer Maternal Grandfather         In his 60's or 70's   • Liver cancer Neg Hx    • Liver disease Neg Hx    • Stomach cancer Neg Hx    • Colon polyps Neg Hx    • Rectal cancer Neg Hx        Vitals:    06/02/21 0855   BP: 110/70   BP Location: Left arm   Patient Position: Sitting   Cuff Size: Adult   Pulse: 60   Temp: 96.8 °F (36 °C)   TempSrc: Infrared   SpO2: 100%   Weight: 73.5 kg (162 lb)   Height: 177.8 cm (70\")       Review of Systems:    General:    Present -feeling well   Skin:    Not Present-Rash   HEENT:     Not Present-Acute visual changes or Acute hearing changes "   Neck :    Not Present- swollen glands   Genitourinary:      Not Present- burning, frequency, urgency hematuria, dysuria,   Cardiovascular:   Not Present-chest pain, palpitations, or pressure   Respiratory:   Not Present- shortness of breath or cough   Gastrointestinal:  Musculoskeletal:  Neurological:  Psychiatric:   Present as mentioned in the HP    Not Present. Recent gait disturbances.    Not Present-Seizures and weakness in extremities.    Not Present- Anxiety or Depression.       Physical Exam:    General Appearance:    Alert, cooperative, in no acute distress   Psych:    Mood appropriate    Eyes:          conjunctivae and sclerae normal, no   icterus, no pallor   ENMT:    Ears appear intact with no abnormalities noted oral mucosa moist   Neck:   No adenopathy, supple, trachea midline, no thyromegaly, no   carotid bruit, no JVD    Cardiovascular:    Regular rhythm and normal rate, normal S1 and S2, no            murmur, no gallop, no rub, no click   Gastrointestinal:     Inspection normal.  Normal bowel sounds, no masses, no organomegaly, soft round non-tender, non-distended, no guarding, no rebound or tenderness. No hepatosplenomegaly.   Skin:   No bleeding, bruising or rash   Neurologic:   nonfocal         Lab Results - Last 18 Months   Lab Units 06/05/20  0950   HEMOGLOBIN g/dL 11.8*   HEMATOCRIT % 36.7*   MCV fL 97.9   WBC K/uL 5.1   RDW % 12.8   MPV fL 11.0   PLATELETS K/uL 246       Assessment and Plan:  Assessment/Plan   Diagnoses and all orders for this visit:    1. Diarrhea, unspecified type (Primary)  -     Case Request; Standing  -     Case Request  -     CBC & Differential; Future  -     Comprehensive Metabolic Panel; Future    2. Family history of colon cancer  -     Case Request; Standing  -     Case Request    3. Black stools  -     Case Request; Standing  -     Case Request  -     CBC & Differential; Future  -     Comprehensive Metabolic Panel; Future    4. Nausea  -     Case Request;  Standing  -     Case Request    Other orders  -     Follow Anesthesia Guidelines / Protocol; Future  -     Obtain Informed Consent; Future  -     Implement Anesthesia Orders Day of Procedure; Standing  -     Obtain Informed Consent; Standing  -     Verify bowel prep was successful; Standing  -     Sodium Sulfate-Mag Sulfate-KCl 0231-105-798 MG tablet; Take 12 tablets by mouth Take As Directed.  Dispense: 24 tablet; Refill: 0    Will schedule patient for both EGD and colonoscopy.  We have requested results of gastrointestinal panel for pathogens as well as C. difficile from Dr. Robin's office and will await those results.     There are no Patient Instructions on file for this visit.    Next follow-up appointment      The risks, benefits, and alternatives of endoscopy were reviewed with the patient today.  Risks including perforation, with or without dilation, possibly requiring surgery.  Additional risks include risk of bleeding.  There is also the risk of a drug reaction or problems with anesthesia.  This will be discussed with the further by the anesthesia team on the day of the procedure. The benefits include the diagnosis and management of disease of the upper digestive tract.  Alternatives to endoscopy include upper GI series, laboratory testing, radiographic evaluation, or no intervention.  The patient verbalizes understanding and agrees.      The risks, benefits, and alternatives of colonoscopy were reviewed with the patient today.  Risks including perforation of the colon possibly requiring surgery or colostomy.  Additional risks include risk of bleeding from biopsies or removal of colon tissue.  There is also the risk of a drug reaction or problems with anesthesia.  This will be discussed with the further by the anesthesia team on the day of the procedure.  Lastly there is a possibility of missing a colon polyp or cancer.  The benefits include the diagnosis and management of disease of the colon and  rectum.  Alternatives to colonoscopy include barium enema, laboratory testing, radiographic evaluation, or no intervention.  The patient verbalizes understanding and agrees.      EMR Dragon/Transcription disclaimer:  Much of this encounter note is an electronic transcription/translation of spoken language to printed text. The electronic translation of spoken language may permit erroneous, or at times, nonsensical words or phrases to be inadvertently transcribed; although I have reviewed the note for such errors, some may still exist.

## 2021-06-02 ENCOUNTER — LAB (OUTPATIENT)
Dept: LAB | Facility: HOSPITAL | Age: 51
End: 2021-06-02

## 2021-06-02 ENCOUNTER — OFFICE VISIT (OUTPATIENT)
Dept: GASTROENTEROLOGY | Facility: CLINIC | Age: 51
End: 2021-06-02

## 2021-06-02 VITALS
HEART RATE: 60 BPM | BODY MASS INDEX: 23.19 KG/M2 | HEIGHT: 70 IN | DIASTOLIC BLOOD PRESSURE: 70 MMHG | WEIGHT: 162 LBS | OXYGEN SATURATION: 100 % | TEMPERATURE: 96.8 F | SYSTOLIC BLOOD PRESSURE: 110 MMHG

## 2021-06-02 DIAGNOSIS — Z80.0 FAMILY HISTORY OF COLON CANCER: ICD-10-CM

## 2021-06-02 DIAGNOSIS — K92.1 BLACK STOOLS: ICD-10-CM

## 2021-06-02 DIAGNOSIS — R19.7 DIARRHEA, UNSPECIFIED TYPE: ICD-10-CM

## 2021-06-02 DIAGNOSIS — R11.0 NAUSEA: ICD-10-CM

## 2021-06-02 DIAGNOSIS — R19.7 DIARRHEA, UNSPECIFIED TYPE: Primary | ICD-10-CM

## 2021-06-02 LAB
ALBUMIN SERPL-MCNC: 4.9 G/DL (ref 3.5–5.2)
ALBUMIN/GLOB SERPL: 1.7 G/DL
ALP SERPL-CCNC: 37 U/L (ref 39–117)
ALT SERPL W P-5'-P-CCNC: 33 U/L (ref 1–33)
ANION GAP SERPL CALCULATED.3IONS-SCNC: 10 MMOL/L (ref 5–15)
AST SERPL-CCNC: 30 U/L (ref 1–32)
BILIRUB SERPL-MCNC: 0.6 MG/DL (ref 0–1.2)
BUN SERPL-MCNC: 19 MG/DL (ref 6–20)
BUN/CREAT SERPL: 26.4 (ref 7–25)
CALCIUM SPEC-SCNC: 9.3 MG/DL (ref 8.6–10.5)
CHLORIDE SERPL-SCNC: 102 MMOL/L (ref 98–107)
CO2 SERPL-SCNC: 26 MMOL/L (ref 22–29)
CREAT SERPL-MCNC: 0.72 MG/DL (ref 0.57–1)
GFR SERPL CREATININE-BSD FRML MDRD: 85 ML/MIN/1.73
GLOBULIN UR ELPH-MCNC: 2.9 GM/DL
GLUCOSE SERPL-MCNC: 91 MG/DL (ref 65–99)
POTASSIUM SERPL-SCNC: 4.6 MMOL/L (ref 3.5–5.2)
PROT SERPL-MCNC: 7.8 G/DL (ref 6–8.5)
SODIUM SERPL-SCNC: 138 MMOL/L (ref 136–145)

## 2021-06-02 PROCEDURE — 99204 OFFICE O/P NEW MOD 45 MIN: CPT | Performed by: NURSE PRACTITIONER

## 2021-06-02 PROCEDURE — 80053 COMPREHEN METABOLIC PANEL: CPT

## 2021-06-02 PROCEDURE — 36415 COLL VENOUS BLD VENIPUNCTURE: CPT

## 2021-06-02 RX ORDER — PROGESTERONE 100 MG/1
1 CAPSULE ORAL DAILY
COMMUNITY
Start: 2021-05-09

## 2021-06-02 RX ORDER — MAGNESIUM 200 MG
2 TABLET ORAL DAILY
COMMUNITY

## 2021-06-02 RX ORDER — MULTIVIT-MIN/IRON/FOLIC ACID/K 18-600-40
1 CAPSULE ORAL DAILY
COMMUNITY

## 2021-06-02 RX ORDER — FOLIC ACID 1 MG/1
5 TABLET ORAL DAILY
Status: ON HOLD | COMMUNITY
End: 2021-10-22

## 2021-06-02 RX ORDER — THIAMINE HCL 100 MG
1 TABLET ORAL 2 TIMES DAILY
COMMUNITY

## 2021-06-02 RX ORDER — LEVOTHYROXINE AND LIOTHYRONINE 38; 9 UG/1; UG/1
90 TABLET ORAL DAILY
COMMUNITY

## 2021-06-03 ENCOUNTER — LAB (OUTPATIENT)
Dept: LAB | Facility: HOSPITAL | Age: 51
End: 2021-06-03

## 2021-06-03 DIAGNOSIS — R19.7 DIARRHEA, UNSPECIFIED TYPE: ICD-10-CM

## 2021-06-03 DIAGNOSIS — K92.1 BLACK STOOLS: ICD-10-CM

## 2021-06-03 LAB
BASOPHILS # BLD AUTO: 0.03 10*3/MM3 (ref 0–0.2)
BASOPHILS NFR BLD AUTO: 0.4 % (ref 0–1.5)
DEPRECATED RDW RBC AUTO: 45.1 FL (ref 37–54)
EOSINOPHIL # BLD AUTO: 0.24 10*3/MM3 (ref 0–0.4)
EOSINOPHIL NFR BLD AUTO: 3.4 % (ref 0.3–6.2)
ERYTHROCYTE [DISTWIDTH] IN BLOOD BY AUTOMATED COUNT: 12.7 % (ref 12.3–15.4)
HCT VFR BLD AUTO: 40.5 % (ref 34–46.6)
HGB BLD-MCNC: 13.3 G/DL (ref 12–15.9)
IMM GRANULOCYTES # BLD AUTO: 0.02 10*3/MM3 (ref 0–0.05)
IMM GRANULOCYTES NFR BLD AUTO: 0.3 % (ref 0–0.5)
LYMPHOCYTES # BLD AUTO: 1.69 10*3/MM3 (ref 0.7–3.1)
LYMPHOCYTES NFR BLD AUTO: 24.1 % (ref 19.6–45.3)
MCH RBC QN AUTO: 31.7 PG (ref 26.6–33)
MCHC RBC AUTO-ENTMCNC: 32.8 G/DL (ref 31.5–35.7)
MCV RBC AUTO: 96.7 FL (ref 79–97)
MONOCYTES # BLD AUTO: 0.43 10*3/MM3 (ref 0.1–0.9)
MONOCYTES NFR BLD AUTO: 6.1 % (ref 5–12)
NEUTROPHILS NFR BLD AUTO: 4.59 10*3/MM3 (ref 1.7–7)
NEUTROPHILS NFR BLD AUTO: 65.7 % (ref 42.7–76)
NRBC BLD AUTO-RTO: 0 /100 WBC (ref 0–0.2)
PLATELET # BLD AUTO: 194 10*3/MM3 (ref 140–450)
PMV BLD AUTO: 10.2 FL (ref 6–12)
RBC # BLD AUTO: 4.19 10*6/MM3 (ref 3.77–5.28)
WBC # BLD AUTO: 7 10*3/MM3 (ref 3.4–10.8)

## 2021-06-03 PROCEDURE — 85025 COMPLETE CBC W/AUTO DIFF WBC: CPT

## 2021-06-03 PROCEDURE — 36415 COLL VENOUS BLD VENIPUNCTURE: CPT

## 2021-06-04 ENCOUNTER — TELEPHONE (OUTPATIENT)
Dept: GASTROENTEROLOGY | Facility: CLINIC | Age: 51
End: 2021-06-04

## 2021-06-11 ENCOUNTER — TRANSCRIBE ORDERS (OUTPATIENT)
Dept: LAB | Facility: HOSPITAL | Age: 51
End: 2021-06-11

## 2021-06-11 DIAGNOSIS — Z01.818 PREOPERATIVE TESTING: Primary | ICD-10-CM

## 2021-06-15 ENCOUNTER — HOSPITAL ENCOUNTER (OUTPATIENT)
Dept: WOMENS IMAGING | Age: 51
Discharge: HOME | End: 2021-06-15
Payer: COMMERCIAL

## 2021-06-15 ENCOUNTER — LAB (OUTPATIENT)
Dept: LAB | Facility: HOSPITAL | Age: 51
End: 2021-06-15

## 2021-06-15 DIAGNOSIS — Z12.31 ENCOUNTER FOR SCREENING MAMMOGRAM FOR MALIGNANT NEOPLASM OF BREAST: ICD-10-CM

## 2021-06-15 LAB — SARS-COV-2 ORF1AB RESP QL NAA+PROBE: NOT DETECTED

## 2021-06-15 PROCEDURE — 77067 SCR MAMMO BI INCL CAD: CPT

## 2021-06-15 PROCEDURE — U0004 COV-19 TEST NON-CDC HGH THRU: HCPCS | Performed by: INTERNAL MEDICINE

## 2021-06-15 PROCEDURE — C9803 HOPD COVID-19 SPEC COLLECT: HCPCS | Performed by: INTERNAL MEDICINE

## 2021-06-18 ENCOUNTER — ANESTHESIA EVENT (OUTPATIENT)
Dept: GASTROENTEROLOGY | Facility: HOSPITAL | Age: 51
End: 2021-06-18

## 2021-06-18 ENCOUNTER — ANESTHESIA (OUTPATIENT)
Dept: GASTROENTEROLOGY | Facility: HOSPITAL | Age: 51
End: 2021-06-18

## 2021-06-18 ENCOUNTER — HOSPITAL ENCOUNTER (OUTPATIENT)
Facility: HOSPITAL | Age: 51
Setting detail: HOSPITAL OUTPATIENT SURGERY
Discharge: HOME | End: 2021-06-18
Attending: INTERNAL MEDICINE | Admitting: INTERNAL MEDICINE

## 2021-06-18 VITALS
BODY MASS INDEX: 23.05 KG/M2 | HEART RATE: 75 BPM | TEMPERATURE: 97.7 F | SYSTOLIC BLOOD PRESSURE: 101 MMHG | DIASTOLIC BLOOD PRESSURE: 64 MMHG | WEIGHT: 161 LBS | OXYGEN SATURATION: 100 % | RESPIRATION RATE: 17 BRPM | HEIGHT: 70 IN

## 2021-06-18 DIAGNOSIS — R11.0 NAUSEA: ICD-10-CM

## 2021-06-18 DIAGNOSIS — Z80.0 FAMILY HISTORY OF COLON CANCER: ICD-10-CM

## 2021-06-18 DIAGNOSIS — R19.7 DIARRHEA, UNSPECIFIED TYPE: ICD-10-CM

## 2021-06-18 DIAGNOSIS — K92.1 BLACK STOOLS: ICD-10-CM

## 2021-06-18 PROCEDURE — 88305 TISSUE EXAM BY PATHOLOGIST: CPT | Performed by: INTERNAL MEDICINE

## 2021-06-18 PROCEDURE — 43239 EGD BIOPSY SINGLE/MULTIPLE: CPT | Performed by: INTERNAL MEDICINE

## 2021-06-18 PROCEDURE — 25010000002 PROPOFOL 10 MG/ML EMULSION: Performed by: NURSE ANESTHETIST, CERTIFIED REGISTERED

## 2021-06-18 PROCEDURE — 45380 COLONOSCOPY AND BIOPSY: CPT | Performed by: INTERNAL MEDICINE

## 2021-06-18 PROCEDURE — 87081 CULTURE SCREEN ONLY: CPT | Performed by: INTERNAL MEDICINE

## 2021-06-18 RX ORDER — SODIUM CHLORIDE 9 MG/ML
500 INJECTION, SOLUTION INTRAVENOUS CONTINUOUS PRN
Status: DISCONTINUED | OUTPATIENT
Start: 2021-06-18 | End: 2021-06-18 | Stop reason: HOSPADM

## 2021-06-18 RX ORDER — LIDOCAINE HYDROCHLORIDE 20 MG/ML
INJECTION, SOLUTION EPIDURAL; INFILTRATION; INTRACAUDAL; PERINEURAL AS NEEDED
Status: DISCONTINUED | OUTPATIENT
Start: 2021-06-18 | End: 2021-06-18 | Stop reason: SURG

## 2021-06-18 RX ORDER — PANTOPRAZOLE SODIUM 40 MG/1
40 TABLET, DELAYED RELEASE ORAL DAILY
Qty: 30 TABLET | Refills: 11 | Status: SHIPPED | OUTPATIENT
Start: 2021-06-18

## 2021-06-18 RX ORDER — LIDOCAINE HYDROCHLORIDE 10 MG/ML
0.5 INJECTION, SOLUTION EPIDURAL; INFILTRATION; INTRACAUDAL; PERINEURAL ONCE AS NEEDED
Status: DISCONTINUED | OUTPATIENT
Start: 2021-06-18 | End: 2021-06-18 | Stop reason: HOSPADM

## 2021-06-18 RX ORDER — SODIUM CHLORIDE 0.9 % (FLUSH) 0.9 %
10 SYRINGE (ML) INJECTION AS NEEDED
Status: DISCONTINUED | OUTPATIENT
Start: 2021-06-18 | End: 2021-06-18 | Stop reason: HOSPADM

## 2021-06-18 RX ORDER — PROPOFOL 10 MG/ML
VIAL (ML) INTRAVENOUS AS NEEDED
Status: DISCONTINUED | OUTPATIENT
Start: 2021-06-18 | End: 2021-06-18 | Stop reason: SURG

## 2021-06-18 RX ADMIN — PROPOFOL 50 MG: 10 INJECTION, EMULSION INTRAVENOUS at 14:07

## 2021-06-18 RX ADMIN — LIDOCAINE HYDROCHLORIDE 100 MG: 20 INJECTION, SOLUTION EPIDURAL; INFILTRATION; INTRACAUDAL; PERINEURAL at 13:55

## 2021-06-18 RX ADMIN — PROPOFOL 50 MG: 10 INJECTION, EMULSION INTRAVENOUS at 14:02

## 2021-06-18 RX ADMIN — PROPOFOL 70 MG: 10 INJECTION, EMULSION INTRAVENOUS at 14:12

## 2021-06-18 RX ADMIN — PROPOFOL 100 MG: 10 INJECTION, EMULSION INTRAVENOUS at 13:58

## 2021-06-18 RX ADMIN — PROPOFOL 100 MG: 10 INJECTION, EMULSION INTRAVENOUS at 13:55

## 2021-06-18 RX ADMIN — SODIUM CHLORIDE 500 ML: 9 INJECTION, SOLUTION INTRAVENOUS at 12:56

## 2021-06-18 RX ADMIN — PROPOFOL 50 MG: 10 INJECTION, EMULSION INTRAVENOUS at 13:56

## 2021-06-18 RX ADMIN — PROPOFOL 50 MG: 10 INJECTION, EMULSION INTRAVENOUS at 14:00

## 2021-06-18 NOTE — ANESTHESIA PREPROCEDURE EVALUATION
Anesthesia Evaluation     Patient summary reviewed   no history of anesthetic complications:  NPO Solid Status: > 8 hours             Airway   Mallampati: II  Dental      Pulmonary - negative pulmonary ROS   Cardiovascular - negative cardio ROS  Exercise tolerance: excellent (>7 METS)        Neuro/Psych- negative ROS  GI/Hepatic/Renal/Endo    (+)   thyroid problem hypothyroidism    Musculoskeletal     Abdominal    Substance History      OB/GYN          Other   arthritis,                      Anesthesia Plan    ASA 2     MAC       Anesthetic plan, all risks, benefits, and alternatives have been provided, discussed and informed consent has been obtained with: patient.

## 2021-06-19 LAB — UREASE TISS QL: NEGATIVE

## 2021-06-21 LAB
CYTO UR: NORMAL
LAB AP CASE REPORT: NORMAL
PATH REPORT.FINAL DX SPEC: NORMAL
PATH REPORT.GROSS SPEC: NORMAL

## 2021-06-21 NOTE — ANESTHESIA POSTPROCEDURE EVALUATION
"Patient: Yusra Fisher    Procedure Summary     Date: 06/18/21 Room / Location: [facility] ENDOSCOPY 6 / [facility]    Anesthesia Start: 1352 Anesthesia Stop: 1427    Procedures:       ESOPHAGOGASTRODUODENOSCOPY WITH ANESTHESIA (N/A )      COLONOSCOPY WITH ANESTHESIA (N/A ) Diagnosis:       Diarrhea, unspecified type      Family history of colon cancer      Black stools      Nausea      (Diarrhea, unspecified type [R19.7])      (Family history of colon cancer [Z80.0])      (Black stools [K92.1])      (Nausea [R11.0])    Surgeons: Aaliyah Holguin MD Provider: Jet Maki CRNA    Anesthesia Type: MAC ASA Status: 2          Anesthesia Type: MAC    Vitals  Vitals Value Taken Time   /62 06/18/21 1445   Temp     Pulse 68 06/18/21 1447   Resp 17 06/18/21 1445   SpO2 97 % 06/18/21 1447   Vitals shown include unvalidated device data.        Post Anesthesia Care and Evaluation    Patient location during evaluation: PACU  Patient participation: complete - patient participated  Level of consciousness: awake and alert  Pain management: adequate  Airway patency: patent  Anesthetic complications: No anesthetic complications    Cardiovascular status: acceptable  Respiratory status: acceptable  Hydration status: acceptable    Comments: Blood pressure 101/64, pulse 75, temperature 97.7 °F (36.5 °C), temperature source Temporal, resp. rate 17, height 177.8 cm (70\"), weight 73 kg (161 lb), last menstrual period 10/26/2018, SpO2 100 %, not currently breastfeeding.    Pt discharged from PACU based on kodi score >8      "

## 2021-08-24 ENCOUNTER — TRANSCRIBE ORDERS (OUTPATIENT)
Dept: GENERAL RADIOLOGY | Facility: HOSPITAL | Age: 51
End: 2021-08-24

## 2021-08-24 ENCOUNTER — HOSPITAL ENCOUNTER (OUTPATIENT)
Dept: GENERAL RADIOLOGY | Facility: HOSPITAL | Age: 51
Discharge: HOME | End: 2021-08-24
Admitting: NURSE PRACTITIONER

## 2021-08-24 DIAGNOSIS — R10.9 FLANK PAIN: ICD-10-CM

## 2021-08-24 DIAGNOSIS — R10.9 FLANK PAIN: Primary | ICD-10-CM

## 2021-08-24 DIAGNOSIS — R31.9 HEMATURIA, UNSPECIFIED TYPE: ICD-10-CM

## 2021-08-24 PROCEDURE — 74018 RADEX ABDOMEN 1 VIEW: CPT

## 2021-08-26 ENCOUNTER — HOSPITAL ENCOUNTER (OUTPATIENT)
Dept: CT IMAGING | Facility: HOSPITAL | Age: 51
Discharge: HOME | End: 2021-08-26
Admitting: NURSE PRACTITIONER

## 2021-08-26 PROCEDURE — 74176 CT ABD & PELVIS W/O CONTRAST: CPT

## 2021-10-11 ENCOUNTER — TRANSCRIBE ORDERS (OUTPATIENT)
Dept: ADMINISTRATIVE | Facility: HOSPITAL | Age: 51
End: 2021-10-11

## 2021-10-11 DIAGNOSIS — Z00.00 ENCOUNTER FOR GENERAL ADULT MEDICAL EXAMINATION WITHOUT ABNORMAL FINDINGS: Primary | ICD-10-CM

## 2021-10-11 DIAGNOSIS — R19.7 DIARRHEA, UNSPECIFIED TYPE: ICD-10-CM

## 2021-10-11 DIAGNOSIS — Z13.820 SCREENING FOR OSTEOPOROSIS: ICD-10-CM

## 2021-10-14 ENCOUNTER — HOSPITAL ENCOUNTER (OUTPATIENT)
Dept: ULTRASOUND IMAGING | Facility: HOSPITAL | Age: 51
Discharge: HOME | End: 2021-10-14

## 2021-10-14 ENCOUNTER — HOSPITAL ENCOUNTER (OUTPATIENT)
Dept: BONE DENSITY | Facility: HOSPITAL | Age: 51
Discharge: HOME | End: 2021-10-14

## 2021-10-14 DIAGNOSIS — Z13.820 SCREENING FOR OSTEOPOROSIS: ICD-10-CM

## 2021-10-14 DIAGNOSIS — Z00.00 ENCOUNTER FOR GENERAL ADULT MEDICAL EXAMINATION WITHOUT ABNORMAL FINDINGS: ICD-10-CM

## 2021-10-14 PROCEDURE — 77080 DXA BONE DENSITY AXIAL: CPT

## 2021-10-14 PROCEDURE — 76705 ECHO EXAM OF ABDOMEN: CPT

## 2021-10-18 ENCOUNTER — TRANSCRIBE ORDERS (OUTPATIENT)
Dept: LAB | Facility: HOSPITAL | Age: 51
End: 2021-10-18

## 2021-10-18 DIAGNOSIS — Z01.818 PREOPERATIVE TESTING: Primary | ICD-10-CM

## 2021-10-20 ENCOUNTER — LAB (OUTPATIENT)
Dept: LAB | Facility: HOSPITAL | Age: 51
End: 2021-10-20

## 2021-10-20 ENCOUNTER — PRE-ADMISSION TESTING (OUTPATIENT)
Dept: PREADMISSION TESTING | Facility: HOSPITAL | Age: 51
End: 2021-10-20

## 2021-10-20 VITALS
RESPIRATION RATE: 16 BRPM | BODY MASS INDEX: 23.83 KG/M2 | HEIGHT: 70 IN | SYSTOLIC BLOOD PRESSURE: 117 MMHG | WEIGHT: 166.45 LBS | DIASTOLIC BLOOD PRESSURE: 65 MMHG | OXYGEN SATURATION: 100 % | HEART RATE: 55 BPM

## 2021-10-20 LAB
ALBUMIN SERPL-MCNC: 4.6 G/DL (ref 3.5–5.2)
ALBUMIN/GLOB SERPL: 2.1 G/DL
ALP SERPL-CCNC: 40 U/L (ref 39–117)
ALT SERPL W P-5'-P-CCNC: 26 U/L (ref 1–33)
ANION GAP SERPL CALCULATED.3IONS-SCNC: 8 MMOL/L (ref 5–15)
AST SERPL-CCNC: 25 U/L (ref 1–32)
BASOPHILS # BLD AUTO: 0.02 10*3/MM3 (ref 0–0.2)
BASOPHILS NFR BLD AUTO: 0.5 % (ref 0–1.5)
BILIRUB SERPL-MCNC: 0.5 MG/DL (ref 0–1.2)
BUN SERPL-MCNC: 24 MG/DL (ref 6–20)
BUN/CREAT SERPL: 43.6 (ref 7–25)
CALCIUM SPEC-SCNC: 9 MG/DL (ref 8.6–10.5)
CHLORIDE SERPL-SCNC: 104 MMOL/L (ref 98–107)
CO2 SERPL-SCNC: 27 MMOL/L (ref 22–29)
CREAT SERPL-MCNC: 0.55 MG/DL (ref 0.57–1)
DEPRECATED RDW RBC AUTO: 46.6 FL (ref 37–54)
EOSINOPHIL # BLD AUTO: 0.21 10*3/MM3 (ref 0–0.4)
EOSINOPHIL NFR BLD AUTO: 4.8 % (ref 0.3–6.2)
ERYTHROCYTE [DISTWIDTH] IN BLOOD BY AUTOMATED COUNT: 13 % (ref 12.3–15.4)
GFR SERPL CREATININE-BSD FRML MDRD: 116 ML/MIN/1.73
GLOBULIN UR ELPH-MCNC: 2.2 GM/DL
GLUCOSE SERPL-MCNC: 95 MG/DL (ref 65–99)
HCT VFR BLD AUTO: 38.6 % (ref 34–46.6)
HGB BLD-MCNC: 12.7 G/DL (ref 12–15.9)
IMM GRANULOCYTES # BLD AUTO: 0.01 10*3/MM3 (ref 0–0.05)
IMM GRANULOCYTES NFR BLD AUTO: 0.2 % (ref 0–0.5)
LYMPHOCYTES # BLD AUTO: 1.26 10*3/MM3 (ref 0.7–3.1)
LYMPHOCYTES NFR BLD AUTO: 28.8 % (ref 19.6–45.3)
MCH RBC QN AUTO: 31.7 PG (ref 26.6–33)
MCHC RBC AUTO-ENTMCNC: 32.9 G/DL (ref 31.5–35.7)
MCV RBC AUTO: 96.3 FL (ref 79–97)
MONOCYTES # BLD AUTO: 0.29 10*3/MM3 (ref 0.1–0.9)
MONOCYTES NFR BLD AUTO: 6.6 % (ref 5–12)
NEUTROPHILS NFR BLD AUTO: 2.58 10*3/MM3 (ref 1.7–7)
NEUTROPHILS NFR BLD AUTO: 59.1 % (ref 42.7–76)
NRBC BLD AUTO-RTO: 0 /100 WBC (ref 0–0.2)
PLATELET # BLD AUTO: 183 10*3/MM3 (ref 140–450)
PMV BLD AUTO: 11.5 FL (ref 6–12)
POTASSIUM SERPL-SCNC: 4.5 MMOL/L (ref 3.5–5.2)
PROT SERPL-MCNC: 6.8 G/DL (ref 6–8.5)
RBC # BLD AUTO: 4.01 10*6/MM3 (ref 3.77–5.28)
SARS-COV-2 ORF1AB RESP QL NAA+PROBE: NOT DETECTED
SODIUM SERPL-SCNC: 139 MMOL/L (ref 136–145)
WBC # BLD AUTO: 4.37 10*3/MM3 (ref 3.4–10.8)

## 2021-10-20 PROCEDURE — 36415 COLL VENOUS BLD VENIPUNCTURE: CPT

## 2021-10-20 PROCEDURE — 85025 COMPLETE CBC W/AUTO DIFF WBC: CPT

## 2021-10-20 PROCEDURE — 93005 ELECTROCARDIOGRAM TRACING: CPT

## 2021-10-20 PROCEDURE — U0004 COV-19 TEST NON-CDC HGH THRU: HCPCS | Performed by: SPECIALIST

## 2021-10-20 PROCEDURE — 93010 ELECTROCARDIOGRAM REPORT: CPT | Performed by: INTERNAL MEDICINE

## 2021-10-20 PROCEDURE — 80053 COMPREHEN METABOLIC PANEL: CPT

## 2021-10-20 PROCEDURE — C9803 HOPD COVID-19 SPEC COLLECT: HCPCS | Performed by: SPECIALIST

## 2021-10-22 ENCOUNTER — HOSPITAL ENCOUNTER (OUTPATIENT)
Facility: HOSPITAL | Age: 51
Setting detail: HOSPITAL OUTPATIENT SURGERY
Discharge: HOME | End: 2021-10-22
Attending: SPECIALIST | Admitting: SPECIALIST

## 2021-10-22 ENCOUNTER — ANESTHESIA (OUTPATIENT)
Dept: PERIOP | Facility: HOSPITAL | Age: 51
End: 2021-10-22

## 2021-10-22 ENCOUNTER — ANESTHESIA EVENT (OUTPATIENT)
Dept: PERIOP | Facility: HOSPITAL | Age: 51
End: 2021-10-22

## 2021-10-22 VITALS
TEMPERATURE: 97.4 F | HEART RATE: 59 BPM | RESPIRATION RATE: 16 BRPM | OXYGEN SATURATION: 97 % | DIASTOLIC BLOOD PRESSURE: 59 MMHG | SYSTOLIC BLOOD PRESSURE: 121 MMHG

## 2021-10-22 DIAGNOSIS — K80.20 BILIARY CALCULUS: ICD-10-CM

## 2021-10-22 LAB
QT INTERVAL: 434 MS
QTC INTERVAL: 429 MS

## 2021-10-22 PROCEDURE — 25010000002 FENTANYL CITRATE (PF) 50 MCG/ML SOLUTION: Performed by: ANESTHESIOLOGY

## 2021-10-22 PROCEDURE — C1889 IMPLANT/INSERT DEVICE, NOC: HCPCS | Performed by: SPECIALIST

## 2021-10-22 PROCEDURE — 25010000002 DEXAMETHASONE PER 1 MG: Performed by: NURSE ANESTHETIST, CERTIFIED REGISTERED

## 2021-10-22 PROCEDURE — 25010000002 FENTANYL CITRATE (PF) 250 MCG/5ML SOLUTION: Performed by: NURSE ANESTHETIST, CERTIFIED REGISTERED

## 2021-10-22 PROCEDURE — 25010000002 PROPOFOL 10 MG/ML EMULSION: Performed by: NURSE ANESTHETIST, CERTIFIED REGISTERED

## 2021-10-22 PROCEDURE — 25010000002 DEXAMETHASONE PER 1 MG: Performed by: ANESTHESIOLOGY

## 2021-10-22 PROCEDURE — 25010000002 ONDANSETRON PER 1 MG: Performed by: NURSE ANESTHETIST, CERTIFIED REGISTERED

## 2021-10-22 PROCEDURE — 88304 TISSUE EXAM BY PATHOLOGIST: CPT | Performed by: SPECIALIST

## 2021-10-22 DEVICE — LIGACLIP 10-M/L, 10MM ENDOSCOPIC ROTATING MULTIPLE CLIP APPLIERS
Type: IMPLANTABLE DEVICE | Site: ABDOMEN | Status: FUNCTIONAL
Brand: LIGACLIP

## 2021-10-22 RX ORDER — DEXAMETHASONE SODIUM PHOSPHATE 4 MG/ML
INJECTION, SOLUTION INTRA-ARTICULAR; INTRALESIONAL; INTRAMUSCULAR; INTRAVENOUS; SOFT TISSUE AS NEEDED
Status: DISCONTINUED | OUTPATIENT
Start: 2021-10-22 | End: 2021-10-22 | Stop reason: SURG

## 2021-10-22 RX ORDER — FLUMAZENIL 0.1 MG/ML
0.2 INJECTION INTRAVENOUS AS NEEDED
Status: DISCONTINUED | OUTPATIENT
Start: 2021-10-22 | End: 2021-10-22 | Stop reason: HOSPADM

## 2021-10-22 RX ORDER — NALOXONE HCL 0.4 MG/ML
0.4 VIAL (ML) INJECTION AS NEEDED
Status: DISCONTINUED | OUTPATIENT
Start: 2021-10-22 | End: 2021-10-22 | Stop reason: HOSPADM

## 2021-10-22 RX ORDER — CIPROFLOXACIN 500 MG/1
500 TABLET, FILM COATED ORAL EVERY 12 HOURS SCHEDULED
Status: DISCONTINUED | OUTPATIENT
Start: 2021-10-22 | End: 2021-10-22 | Stop reason: HOSPADM

## 2021-10-22 RX ORDER — ACETAMINOPHEN 500 MG
1000 TABLET ORAL ONCE
Status: COMPLETED | OUTPATIENT
Start: 2021-10-22 | End: 2021-10-22

## 2021-10-22 RX ORDER — ONDANSETRON 2 MG/ML
INJECTION INTRAMUSCULAR; INTRAVENOUS AS NEEDED
Status: DISCONTINUED | OUTPATIENT
Start: 2021-10-22 | End: 2021-10-22 | Stop reason: SURG

## 2021-10-22 RX ORDER — SCOLOPAMINE TRANSDERMAL SYSTEM 1 MG/1
1 PATCH, EXTENDED RELEASE TRANSDERMAL CONTINUOUS
Status: DISCONTINUED | OUTPATIENT
Start: 2021-10-22 | End: 2021-10-22 | Stop reason: HOSPADM

## 2021-10-22 RX ORDER — LIDOCAINE HYDROCHLORIDE 10 MG/ML
0.5 INJECTION, SOLUTION EPIDURAL; INFILTRATION; INTRACAUDAL; PERINEURAL ONCE AS NEEDED
Status: DISCONTINUED | OUTPATIENT
Start: 2021-10-22 | End: 2021-10-22 | Stop reason: SDUPTHER

## 2021-10-22 RX ORDER — LABETALOL HYDROCHLORIDE 5 MG/ML
5 INJECTION, SOLUTION INTRAVENOUS
Status: DISCONTINUED | OUTPATIENT
Start: 2021-10-22 | End: 2021-10-22 | Stop reason: HOSPADM

## 2021-10-22 RX ORDER — NEOSTIGMINE METHYLSULFATE 5 MG/5 ML
SYRINGE (ML) INTRAVENOUS AS NEEDED
Status: DISCONTINUED | OUTPATIENT
Start: 2021-10-22 | End: 2021-10-22 | Stop reason: SURG

## 2021-10-22 RX ORDER — SODIUM CHLORIDE, SODIUM LACTATE, POTASSIUM CHLORIDE, CALCIUM CHLORIDE 600; 310; 30; 20 MG/100ML; MG/100ML; MG/100ML; MG/100ML
100 INJECTION, SOLUTION INTRAVENOUS CONTINUOUS
Status: DISCONTINUED | OUTPATIENT
Start: 2021-10-22 | End: 2021-10-22 | Stop reason: HOSPADM

## 2021-10-22 RX ORDER — OXYCODONE HYDROCHLORIDE AND ACETAMINOPHEN 5; 325 MG/1; MG/1
1 TABLET ORAL EVERY 4 HOURS PRN
Qty: 30 TABLET | Refills: 0 | Status: SHIPPED | OUTPATIENT
Start: 2021-10-22 | End: 2022-11-16

## 2021-10-22 RX ORDER — MAGNESIUM HYDROXIDE 1200 MG/15ML
LIQUID ORAL AS NEEDED
Status: DISCONTINUED | OUTPATIENT
Start: 2021-10-22 | End: 2021-10-22 | Stop reason: HOSPADM

## 2021-10-22 RX ORDER — CIPROFLOXACIN 500 MG/1
500 TABLET, FILM COATED ORAL EVERY 12 HOURS SCHEDULED
Qty: 14 TABLET | Refills: 0 | Status: SHIPPED | OUTPATIENT
Start: 2021-10-22 | End: 2021-10-29

## 2021-10-22 RX ORDER — FENTANYL CITRATE 50 UG/ML
25 INJECTION, SOLUTION INTRAMUSCULAR; INTRAVENOUS
Status: DISCONTINUED | OUTPATIENT
Start: 2021-10-22 | End: 2021-10-22 | Stop reason: HOSPADM

## 2021-10-22 RX ORDER — LIDOCAINE HYDROCHLORIDE 20 MG/ML
INJECTION, SOLUTION EPIDURAL; INFILTRATION; INTRACAUDAL; PERINEURAL AS NEEDED
Status: DISCONTINUED | OUTPATIENT
Start: 2021-10-22 | End: 2021-10-22 | Stop reason: SURG

## 2021-10-22 RX ORDER — FENTANYL CITRATE 50 UG/ML
INJECTION, SOLUTION INTRAMUSCULAR; INTRAVENOUS AS NEEDED
Status: DISCONTINUED | OUTPATIENT
Start: 2021-10-22 | End: 2021-10-22 | Stop reason: SURG

## 2021-10-22 RX ORDER — OXYCODONE HYDROCHLORIDE AND ACETAMINOPHEN 5; 325 MG/1; MG/1
1 TABLET ORAL ONCE AS NEEDED
Status: CANCELLED | OUTPATIENT
Start: 2021-10-22 | End: 2021-10-29

## 2021-10-22 RX ORDER — BUPIVACAINE HYDROCHLORIDE AND EPINEPHRINE 2.5; 5 MG/ML; UG/ML
INJECTION, SOLUTION INFILTRATION; PERINEURAL AS NEEDED
Status: DISCONTINUED | OUTPATIENT
Start: 2021-10-22 | End: 2021-10-22 | Stop reason: HOSPADM

## 2021-10-22 RX ORDER — ROCURONIUM BROMIDE 10 MG/ML
INJECTION, SOLUTION INTRAVENOUS AS NEEDED
Status: DISCONTINUED | OUTPATIENT
Start: 2021-10-22 | End: 2021-10-22 | Stop reason: SURG

## 2021-10-22 RX ORDER — LIDOCAINE HYDROCHLORIDE 10 MG/ML
0.5 INJECTION, SOLUTION EPIDURAL; INFILTRATION; INTRACAUDAL; PERINEURAL ONCE AS NEEDED
Status: DISCONTINUED | OUTPATIENT
Start: 2021-10-22 | End: 2021-10-22 | Stop reason: HOSPADM

## 2021-10-22 RX ORDER — OXYCODONE AND ACETAMINOPHEN 7.5; 325 MG/1; MG/1
2 TABLET ORAL EVERY 4 HOURS PRN
Status: DISCONTINUED | OUTPATIENT
Start: 2021-10-22 | End: 2021-10-22 | Stop reason: HOSPADM

## 2021-10-22 RX ORDER — OXYCODONE AND ACETAMINOPHEN 10; 325 MG/1; MG/1
1 TABLET ORAL ONCE AS NEEDED
Status: COMPLETED | OUTPATIENT
Start: 2021-10-22 | End: 2021-10-22

## 2021-10-22 RX ORDER — SODIUM CHLORIDE 0.9 % (FLUSH) 0.9 %
3-10 SYRINGE (ML) INJECTION AS NEEDED
Status: DISCONTINUED | OUTPATIENT
Start: 2021-10-22 | End: 2021-10-22 | Stop reason: HOSPADM

## 2021-10-22 RX ORDER — ONDANSETRON 2 MG/ML
4 INJECTION INTRAMUSCULAR; INTRAVENOUS ONCE AS NEEDED
Status: DISCONTINUED | OUTPATIENT
Start: 2021-10-22 | End: 2021-10-22 | Stop reason: HOSPADM

## 2021-10-22 RX ORDER — SODIUM CHLORIDE 0.9 % (FLUSH) 0.9 %
3 SYRINGE (ML) INJECTION AS NEEDED
Status: DISCONTINUED | OUTPATIENT
Start: 2021-10-22 | End: 2021-10-22 | Stop reason: HOSPADM

## 2021-10-22 RX ORDER — SODIUM CHLORIDE, SODIUM LACTATE, POTASSIUM CHLORIDE, CALCIUM CHLORIDE 600; 310; 30; 20 MG/100ML; MG/100ML; MG/100ML; MG/100ML
1000 INJECTION, SOLUTION INTRAVENOUS CONTINUOUS
Status: DISCONTINUED | OUTPATIENT
Start: 2021-10-22 | End: 2021-10-22 | Stop reason: HOSPADM

## 2021-10-22 RX ORDER — PROPOFOL 10 MG/ML
VIAL (ML) INTRAVENOUS AS NEEDED
Status: DISCONTINUED | OUTPATIENT
Start: 2021-10-22 | End: 2021-10-22 | Stop reason: SURG

## 2021-10-22 RX ORDER — SODIUM CHLORIDE 0.9 % (FLUSH) 0.9 %
3 SYRINGE (ML) INJECTION EVERY 12 HOURS SCHEDULED
Status: DISCONTINUED | OUTPATIENT
Start: 2021-10-22 | End: 2021-10-22 | Stop reason: HOSPADM

## 2021-10-22 RX ORDER — DEXAMETHASONE SODIUM PHOSPHATE 4 MG/ML
4 INJECTION, SOLUTION INTRA-ARTICULAR; INTRALESIONAL; INTRAMUSCULAR; INTRAVENOUS; SOFT TISSUE ONCE AS NEEDED
Status: COMPLETED | OUTPATIENT
Start: 2021-10-22 | End: 2021-10-22

## 2021-10-22 RX ORDER — IBUPROFEN 600 MG/1
600 TABLET ORAL ONCE AS NEEDED
Status: DISCONTINUED | OUTPATIENT
Start: 2021-10-22 | End: 2021-10-22 | Stop reason: HOSPADM

## 2021-10-22 RX ORDER — MIDAZOLAM HYDROCHLORIDE 1 MG/ML
1 INJECTION INTRAMUSCULAR; INTRAVENOUS
Status: DISCONTINUED | OUTPATIENT
Start: 2021-10-22 | End: 2021-10-22 | Stop reason: HOSPADM

## 2021-10-22 RX ORDER — CLINDAMYCIN PHOSPHATE 600 MG/50ML
600 INJECTION INTRAVENOUS
Status: COMPLETED | OUTPATIENT
Start: 2021-10-22 | End: 2021-10-22

## 2021-10-22 RX ADMIN — FENTANYL CITRATE 100 MCG: 50 INJECTION, SOLUTION INTRAMUSCULAR; INTRAVENOUS at 10:34

## 2021-10-22 RX ADMIN — DEXAMETHASONE SODIUM PHOSPHATE 4 MG: 4 INJECTION, SOLUTION INTRA-ARTICULAR; INTRALESIONAL; INTRAMUSCULAR; INTRAVENOUS; SOFT TISSUE at 10:38

## 2021-10-22 RX ADMIN — LIDOCAINE HYDROCHLORIDE 100 MG: 20 INJECTION, SOLUTION EPIDURAL; INFILTRATION; INTRACAUDAL; PERINEURAL at 10:10

## 2021-10-22 RX ADMIN — GLYCOPYRROLATE 0.4 MG: 0.2 INJECTION, SOLUTION INTRAMUSCULAR; INTRAVENOUS at 11:12

## 2021-10-22 RX ADMIN — SODIUM CHLORIDE, POTASSIUM CHLORIDE, SODIUM LACTATE AND CALCIUM CHLORIDE 1000 ML: 600; 310; 30; 20 INJECTION, SOLUTION INTRAVENOUS at 11:49

## 2021-10-22 RX ADMIN — ACETAMINOPHEN 1000 MG: 500 TABLET, FILM COATED ORAL at 09:23

## 2021-10-22 RX ADMIN — SODIUM CHLORIDE, POTASSIUM CHLORIDE, SODIUM LACTATE AND CALCIUM CHLORIDE 1000 ML: 600; 310; 30; 20 INJECTION, SOLUTION INTRAVENOUS at 08:41

## 2021-10-22 RX ADMIN — Medication 4 MG: at 11:13

## 2021-10-22 RX ADMIN — ROCURONIUM BROMIDE 30 MG: 50 INJECTION INTRAVENOUS at 10:11

## 2021-10-22 RX ADMIN — FENTANYL CITRATE 150 MCG: 50 INJECTION, SOLUTION INTRAMUSCULAR; INTRAVENOUS at 10:10

## 2021-10-22 RX ADMIN — FENTANYL CITRATE 25 MCG: 50 INJECTION INTRAMUSCULAR; INTRAVENOUS at 12:10

## 2021-10-22 RX ADMIN — FENTANYL CITRATE 25 MCG: 50 INJECTION INTRAMUSCULAR; INTRAVENOUS at 11:45

## 2021-10-22 RX ADMIN — PROPOFOL 200 MG: 10 INJECTION, EMULSION INTRAVENOUS at 10:11

## 2021-10-22 RX ADMIN — DEXAMETHASONE SODIUM PHOSPHATE 4 MG: 4 INJECTION, SOLUTION INTRA-ARTICULAR; INTRALESIONAL; INTRAMUSCULAR; INTRAVENOUS; SOFT TISSUE at 09:24

## 2021-10-22 RX ADMIN — OXYCODONE AND ACETAMINOPHEN 1 TABLET: 325; 10 TABLET ORAL at 12:02

## 2021-10-22 RX ADMIN — SODIUM CHLORIDE, POTASSIUM CHLORIDE, SODIUM LACTATE AND CALCIUM CHLORIDE: 600; 310; 30; 20 INJECTION, SOLUTION INTRAVENOUS at 10:32

## 2021-10-22 RX ADMIN — CLINDAMYCIN IN 5 PERCENT DEXTROSE 600 MG: 12 INJECTION, SOLUTION INTRAVENOUS at 10:18

## 2021-10-22 RX ADMIN — SCOPALAMINE 1 PATCH: 1 PATCH, EXTENDED RELEASE TRANSDERMAL at 09:24

## 2021-10-22 RX ADMIN — ONDANSETRON 4 MG: 2 INJECTION INTRAMUSCULAR; INTRAVENOUS at 10:38

## 2021-10-22 RX ADMIN — FENTANYL CITRATE 25 MCG: 50 INJECTION INTRAMUSCULAR; INTRAVENOUS at 12:05

## 2021-10-22 RX ADMIN — FENTANYL CITRATE 25 MCG: 50 INJECTION INTRAMUSCULAR; INTRAVENOUS at 11:55

## 2021-10-22 NOTE — ANESTHESIA POSTPROCEDURE EVALUATION
Patient: Yusra Fisher    Procedure Summary     Date: 10/22/21 Room / Location:  [facility] OR 08 /  [facility] OR    Anesthesia Start: 1009 Anesthesia Stop: 1130    Procedure: LAPAROSCOPIC CHOLECYSTECTOMY (N/A Abdomen) Diagnosis: (CHOLELITHIASIS)    Surgeons: Le Yusuf MD Provider: David Sales CRNA    Anesthesia Type: general ASA Status: 2          Anesthesia Type: general    Vitals  Vitals Value Taken Time   /51 10/22/21 1230   Temp 97.4 °F (36.3 °C) 10/22/21 1127   Pulse 58 10/22/21 1237   Resp 14 10/22/21 1230   SpO2 98 % 10/22/21 1237   Vitals shown include unvalidated device data.        Post Anesthesia Care and Evaluation    Patient location during evaluation: PACU  Patient participation: complete - patient participated  Level of consciousness: awake and alert  Pain management: adequate  Airway patency: patent  Anesthetic complications: No anesthetic complications  PONV Status: none  Cardiovascular status: acceptable and hemodynamically stable  Respiratory status: acceptable  Hydration status: acceptable    Comments: Blood pressure 108/53, pulse 63, temperature 97.4 °F (36.3 °C), temperature source Temporal, resp. rate 16, last menstrual period 10/26/2018, SpO2 99 %, not currently breastfeeding.    Patient discharged from PACU based upon Mike score. Please see RN notes for further details

## 2021-10-22 NOTE — ANESTHESIA PREPROCEDURE EVALUATION
Anesthesia Evaluation     history of anesthetic complications: PONV  NPO Solid Status: > 8 hours  NPO Liquid Status: > 8 hours           Airway   Mallampati: I  TM distance: >3 FB  Neck ROM: full  No difficulty expected  Dental      Pulmonary    (-) sleep apnea, not a smoker  Cardiovascular   Exercise tolerance: good (4-7 METS)    (-) hypertension      Neuro/Psych  (-) seizures, TIA, CVA  GI/Hepatic/Renal/Endo    (-) liver disease, no renal disease, diabetes    Musculoskeletal     Abdominal    Substance History      OB/GYN          Other   arthritis,                      Anesthesia Plan    ASA 2     general   total IV anesthesia  intravenous induction     Anesthetic plan, all risks, benefits, and alternatives have been provided, discussed and informed consent has been obtained with: patient.

## 2021-10-22 NOTE — ANESTHESIA PROCEDURE NOTES
Airway  Date/Time: 10/22/2021 10:16 AM  Airway not difficult    General Information and Staff    Patient location during procedure: OR  CRNA: David Sales CRNA    Indications and Patient Condition  Indications for airway management: airway protection    Preoxygenated: yes  Mask difficulty assessment: 0 - not attempted    Final Airway Details  Final airway type: endotracheal airway      Successful airway: ETT  Cuffed: yes   Successful intubation technique: direct laryngoscopy  Blade: Molina  Blade size: 2  ETT size (mm): 7.0  Cormack-Lehane Classification: grade I - full view of glottis  Placement verified by: chest auscultation and capnometry   Cuff volume (mL): 6  Measured from: teeth  ETT/EBT  to teeth (cm): 21  Number of attempts at approach: 1  Assessment: lips, teeth, and gum same as pre-op and atraumatic intubation

## 2021-11-16 NOTE — PROGRESS NOTES
FOLLOW-UP AUDIOMETRIC EVALUATION      Name:  Yusra Fisher  :  1970  Age:  51 y.o.  Date of Evaluation:  2021       History:  Reason for visit:  Ms. Fisher is seen today at the request of SARA Germain for a follow-up hearing evaluation. Patient has a history of sensorineural hearing loss of the left ear with unrestricted hearing of the right ear. Previous audio was on 2019. Patient does not think her hearing has changed since the last audio. She reports she currently has some sinus issues. Patient reports bilateral otalgia and aural fullness. Patient denies tinnitus.     EVALUATION:        RESULTS:    Otoscopic Evaluation:  Right: Minimal cerumen and tympanic membrane visualized  Left: Clear canal and tympanic membrane visualized         Tympanometry (226 Hz):  Bilateral: Type A- normal    Test technique:  Conventional Audiometry via inserts    Reliability:   good    Pure Tone Audiometry:    Right: hearing sensitivity is within normal limits   Left: normal sloping to mild sensorineural hearing loss at 4kHz, rising back to normal         Speech Audiometry:   Right: Speech Reception Threshold (SRT) was obtained at 15 dBHL  Word Recognition scores- excellent or within normal limits (90 - 100%)   Presented at 55 dBHL, with 20 dBHL S/N ratio. NU-6 List 3A, 25 words  Left: Speech Reception Threshold (SRT) was obtained at 20 dBHL  Word Recognition scores- excellent or within normal limits (90 - 100%)   Presented at 60 dBHL, with 20 dBHL S/N ratio. NU-6 List 3A, 25 words      IMPRESSIONS:  Tympanometry showed normal middle ear pressure and static compliance, for both ears. Pure tone thresholds for the right ear show hearing sensitivity is within normal limits, suggesting normal outer/middle ear function and normal cochlear/retrocochlear function. Pure tone thresholds for the left ear show a normal sloping to mild sensorineural hearing loss at 4kHz, rising back to normal, suggesting normal  outer/middle ear function and abnormal cochlear/retrocochlear function. No significant changes from audio in 2019. Patient was counseled with regard to the findings.    Diagnosis:   1. Sensorineural hearing loss (SNHL) of left ear with unrestricted hearing of right ear    2. Otalgia of both ears        RECOMMENDATIONS/PLAN:  Follow-up recommendations per LISA Clarke    Audio follow-up in 1 year     EDUCATION:  Discussed results and recommendations with patient. Questions were addressed and the patient was encouraged to contact our department should concerns arise.      HINA Vu  Licensed Audiologist

## 2021-11-17 ENCOUNTER — PROCEDURE VISIT (OUTPATIENT)
Dept: OTOLARYNGOLOGY | Facility: CLINIC | Age: 51
End: 2021-11-17

## 2021-11-17 ENCOUNTER — OFFICE VISIT (OUTPATIENT)
Dept: OTOLARYNGOLOGY | Facility: CLINIC | Age: 51
End: 2021-11-17

## 2021-11-17 VITALS
TEMPERATURE: 97.9 F | HEART RATE: 80 BPM | WEIGHT: 167 LBS | DIASTOLIC BLOOD PRESSURE: 80 MMHG | RESPIRATION RATE: 16 BRPM | HEIGHT: 70 IN | SYSTOLIC BLOOD PRESSURE: 146 MMHG | BODY MASS INDEX: 23.91 KG/M2

## 2021-11-17 DIAGNOSIS — J30.9 ALLERGIC RHINITIS, UNSPECIFIED SEASONALITY, UNSPECIFIED TRIGGER: ICD-10-CM

## 2021-11-17 DIAGNOSIS — H90.42 SENSORINEURAL HEARING LOSS (SNHL) OF LEFT EAR WITH UNRESTRICTED HEARING OF RIGHT EAR: Primary | ICD-10-CM

## 2021-11-17 DIAGNOSIS — H69.83 DYSFUNCTION OF BOTH EUSTACHIAN TUBES: ICD-10-CM

## 2021-11-17 DIAGNOSIS — J01.00 ACUTE NON-RECURRENT MAXILLARY SINUSITIS: ICD-10-CM

## 2021-11-17 DIAGNOSIS — H92.03 OTALGIA OF BOTH EARS: ICD-10-CM

## 2021-11-17 PROCEDURE — 92567 TYMPANOMETRY: CPT

## 2021-11-17 PROCEDURE — 92557 COMPREHENSIVE HEARING TEST: CPT

## 2021-11-17 PROCEDURE — 99214 OFFICE O/P EST MOD 30 MIN: CPT | Performed by: NURSE PRACTITIONER

## 2021-11-17 RX ORDER — AZELASTINE 1 MG/ML
2 SPRAY, METERED NASAL 2 TIMES DAILY
Qty: 30 ML | Refills: 11 | Status: SHIPPED | OUTPATIENT
Start: 2021-11-17 | End: 2022-11-16 | Stop reason: SDUPTHER

## 2021-11-17 RX ORDER — METHYLPREDNISOLONE 4 MG/1
TABLET ORAL
Qty: 1 EACH | Refills: 0 | Status: SHIPPED | OUTPATIENT
Start: 2021-11-17 | End: 2021-11-23

## 2021-11-17 RX ORDER — CLINDAMYCIN HYDROCHLORIDE 300 MG/1
300 CAPSULE ORAL 3 TIMES DAILY
Qty: 30 CAPSULE | Refills: 0 | Status: SHIPPED | OUTPATIENT
Start: 2021-11-17 | End: 2021-11-27

## 2021-11-17 RX ORDER — FLUTICASONE PROPIONATE 50 MCG
2 SPRAY, SUSPENSION (ML) NASAL DAILY
Qty: 16 G | Refills: 11 | Status: SHIPPED | OUTPATIENT
Start: 2021-11-17 | End: 2022-11-16 | Stop reason: SDUPTHER

## 2021-11-17 NOTE — PATIENT INSTRUCTIONS
CONTACT INFORMATION:  The main office phone number is [phone]. For emergencies after hours and on weekends, this number will convert over to our answering service and the on call provider will answer. Please try to keep non emergent phone calls/ questions to office hours 9am-5pm Monday through Friday.      [facility]  As an alternative, you can sign up and use the Epic MyChart system for more direct and quicker access for non emergent questions/ problems.  [employer] allows you to send messages to your doctor, view your test results, renew your prescriptions, schedule appointments, and more. To sign up, go to [employer] and click on the Sign Up Now link in the New User? box. Enter your [employer] Activation Code exactly as it appears below along with the last four digits of your Social Security Number and your Date of Birth () to complete the sign-up process. If you do not sign up before the expiration date, you must request a new code.     [facility] Activation Code: Activation code not generated  Current [facility] Status: Active     If you have questions, you can email [email or web address] or call [phone] to talk to our [employer] staff. Remember, [employer] is NOT to be used for urgent needs. For medical emergencies, dial 911.     IF YOU SMOKE OR USE TOBACCO PLEASE READ THE FOLLOWING:  Why is smoking bad for me?  Smoking increases the risk of heart disease, lung disease, vascular disease, stroke, and cancer. If you smoke, STOP!        IF YOU SMOKE OR USE TOBACCO PLEASE READ THE FOLLOWING:  Why is smoking bad for me?  Smoking increases the risk of heart disease, lung disease, vascular disease, stroke, and cancer. If you smoke, STOP!     For more information:  Quit Now [place]  -QUIT-NOW  [email or web address]://[email or web address].quitlogix.org/en-[place]/  HEARING LOSS       Hearing loss is the third most common health condition in the [place] affecting more than 1 of every 10 people.  This means that  over 28 million Americans suffer from hearing loss.  The condition varies with age: 1 out of every 25 children, 1 out of every 14 aged 14-44 years old, 1 out of every 7 adults aged 45-65 year old, and 1 out of every 3 adults over the age of 65 years old.    The ear works by receiving sound vibrations, amplifying the sound, and then converting the mechanical vibration into an electrical signal that is sent to the hearing center of the brain.  The ear has three basic parts, each with very specific functions:  The External Ear   This is made up of the parts of the ear you can see (the auricle), and the ear canal.  These structures function to funnel the sound vibrations down into the ear so that they can be processed.  The Middle Ear  The external ear and the middle ear are  by the eardrum (tympanic membrane).  The middle ear is an air-filled space that houses the middle ear bones (ossicles).  Sound waves hit the eardrum and cause it to vibrate.  The vibrations are then transferred to the ear bones that amplify the sound and transfer it to the inner ear (cochlea).  The Inner Ear  The inner ear is a snail-shaped bone that contains a fluid-filled membrane inside another fluid-filled membrane.  It acts like a battery to transform the mechanical vibrations into and electrical signal.  It does this with very delicate hair cells that rest on top of the inner ear membranes.  The electrical signal is then shipped out of the inner ear to the brain where it is processed and understood as sound.    Disease in any one of these parts of the ear can cause hearing loss, but can do so in two different ways.  When there is a problem with the process of bringing the sound to the inner ear (i.e. problems in the external or middle ear) it is called a conductive hearing loss.  Problems in the inner ear or nerves of hearing care called sensorineural hearing losses.  Often times, however, there is a combination of the types of  hearing losses or a mixed hearing loss.  Conductive Hearing Loss  Impairment of the transfer of sound into the inner ear because of problems with the external ear, the eardrum or the middle ear can cause conductive hearing loss.  Conductive losses can often be lessened or cured with medication or surgery, depending on the etiology.  Causes of Conductive Hearing Losses:  • Wax occlusion of the ear canal  • Eardrum perforations  • Stiffened or scarred eardrums  • Fluid in the middle earspace (otitis media with effusion)  • Middle ear infections (acute otitis media)  • Scarring or fixation of the ear bones (tympanosclerosis, otosclerosis)  • Dislocation of the ear bones  • Cholesteatoma: this is a “skin cyst” that develops due to severely retracted eardrums or from skin growth into the middle ear from a chronic eardrum perforation.  Over time the cyst can grow and erode the bones of hearing.  • Middle ear tumors or masses  Sensorineural Hearing Loss  Deficits in hearing due to problems in the inner ear or nerves of hearing are termed sensorineural losses.  These are usually due to damage to the microscopic hair cells in the cochlea, or due to loss of the nerve cells in the hearing nerve.  For the most part, this type of hearing loss cannot be repaired with medication or surgery, except in rare instances.  Often times, however, it can be helped with hearing aids and rarely with cochlear implantation.  This type of loss can also be associated with tinnitus (ringing of the ears) and vertigo (dizziness).  Causes of Sensorineural Hearing Losses:  • Damage from noise exposure  • Aging:  this is often a slow, progressive loss of the high frequencies  • Infection (labrynthitis)  • Secondary loss from the effects of meningitis  • Genetic/familial related hearing loss  • Autoimmune hearing loss (a rheumatoid-like process)  • Temporal bond fracture (severe fracture of the bone around or through the inner ear)  • Medication  induced damage (chemotherapy, high dose IV antibiotics, diuretics)  • Inner ear and skull base tumors (acoustic neuroma, memingioma)        Specific Causes    Ear Infections and Effusion in Children  Any time the middle ear is filled with fluid, as in an active infection or in middle ear effusion, there can be a conductive hearing.  Ear infections are the most common cause of infant hearing loss and are a very treatable type of hearing loss.  Usually the middle ear is filled with air, which allows the bones of hearing to freely move.  When there is fluid in this space it slows the vibration and is literally like trying to hear underwater.  Most of the time this infection or fluid can be treated with medication.  If this does not happen, often myringotomy tube placement can allow for drainage of fluid and allow the middle ear space to remain ventilated.  Congenital Hearing Loss  Hearing loss is the most common birth defect, affecting 3 in every 1000 children born in the [place].  If untreated, this can lead to significant problems in speech, language, and learning.  Recently, most hospitals have early infant screening that can identify newborns with hearing problems.  With accurate diagnosis, these children can often be treated with hearing aids and other forms of treatment that can allow them to develop normal speech and learning.  Noise Induced Hearing Loss  According to the National Institutes of Health, approximately 10 million Americans have hearing losses that are a least partially attributable to loud noise exposure.  Exposures that can cause permanent damage vary, but short exposures of very loud quality (gunfire) can be as bad as longer exposures at lower levels.  Most conversations are at about 65-70 decibels.  Levels over 85dB, with exposures of up to 8 hours a day, will produce permanent hearing loss after many years of exposure.  A stereo headset turned up full blast (about 110 dB) can cause a  significant hearing loss in less than a half an hour.  These losses are due to damage of the hair cells of the inner ear from the excessive vibrational acoustic trauma of the loud noise.  Since this loss is usually nonreversible, hearing protection is essential.  Foam earplugs can provide 15-30dB of noise protection.  Age Related Hearing Loss (Presbyacusis)  Age related hearing loss is another very common form of hearing loss in the elderly.  It is usually a slowly progressive, high frequency sensorineural loss that in nonreversible.  Not all elderly people will have hearing loss as this is very much related to the genetic makeup of the individual.  This can cause the patient to withdraw from social situations, or cause them to seem to have a mood change because of the frustration of not being able to hear a conversation or having to ask people to repeat things.  Most of the time, this can be well treated by amplifying sounds with a hearing aid.    Hearing loss can have a large impact in the way we function on a day to day basis with our environment.  We are fortunate that most hearing loss can be treated by hearing aids or medical and surgical treatments.  If you think you may be suffering from hearing loss, an evaluation by our doctor can help identify the cause and the specific treatment that can help improve your hearing.

## 2021-11-17 NOTE — PROGRESS NOTES
YOB: 1970  Location: [facility]  Location Address: [address] [place] [address], [facility], [address] [place] [address]  Location Phone: [phone]    Chief Complaint   Patient presents with   • Hearing Loss       History of Present Illness  Yusra Fisher is a 51 y.o. female.  Yusra Fisher is here for follow up of ENT complaints. The patient has had problems with ear problems, decreased hearing and allergic rhinitis. She states her hearing has remained the same and she has not had any further ear pain or popping.The symptoms are not localized to a particular location. The patient has had resolved symptoms. The symptoms have been present for the last year There have been no identified factors that aggravate the symptoms. There have been no factors that have improved the symptoms.  Denies ear pain or tinnitus   She does complain of sinus pain and congestion for the past 7 - 10 days   She has been using nasal sprays but symptoms have not resolved          Past Medical History:   Diagnosis Date   • Arthritis    • Family history of colon cancer    • Hypothyroidism    • Migraines    • Pernicious anemia    • PONV (postoperative nausea and vomiting)    • RA (rheumatoid arthritis) (HCC)        Past Surgical History:   Procedure Laterality Date   • ABDOMINOPLASTY     • CHOLECYSTECTOMY WITH INTRAOPERATIVE CHOLANGIOGRAM N/A 10/22/2021    Procedure: LAPAROSCOPIC CHOLECYSTECTOMY;  Surgeon: Le Yusuf MD;  Location: [facility];  Service: General;  Laterality: N/A;   • COLONOSCOPY  2016    The entire examined colon is normal on direct and retroflexion views; No specimens collected; Repeat 10 years   • COLONOSCOPY  2012    Preparation of the colon was fair; The examined portion of the ileum was normal; The entire examined colon is normal on direct and retroflexion views; Repeat 4 years   • COLONOSCOPY  2007    Normal colonoscopy; Repeat 4 years   • COLONOSCOPY N/A 2021    Procedure:  COLONOSCOPY WITH ANESTHESIA;  Surgeon: Aaliyah Holguin MD;  Location: [facility] ENDOSCOPY;  Service: Gastroenterology;  Laterality: N/A;  pre op: diarrhea  post op: normal  PCP: Rajan Boss MD   • DILATATION AND CURETTAGE     • ENDOSCOPY N/A 6/18/2021    Procedure: ESOPHAGOGASTRODUODENOSCOPY WITH ANESTHESIA;  Surgeon: Aaliyah Holguin MD;  Location: [facility] ENDOSCOPY;  Service: Gastroenterology;  Laterality: N/A;  pre op: nausea  post op: normal  PCP: Rajan Boss MD   • ESSURE TUBAL LIGATION     • HYSTERECTOMY  10/13/2020       Outpatient Medications Marked as Taking for the 11/17/21 encounter (Office Visit) with Nicki Cash APRN   Medication Sig Dispense Refill   • Ascorbic Acid (Vitamin C) 500 MG capsule Take 1 capsule by mouth Daily.     • Cyanocobalamin (Vitamin B-12) 1000 MCG sublingual tablet Place 2 tablets under the tongue Daily.     • DHEA 10 MG capsule Take 1 capsule by mouth Daily.     • Hormone Cream Base cream Place 1 application on the skin as directed by provider Daily. 80% Estriol/20% Estradiol/0.5 testosterone Compounded at [facility]     • hydroxychloroquine (PLAQUENIL) 200 MG tablet Take 200 mg by mouth Every Night.     • IRON CR PO Take 85 mg by mouth 2 (two) times a day.     • Magnesium 500 MG tablet Take 1 tablet by mouth 2 (two) times a day.     • pantoprazole (PROTONIX) 40 MG EC tablet Take 1 tablet by mouth Daily. 30 tablet 11   • Progesterone (PROMETRIUM) 100 MG capsule Take 1 capsule by mouth Daily.     • sertraline (ZOLOFT) 25 MG tablet Take 25 mg by mouth Daily.     • Thyroid 60 MG PO tablet Take 90 mg by mouth Daily.     • Vitamin D-Vitamin K (Vitamin K2-Vitamin D3)  MCG-UNIT capsule Take 1 capsule by mouth Daily.     • Zinc 50 MG capsule Take 1 capsule by mouth Daily.         Penicillins and Chlorhexidine gluconate    Family History   Problem Relation Age of Onset   • Diabetes Father    • Colon cancer Maternal Grandfather         In his 60's or 70's   •  Liver cancer Neg Hx    • Liver disease Neg Hx    • Stomach cancer Neg Hx    • Colon polyps Neg Hx    • Rectal cancer Neg Hx        Social History     Socioeconomic History   • Marital status:    Tobacco Use   • Smoking status: Never Smoker   • Smokeless tobacco: Never Used   Vaping Use   • Vaping Use: Never used   Substance and Sexual Activity   • Alcohol use: Not Currently   • Drug use: Never   • Sexual activity: Defer     Partners: Male     Birth control/protection: Surgical     Comment: Essure       Review of Systems   Constitutional: Negative.    HENT:        See HPI   Eyes: Negative.    Respiratory: Positive for cough.    Cardiovascular: Negative.    Gastrointestinal: Negative.    Endocrine: Negative.    Genitourinary: Negative.    Musculoskeletal: Negative.    Skin: Negative.    Allergic/Immunologic: Positive for environmental allergies.   Neurological: Negative.    Hematological: Negative.    Psychiatric/Behavioral: Negative.        Vitals:    11/17/21 1145   BP: 146/80   Pulse: 80   Resp: 16   Temp: 97.9 °F (36.6 °C)       Body mass index is 23.96 kg/m².    Objective     Physical Exam  Vitals reviewed.   Constitutional:       Appearance: She is normal weight.   HENT:      Head: Normocephalic.      Right Ear: Hearing, tympanic membrane, ear canal and external ear normal.      Left Ear: Hearing, tympanic membrane, ear canal and external ear normal.      Nose: Nose normal.      Mouth/Throat:      Lips: Pink.      Pharynx: Oropharynx is clear. Uvula midline.   Musculoskeletal:      Cervical back: Full passive range of motion without pain and normal range of motion.   Neurological:      Mental Status: She is alert.         Assessment/Plan   Diagnoses and all orders for this visit:    1. Sensorineural hearing loss (SNHL) of left ear with unrestricted hearing of right ear (Primary)    2. Dysfunction of both eustachian tubes    3. Allergic rhinitis, unspecified seasonality, unspecified trigger  -      Comprehensive Hearing Test; Future    4. Acute non-recurrent maxillary sinusitis    Other orders  -     fluticasone (FLONASE) 50 MCG/ACT nasal spray; 2 sprays into the nostril(s) as directed by provider Daily.  Dispense: 16 g; Refill: 11  -     azelastine (ASTELIN) 0.1 % nasal spray; 2 sprays into the nostril(s) as directed by provider 2 (Two) Times a Day. Use in each nostril as directed  Dispense: 30 mL; Refill: 11  -     clindamycin (CLEOCIN) 300 MG capsule; Take 1 capsule by mouth 3 (Three) Times a Day for 10 days.  Dispense: 30 capsule; Refill: 0  -     methylPREDNISolone (Medrol) 4 MG dose pack; Take as directed on package instructions.  Dispense: 1 each; Refill: 0      * Surgery not found *  Orders Placed This Encounter   Procedures   • Comprehensive Hearing Test     Standing Status:   Future     Standing Expiration Date:   2022     Order Specific Question:   Laterality     Answer:   Bilateral     Order Specific Question:   Release to patient     Answer:   Immediate     Return in about 6 months (around 2022).       Patient Instructions   CONTACT INFORMATION:  The main office phone number is [phone]. For emergencies after hours and on weekends, this number will convert over to our answering service and the on call provider will answer. Please try to keep non emergent phone calls/ questions to office hours 9am-5pm Monday through Friday.      3D Hubs  As an alternative, you can sign up and use the Epic MyChart system for more direct and quicker access for non emergent questions/ problems.  [facility] 3D Hubs allows you to send messages to your doctor, view your test results, renew your prescriptions, schedule appointments, and more. To sign up, go to [employer] and click on the Sign Up Now link in the New User? box. Enter your 3D Hubs Activation Code exactly as it appears below along with the last four digits of your Social Security Number and your Date of Birth () to  complete the sign-up process. If you do not sign up before the expiration date, you must request a new code.     [employer] Activation Code: Activation code not generated  Current [employer] Status: Active     If you have questions, you can email [email or web address] or call [phone] to talk to our [employer] staff. Remember, Sw[employer]Build[employer] is NOT to be used for urgent needs. For medical emergencies, dial 911.     IF YOU SMOKE OR USE TOBACCO PLEASE READ THE FOLLOWING:  Why is smoking bad for me?  Smoking increases the risk of heart disease, lung disease, vascular disease, stroke, and cancer. If you smoke, STOP!        IF YOU SMOKE OR USE TOBACCO PLEASE READ THE FOLLOWING:  Why is smoking bad for me?  Smoking increases the risk of heart disease, lung disease, vascular disease, stroke, and cancer. If you smoke, STOP!     For more information:  Quit Now [place]  1-800-QUIT-NOW  https://[place].quitlogix.org/en-[place]/  HEARING LOSS       Hearing loss is the third most common health condition in the [place] affecting more than 1 of every 10 people.  This means that over 28 million Americans suffer from hearing loss.  The condition varies with age: 1 out of every 25 children, 1 out of every 14 aged 14-44 years old, 1 out of every 7 adults aged 45-65 year old, and 1 out of every 3 adults over the age of 65 years old.    The ear works by receiving sound vibrations, amplifying the sound, and then converting the mechanical vibration into an electrical signal that is sent to the hearing center of the brain.  The ear has three basic parts, each with very specific functions:  The External Ear   This is made up of the parts of the ear you can see (the auricle), and the ear canal.  These structures function to funnel the sound vibrations down into the ear so that they can be processed.  The Middle Ear  The external ear and the middle ear are  by the eardrum (tympanic membrane).  The middle ear is an air-filled space that  houses the middle ear bones (ossicles).  Sound waves hit the eardrum and cause it to vibrate.  The vibrations are then transferred to the ear bones that amplify the sound and transfer it to the inner ear (cochlea).  The Inner Ear  The inner ear is a snail-shaped bone that contains a fluid-filled membrane inside another fluid-filled membrane.  It acts like a battery to transform the mechanical vibrations into and electrical signal.  It does this with very delicate hair cells that rest on top of the inner ear membranes.  The electrical signal is then shipped out of the inner ear to the brain where it is processed and understood as sound.    Disease in any one of these parts of the ear can cause hearing loss, but can do so in two different ways.  When there is a problem with the process of bringing the sound to the inner ear (i.e. problems in the external or middle ear) it is called a conductive hearing loss.  Problems in the inner ear or nerves of hearing care called sensorineural hearing losses.  Often times, however, there is a combination of the types of hearing losses or a mixed hearing loss.  Conductive Hearing Loss  Impairment of the transfer of sound into the inner ear because of problems with the external ear, the eardrum or the middle ear can cause conductive hearing loss.  Conductive losses can often be lessened or cured with medication or surgery, depending on the etiology.  Causes of Conductive Hearing Losses:  • Wax occlusion of the ear canal  • Eardrum perforations  • Stiffened or scarred eardrums  • Fluid in the middle earspace (otitis media with effusion)  • Middle ear infections (acute otitis media)  • Scarring or fixation of the ear bones (tympanosclerosis, otosclerosis)  • Dislocation of the ear bones  • Cholesteatoma: this is a “skin cyst” that develops due to severely retracted eardrums or from skin growth into the middle ear from a chronic eardrum perforation.  Over time the cyst can grow and  erode the bones of hearing.  • Middle ear tumors or masses  Sensorineural Hearing Loss  Deficits in hearing due to problems in the inner ear or nerves of hearing are termed sensorineural losses.  These are usually due to damage to the microscopic hair cells in the cochlea, or due to loss of the nerve cells in the hearing nerve.  For the most part, this type of hearing loss cannot be repaired with medication or surgery, except in rare instances.  Often times, however, it can be helped with hearing aids and rarely with cochlear implantation.  This type of loss can also be associated with tinnitus (ringing of the ears) and vertigo (dizziness).  Causes of Sensorineural Hearing Losses:  • Damage from noise exposure  • Aging:  this is often a slow, progressive loss of the high frequencies  • Infection (labrynthitis)  • Secondary loss from the effects of meningitis  • Genetic/familial related hearing loss  • Autoimmune hearing loss (a rheumatoid-like process)  • Temporal bond fracture (severe fracture of the bone around or through the inner ear)  • Medication induced damage (chemotherapy, high dose IV antibiotics, diuretics)  • Inner ear and skull base tumors (acoustic neuroma, memingioma)        Specific Causes    Ear Infections and Effusion in Children  Any time the middle ear is filled with fluid, as in an active infection or in middle ear effusion, there can be a conductive hearing.  Ear infections are the most common cause of infant hearing loss and are a very treatable type of hearing loss.  Usually the middle ear is filled with air, which allows the bones of hearing to freely move.  When there is fluid in this space it slows the vibration and is literally like trying to hear underwater.  Most of the time this infection or fluid can be treated with medication.  If this does not happen, often myringotomy tube placement can allow for drainage of fluid and allow the middle ear space to remain ventilated.  Congenital  Hearing Loss  Hearing loss is the most common birth defect, affecting 3 in every 1000 children born in the [place].  If untreated, this can lead to significant problems in speech, language, and learning.  Recently, most hospitals have early infant screening that can identify newborns with hearing problems.  With accurate diagnosis, these children can often be treated with hearing aids and other forms of treatment that can allow them to develop normal speech and learning.  Noise Induced Hearing Loss  According to the National Institutes of Health, approximately 10 million Americans have hearing losses that are a least partially attributable to loud noise exposure.  Exposures that can cause permanent damage vary, but short exposures of very loud quality (gunfire) can be as bad as longer exposures at lower levels.  Most conversations are at about 65-70 decibels.  Levels over 85dB, with exposures of up to 8 hours a day, will produce permanent hearing loss after many years of exposure.  A stereo headset turned up full blast (about 110 dB) can cause a significant hearing loss in less than a half an hour.  These losses are due to damage of the hair cells of the inner ear from the excessive vibrational acoustic trauma of the loud noise.  Since this loss is usually nonreversible, hearing protection is essential.  Foam earplugs can provide 15-30dB of noise protection.  Age Related Hearing Loss (Presbyacusis)  Age related hearing loss is another very common form of hearing loss in the elderly.  It is usually a slowly progressive, high frequency sensorineural loss that in nonreversible.  Not all elderly people will have hearing loss as this is very much related to the genetic makeup of the individual.  This can cause the patient to withdraw from social situations, or cause them to seem to have a mood change because of the frustration of not being able to hear a conversation or having to ask people to repeat things.  Most  of the time, this can be well treated by amplifying sounds with a hearing aid.    Hearing loss can have a large impact in the way we function on a day to day basis with our environment.  We are fortunate that most hearing loss can be treated by hearing aids or medical and surgical treatments.  If you think you may be suffering from hearing loss, an evaluation by our doctor can help identify the cause and the specific treatment that can help improve your hearing.

## 2021-12-03 ENCOUNTER — OFFICE VISIT (OUTPATIENT)
Dept: OBGYN CLINIC | Age: 51
End: 2021-12-03
Payer: COMMERCIAL

## 2021-12-03 VITALS
HEART RATE: 74 BPM | BODY MASS INDEX: 23.91 KG/M2 | HEIGHT: 70 IN | WEIGHT: 167 LBS | DIASTOLIC BLOOD PRESSURE: 70 MMHG | SYSTOLIC BLOOD PRESSURE: 123 MMHG

## 2021-12-03 DIAGNOSIS — Z12.31 ENCOUNTER FOR SCREENING MAMMOGRAM FOR MALIGNANT NEOPLASM OF BREAST: ICD-10-CM

## 2021-12-03 DIAGNOSIS — Z12.72 SCREENING FOR VAGINAL CANCER: ICD-10-CM

## 2021-12-03 DIAGNOSIS — Z01.419 WOMEN'S ANNUAL ROUTINE GYNECOLOGICAL EXAMINATION: Primary | ICD-10-CM

## 2021-12-03 DIAGNOSIS — Z80.3 FAMILY HISTORY OF BREAST CANCER: ICD-10-CM

## 2021-12-03 PROCEDURE — 99396 PREV VISIT EST AGE 40-64: CPT | Performed by: NURSE PRACTITIONER

## 2021-12-03 ASSESSMENT — ENCOUNTER SYMPTOMS
GASTROINTESTINAL NEGATIVE: 1
ALLERGIC/IMMUNOLOGIC NEGATIVE: 1
EYES NEGATIVE: 1
CONSTIPATION: 0
DIARRHEA: 0
RESPIRATORY NEGATIVE: 1

## 2021-12-03 NOTE — PROGRESS NOTES
Pt presents today for pap smear and breast exam.  Pt complains of     Mammo:  Pap smear:  Contraception:  G:  P:  Ab:   Bone density:  Colonoscopy:

## 2021-12-03 NOTE — PATIENT INSTRUCTIONS
Patient Education        Breast Self-Exam: Care Instructions  Your Care Instructions     A breast self-exam is when you check your breasts for lumps or changes. This regular exam helps you learn how your breasts normally look and feel. Most breast problems or changes are not because of cancer. Breast self-exam is not a substitute for a mammogram. Having regular breast exams by your doctor and regular mammograms improve your chances of finding any problems with your breasts. Some women set a time each month to do a step-by-step breast self-exam. Other women like a less formal system. They might look at their breasts as they brush their teeth, or feel their breasts once in a while in the shower. If you notice a change in your breast, tell your doctor. Follow-up care is a key part of your treatment and safety. Be sure to make and go to all appointments, and call your doctor if you are having problems. It's also a good idea to know your test results and keep a list of the medicines you take. How do you do a breast self-exam?  · The best time to examine your breasts is usually one week after your menstrual period begins. Your breasts should not be tender then. If you do not have periods, you might do your exam on a day of the month that is easy to remember. · To examine your breasts:  ? Remove all your clothes above the waist and lie down. When you are lying down, your breast tissue spreads evenly over your chest wall, which makes it easier to feel all your breast tissue. ? Use the pads--not the fingertips--of the 3 middle fingers of your left hand to check your right breast. Move your fingers slowly in small coin-sized circles that overlap. ? Use three levels of pressure to feel of all your breast tissue. Use light pressure to feel the tissue close to the skin surface. Use medium pressure to feel a little deeper. Use firm pressure to feel your tissue close to your breastbone and ribs.  Use each pressure level to feel your breast tissue before moving on to the next spot. ? Check your entire breast, moving up and down as if following a strip from the collarbone to the bra line, and from the armpit to the ribs. Repeat until you have covered the entire breast.  ? Repeat this procedure for your left breast, using the pads of the 3 middle fingers of your right hand. · To examine your breasts while in the shower:  ? Place one arm over your head and lightly soap your breast on that side. ? Using the pads of your fingers, gently move your hand over your breast (in the strip pattern described above), feeling carefully for any lumps or changes. ? Repeat for the other breast.  · Have your doctor inspect anything you notice to see if you need further testing. Where can you learn more? Go to https://Taliciousjaden.Com[employer] TV. org and sign in to your Skycatch account. Enter P148 in the [employer] box to learn more about \"Breast Self-Exam: Care Instructions. \"     If you do not have an account, please click on the \"Sign Up Now\" link. Current as of: December 17, 2020               Content Version: 13.0  © 2006-2021 [employer]. Care instructions adapted under license by [facility] ([facility]). If you have questions about a medical condition or this instruction, always ask your healthcare professional. [place] [address] any warranty or liability for your use of this information. Patient Education        Breast Self-Exam: Care Instructions  Your Care Instructions     A breast self-exam is when you check your breasts for lumps or changes. This regular exam helps you learn how your breasts normally look and feel. Most breast problems or changes are not because of cancer. Breast self-exam is not a substitute for a mammogram. Having regular breast exams by your doctor and regular mammograms improve your chances of finding any problems with your breasts.   Some women set a time each month to do a step-by-step breast self-exam. Other women like a less formal system. They might look at their breasts as they brush their teeth, or feel their breasts once in a while in the shower. If you notice a change in your breast, tell your doctor. Follow-up care is a key part of your treatment and safety. Be sure to make and go to all appointments, and call your doctor if you are having problems. It's also a good idea to know your test results and keep a list of the medicines you take. How do you do a breast self-exam?  · The best time to examine your breasts is usually one week after your menstrual period begins. Your breasts should not be tender then. If you do not have periods, you might do your exam on a day of the month that is easy to remember. · To examine your breasts:  ? Remove all your clothes above the waist and lie down. When you are lying down, your breast tissue spreads evenly over your chest wall, which makes it easier to feel all your breast tissue. ? Use the pads--not the fingertips--of the 3 middle fingers of your left hand to check your right breast. Move your fingers slowly in small coin-sized circles that overlap. ? Use three levels of pressure to feel of all your breast tissue. Use light pressure to feel the tissue close to the skin surface. Use medium pressure to feel a little deeper. Use firm pressure to feel your tissue close to your breastbone and ribs. Use each pressure level to feel your breast tissue before moving on to the next spot. ? Check your entire breast, moving up and down as if following a strip from the collarbone to the bra line, and from the armpit to the ribs. Repeat until you have covered the entire breast.  ? Repeat this procedure for your left breast, using the pads of the 3 middle fingers of your right hand. · To examine your breasts while in the shower:  ? Place one arm over your head and lightly soap your breast on that side. ?  Using the pads of your fingers, gently move your hand over your breast (in the strip pattern described above), feeling carefully for any lumps or changes. ? Repeat for the other breast.  · Have your doctor inspect anything you notice to see if you need further testing. Where can you learn more? Go to https://chjaden.LogicLadder. org and sign in to your [employer]r account. Enter P148 in the [employer] box to learn more about \"Breast Self-Exam: Care Instructions. \"     If you do not have an account, please click on the \"Sign Up Now\" link. Current as of: December 17, 2020               Content Version: 13.0  © 2655-5645 [employer], [employer]. Care instructions adapted under license by [facility] ([facility]). If you have questions about a medical condition or this instruction, always ask your healthcare professional. [address] any warranty or liability for your use of this information.

## 2021-12-03 NOTE — PROGRESS NOTES
breath sounds. Chest:   Breasts:      Right: No inverted nipple, mass, nipple discharge or skin change. Left: No inverted nipple, mass, nipple discharge or skin change. Abdominal:      Palpations: Abdomen is soft. There is no mass. Tenderness: There is no abdominal tenderness. Genitourinary:     General: Normal vulva. Vagina: Normal.      Uterus: Absent. Adnexa:         Right: No mass or tenderness. Left: No mass or tenderness. Comments: Pap collected  Cervix and uterus surgically absent  Ovaries surgically absent  Vaginal cuff palpates smooth  Musculoskeletal:         General: Normal range of motion. Cervical back: Normal range of motion and neck supple. Skin:     General: Skin is warm and dry. Neurological:      Mental Status: She is alert and oriented to person, place, and time. Psychiatric:         Attention and Perception: Attention normal.         Mood and Affect: Mood normal.         Speech: Speech normal.         Behavior: Behavior normal.         Thought Content: Thought content normal.         Cognition and Memory: Cognition normal.         Judgment: Judgment normal.          Diagnosis Orders   1. Women's annual routine gynecological examination     2. Screening for vaginal cancer  PAP SMEAR    Human papillomavirus (HPV) DNA Probe Thin Prep High Risk   3. Encounter for screening mammogram for malignant neoplasm of breast  TERA DIGITAL SCREEN W OR WO CAD BILATERAL   4. Family history of breast cancer         MEDICATIONS:  No orders of the defined types were placed in this encounter.       ORDERS:  Orders Placed This Encounter   Procedures    TERA DIGITAL SCREEN W OR WO CAD BILATERAL    PAP SMEAR    Human papillomavirus (HPV) DNA Probe Thin Prep High Risk       PLAN:  Pap collected  Ordered screening mammogram    Patient Instructions   Patient Education        Breast Self-Exam: Care Instructions  Your Care Instructions     A breast self-exam is when you check your breasts for lumps or changes. This regular exam helps you learn how your breasts normally look and feel. Most breast problems or changes are not because of cancer. Breast self-exam is not a substitute for a mammogram. Having regular breast exams by your doctor and regular mammograms improve your chances of finding any problems with your breasts. Some women set a time each month to do a step-by-step breast self-exam. Other women like a less formal system. They might look at their breasts as they brush their teeth, or feel their breasts once in a while in the shower. If you notice a change in your breast, tell your doctor. Follow-up care is a key part of your treatment and safety. Be sure to make and go to all appointments, and call your doctor if you are having problems. It's also a good idea to know your test results and keep a list of the medicines you take. How do you do a breast self-exam?  · The best time to examine your breasts is usually one week after your menstrual period begins. Your breasts should not be tender then. If you do not have periods, you might do your exam on a day of the month that is easy to remember. · To examine your breasts:  ? Remove all your clothes above the waist and lie down. When you are lying down, your breast tissue spreads evenly over your chest wall, which makes it easier to feel all your breast tissue. ? Use the pads--not the fingertips--of the 3 middle fingers of your left hand to check your right breast. Move your fingers slowly in small coin-sized circles that overlap. ? Use three levels of pressure to feel of all your breast tissue. Use light pressure to feel the tissue close to the skin surface. Use medium pressure to feel a little deeper. Use firm pressure to feel your tissue close to your breastbone and ribs. Use each pressure level to feel your breast tissue before moving on to the next spot. ?  Check your entire breast, moving up and down as if following a strip from the collarbone to the bra line, and from the armpit to the ribs. Repeat until you have covered the entire breast.  ? Repeat this procedure for your left breast, using the pads of the 3 middle fingers of your right hand. · To examine your breasts while in the shower:  ? Place one arm over your head and lightly soap your breast on that side. ? Using the pads of your fingers, gently move your hand over your breast (in the strip pattern described above), feeling carefully for any lumps or changes. ? Repeat for the other breast.  · Have your doctor inspect anything you notice to see if you need further testing. Where can you learn more? Go to https://NetminingpeMyCoopeb.What They Like. org and sign in to your [employer] account. Enter P148 in the [employer] box to learn more about \"Breast Self-Exam: Care Instructions. \"     If you do not have an account, please click on the \"Sign Up Now\" link. Current as of: December 17, 2020               Content Version: 13.0  © 2006-2021 [employer]. Care instructions adapted under license by [facility] ([facility]). If you have questions about a medical condition or this instruction, always ask your healthcare professional. [place] [address] any warranty or liability for your use of this information. Patient Education        Breast Self-Exam: Care Instructions  Your Care Instructions     A breast self-exam is when you check your breasts for lumps or changes. This regular exam helps you learn how your breasts normally look and feel. Most breast problems or changes are not because of cancer. Breast self-exam is not a substitute for a mammogram. Having regular breast exams by your doctor and regular mammograms improve your chances of finding any problems with your breasts. Some women set a time each month to do a step-by-step breast self-exam. Other women like a less formal system.  They might look at their breasts as they brush their teeth, or feel their breasts once in a while in the shower. If you notice a change in your breast, tell your doctor. Follow-up care is a key part of your treatment and safety. Be sure to make and go to all appointments, and call your doctor if you are having problems. It's also a good idea to know your test results and keep a list of the medicines you take. How do you do a breast self-exam?  · The best time to examine your breasts is usually one week after your menstrual period begins. Your breasts should not be tender then. If you do not have periods, you might do your exam on a day of the month that is easy to remember. · To examine your breasts:  ? Remove all your clothes above the waist and lie down. When you are lying down, your breast tissue spreads evenly over your chest wall, which makes it easier to feel all your breast tissue. ? Use the pads--not the fingertips--of the 3 middle fingers of your left hand to check your right breast. Move your fingers slowly in small coin-sized circles that overlap. ? Use three levels of pressure to feel of all your breast tissue. Use light pressure to feel the tissue close to the skin surface. Use medium pressure to feel a little deeper. Use firm pressure to feel your tissue close to your breastbone and ribs. Use each pressure level to feel your breast tissue before moving on to the next spot. ? Check your entire breast, moving up and down as if following a strip from the collarbone to the bra line, and from the armpit to the ribs. Repeat until you have covered the entire breast.  ? Repeat this procedure for your left breast, using the pads of the 3 middle fingers of your right hand. · To examine your breasts while in the shower:  ? Place one arm over your head and lightly soap your breast on that side. ?  Using the pads of your fingers, gently move your hand over your breast (in the strip pattern described above), feeling carefully for any lumps or changes. ? Repeat for the other breast.  · Have your doctor inspect anything you notice to see if you need further testing. Where can you learn more? Go to https://RewardIt.compepiceweb.GLOG. org and sign in to your [employer] account. Enter P148 in the [facility] box to learn more about \"Breast Self-Exam: Care Instructions. \"     If you do not have an account, please click on the \"Sign Up Now\" link. Current as of: December 17, 2020               Content Version: 13.0  © 7953-8284 [employer]wise[employer] Incorporated. Care instructions adapted under license by [facility] ([facility]). If you have questions about a medical condition or this instruction, always ask your healthcare professional. [address] any warranty or liability for your use of this information.

## 2022-09-01 ENCOUNTER — OFFICE VISIT (OUTPATIENT)
Dept: OBGYN CLINIC | Age: 52
End: 2022-09-01
Payer: COMMERCIAL

## 2022-09-01 VITALS
BODY MASS INDEX: 25.62 KG/M2 | HEART RATE: 59 BPM | SYSTOLIC BLOOD PRESSURE: 121 MMHG | WEIGHT: 179 LBS | DIASTOLIC BLOOD PRESSURE: 74 MMHG | HEIGHT: 70 IN

## 2022-09-01 DIAGNOSIS — N39.41 URGE INCONTINENCE: ICD-10-CM

## 2022-09-01 DIAGNOSIS — N32.81 OAB (OVERACTIVE BLADDER): Primary | ICD-10-CM

## 2022-09-01 PROCEDURE — 99214 OFFICE O/P EST MOD 30 MIN: CPT | Performed by: NURSE PRACTITIONER

## 2022-09-01 ASSESSMENT — ENCOUNTER SYMPTOMS
ALLERGIC/IMMUNOLOGIC NEGATIVE: 1
GASTROINTESTINAL NEGATIVE: 1
DIARRHEA: 0
EYES NEGATIVE: 1
RESPIRATORY NEGATIVE: 1
CONSTIPATION: 0

## 2022-09-01 NOTE — PROGRESS NOTES
Pt is here to discuss over active bladder and urine incontinence. Pt states this has been going on since last year but in last 6 months getting worse.

## 2022-09-01 NOTE — PROGRESS NOTES
Jesu Perera is a 45 y.o. female who presents today for her medical conditions/ complaints as noted below. Chanda Artist Ravindrater is c/o of Other (Oab ) and Incontinence        HPI  Pt presents c/o OAB and urge incontinence for the past several months, progressively getting worse. Has to wear a pad everyday. Will stand up to use the restroom and will start leaking on the way. Does not leak with coughing or sneezing. Using a compounded estrogen cream from Dr Juany Tijerina. Does not feel vaginal pain or pressure. Patient's last menstrual period was 10/02/2020.   [id number]    Past Medical History:   Diagnosis Date    Heavy menses     \"constant\"    Hypothyroidism     PONV (postoperative nausea and vomiting)     RA (rheumatoid arthritis) ([facility] [address]     ra     Past Surgical History:   Procedure Laterality Date    BREAST BIOPSY Right 2012    B9    CHOLECYSTECTOMY      10/20/2021    COLONOSCOPY      DILATION AND CURETTAGE OF UTERUS N/A 06/19/2020    HYSTEROSCOPY, ENDOMETRIAL ABLATION WITH [facility], DILATION AND CURETTAGE performed by Chuyita De Los Santos MD at [address] [place] [address] (CERVIX STATUS UNKNOWN) N/A 10/13/2020    EXAM UNDER ANESTHESIA [facility] W/ LEONI BILATERAL SALPINGO OOPHORECTOMY CYSTOSCOPY performed by Chuyita De Los Santos MD at [facility]. 12.      had procedure done similiar to tubal that prevents her from having kids    OVARY REMOVAL Bilateral 10/2020     Family History   Problem Relation Age of Onset    Breast Cancer Maternal Aunt 58        breast    Cancer Maternal Grandfather         colon    Cancer Paternal Grandmother         colon    Cancer Paternal Grandfather         colon    Breast Cancer Other         paternal great aunt-breast    Breast Cancer Sister 50    Breast Cancer Maternal Aunt 64     Social History     Tobacco Use    Smoking status: Never    Smokeless tobacco: Never   Substance Use Topics    Alcohol use: Never       Current Outpatient Medications   Medication Sig Dispense Refill Progesterone Micronized (PROGESTERONE PO) Take by mouth Indications: 100 mg      NONFORMULARY Indications: 80% estriol, 20% estradiol, .05% testosterone SH       sertraline (ZOLOFT) 100 MG tablet Take 100 mg by mouth daily      ibuprofen (ADVIL;MOTRIN) 800 MG tablet Take 1 tablet by mouth every 8 hours as needed for Pain 90 tablet 0    thyroid (ARMOUR) 60 MG tablet Take 90 mg by mouth daily Indications: Underactive Thyroid       ferrous gluconate (FERGON) 324 (38 Fe) MG tablet Take 324 mg by mouth daily (with breakfast)      VITAMIN D PO Take 5,000 Units by mouth daily Indications: vitamin D 3 5000u       DHEA 10 MG CAPS Take 1 capsule by mouth daily       hydroxychloroquine (PLAQUENIL) 200 MG tablet Take 200 mg by mouth 2 times daily       MAGNESIUM CARBONATE PO Take 1 capsule by mouth daily        No current facility-administered medications for this visit. Allergies   Allergen Reactions    Other Rash     surgiprep (used on her when she had breast biopsy)    Pcn [Penicillins] Rash    Hibiclens [Chlorhexidine Gluconate] Rash     Possible; rash with surgical wash during breast surgery. Vitals:    09/01/22 1520   BP: 121/74   Pulse: 59     Body mass index is 25.68 kg/m². Review of Systems   Constitutional: Negative. HENT: Negative. Eyes: Negative. Respiratory: Negative. Cardiovascular: Negative. Gastrointestinal: Negative. Negative for constipation and diarrhea. Endocrine: Negative. Genitourinary:  Positive for enuresis (leaking on the way to the restroom) and urgency. Negative for frequency and menstrual problem. Musculoskeletal: Negative. Skin: Negative. Allergic/Immunologic: Negative. Neurological: Negative. Hematological: Negative. Psychiatric/Behavioral: Negative. All other systems reviewed and are negative. Physical Exam  Genitourinary:     General: Normal vulva. Vagina: No vaginal discharge, erythema or prolapsed vaginal walls.       Adnexa: Right: No mass or tenderness. Left: No mass or tenderness. Comments: Cervix and uterus surgically absent  Grade 2 cystocele  No rectocele       Diagnosis Orders   1. OAB (overactive bladder)        2. Stress incontinence of urine            MEDICATIONS:  No orders of the defined types were placed in this encounter. ORDERS:  No orders of the defined types were placed in this encounter. PLAN:  Discussed OAB and tx options- gave samples of Myrbetriq  Referral to PFPT  Continue HRT as prescribed  May need referral to Urology if no improvement    There are no Patient Instructions on file for this visit.

## 2022-10-13 NOTE — TELEPHONE ENCOUNTER
----- Message from LISA Wills sent at 6/4/2021 11:10 AM CDT -----  Please notify the patient that CBC and CMP were unremarkable.  
minesh pt and carloz mayer   
Negative

## 2022-11-16 ENCOUNTER — PROCEDURE VISIT (OUTPATIENT)
Dept: OTOLARYNGOLOGY | Facility: CLINIC | Age: 52
End: 2022-11-16

## 2022-11-16 ENCOUNTER — OFFICE VISIT (OUTPATIENT)
Dept: OTOLARYNGOLOGY | Facility: CLINIC | Age: 52
End: 2022-11-16

## 2022-11-16 VITALS
WEIGHT: 189 LBS | HEIGHT: 70 IN | DIASTOLIC BLOOD PRESSURE: 63 MMHG | TEMPERATURE: 98.4 F | HEART RATE: 64 BPM | BODY MASS INDEX: 27.06 KG/M2 | SYSTOLIC BLOOD PRESSURE: 105 MMHG

## 2022-11-16 DIAGNOSIS — H90.42 SENSORINEURAL HEARING LOSS (SNHL) OF LEFT EAR WITH UNRESTRICTED HEARING OF RIGHT EAR: Primary | ICD-10-CM

## 2022-11-16 DIAGNOSIS — J30.9 ALLERGIC RHINITIS, UNSPECIFIED SEASONALITY, UNSPECIFIED TRIGGER: ICD-10-CM

## 2022-11-16 DIAGNOSIS — H69.83 DYSFUNCTION OF BOTH EUSTACHIAN TUBES: ICD-10-CM

## 2022-11-16 PROCEDURE — 92557 COMPREHENSIVE HEARING TEST: CPT

## 2022-11-16 PROCEDURE — 92567 TYMPANOMETRY: CPT

## 2022-11-16 PROCEDURE — 99213 OFFICE O/P EST LOW 20 MIN: CPT | Performed by: NURSE PRACTITIONER

## 2022-11-16 RX ORDER — MOMETASONE FUROATE 1 MG/ML
SOLUTION TOPICAL DAILY
Qty: 60 ML | Refills: 1 | Status: SHIPPED | OUTPATIENT
Start: 2022-11-16 | End: 2022-11-23

## 2022-11-16 RX ORDER — FLUTICASONE PROPIONATE 50 MCG
2 SPRAY, SUSPENSION (ML) NASAL DAILY
Qty: 16 G | Refills: 11 | Status: SHIPPED | OUTPATIENT
Start: 2022-11-16

## 2022-11-16 RX ORDER — MIRABEGRON 25 MG/1
TABLET, FILM COATED, EXTENDED RELEASE ORAL
COMMUNITY
Start: 2022-09-19

## 2022-11-16 RX ORDER — AZELASTINE 1 MG/ML
2 SPRAY, METERED NASAL 2 TIMES DAILY
Qty: 30 ML | Refills: 11 | Status: SHIPPED | OUTPATIENT
Start: 2022-11-16

## 2022-11-16 NOTE — PROGRESS NOTES
AUDIOMETRIC EVALUATION      Name:  Yusra Fisher  :  1970  Age:  52 y.o.  Date of Evaluation:  2022       History:  Ms. Fisher is seen today for an annual hearing evaluation due to an asymmetric hearing loss at the request of LISA Clarke. Ms. Fisher does not feel her hearing has changed since her previous hearing evaluation. A previous hearing evaluation completed on 2021 revealed hearing within normal limits for the right ear and hearing within normal limits with a mild (4000 Hz) sensorineural notch for the left ear.    Audiologic Information:  Concerns for Hearing: Left History  PETs:  No  Other otologic surgical history: No  Aural Pressure/Fullness:  No  Otalgia: No  Otorrhea: No  Tinnitus:  No  Dizziness:  No  Noise Exposure: No  Family history of hearing loss: Mom with age  Head trauma requiring hospital stay: No  Chemotherapy: No  Other significant history: None      EVALUATION:        RESULTS:    Otoscopic Evaluation:  Right: Unremarkable  Left: Unremarkable    Tympanometry (226 Hz):  Right: Type A  Left: Type A    Pure Tone Audiometry:    Right: Hearing within normal limits (250 Hz - 8000 Hz)  Left: Hearing within normal limits (250 Hz - 3000 Hz) sloping to a moderate (4000 Hz) sensorineural hearing loss rising to hearing within normal limits (6000 Hz - 8000 Hz)  **No significant change since previous hearing evaluation completed on 2021    Speech Audiometry:   Right: Speech Reception Threshold (SRT) was obtained at 15 dBHL  Word Recognition scores - Excellent (100)% using NU-6 List 3A with 10 words  Left: Speech Reception Threshold (SRT) was obtained at 20 dBHL  Word Recognition scores - Excellent (100)% using NU-6 List 4A with 10 words  SRT/PTA in good agreement.    IMPRESSIONS:  Tympanometry showed normal middle ear pressure and static compliance, bilaterally.  Pure tone thresholds for the right ear show hearing within normal limits at all frequencies tested,  suggesting normal outer/middle ear function and normal cochlear/retrocochlear function.   Pure tone thresholds for the left ear show hearing within normal limits with a moderate sensorineural notch at 1000 Hz, suggesting normal outer/middle ear function and abnormal cochlear/retrocochlear function.   Patient was counseled with regard to the findings.    Diagnosis:  1. Sensorineural hearing loss (SNHL) of left ear with unrestricted hearing of right ear         RECOMMENDATIONS/PLAN:  1. Follow-up recommendations per Nicki Cash, LISA.  2. Repeat hearing evaluation in 1 year.  3. Repeat hearing evaluation if changes in hearing are noted or concerns arise.  4. Discussed results and recommendations with patient. Questions were addressed and the patient was encouraged to contact our department should concerns arise.          Mayra Rebolledo, YASMINE-A, F-AAA  Licensed Audiologist

## 2022-11-16 NOTE — PATIENT INSTRUCTIONS
HEARING LOSS       Hearing loss is the third most common health condition in the [place] affecting more than 1 of every 10 people.  This means that over 28 million Americans suffer from hearing loss.  The condition varies with age: 1 out of every 25 children, 1 out of every 14 aged 14-44 years old, 1 out of every 7 adults aged 45-65 year old, and 1 out of every 3 adults over the age of 65 years old.    The ear works by receiving sound vibrations, amplifying the sound, and then converting the mechanical vibration into an electrical signal that is sent to the hearing center of the brain.  The ear has three basic parts, each with very specific functions:  The External Ear   This is made up of the parts of the ear you can see (the auricle), and the ear canal.  These structures function to funnel the sound vibrations down into the ear so that they can be processed.  The Middle Ear  The external ear and the middle ear are  by the eardrum (tympanic membrane).  The middle ear is an air-filled space that houses the middle ear bones (ossicles).  Sound waves hit the eardrum and cause it to vibrate.  The vibrations are then transferred to the ear bones that amplify the sound and transfer it to the inner ear (cochlea).  The Inner Ear  The inner ear is a snail-shaped bone that contains a fluid-filled membrane inside another fluid-filled membrane.  It acts like a battery to transform the mechanical vibrations into and electrical signal.  It does this with very delicate hair cells that rest on top of the inner ear membranes.  The electrical signal is then shipped out of the inner ear to the brain where it is processed and understood as sound.    Disease in any one of these parts of the ear can cause hearing loss, but can do so in two different ways.  When there is a problem with the process of bringing the sound to the inner ear (i.e. problems in the external or middle ear) it is called a conductive hearing loss.   Problems in the inner ear or nerves of hearing care called sensorineural hearing losses.  Often times, however, there is a combination of the types of hearing losses or a mixed hearing loss.  Conductive Hearing Loss  Impairment of the transfer of sound into the inner ear because of problems with the external ear, the eardrum or the middle ear can cause conductive hearing loss.  Conductive losses can often be lessened or cured with medication or surgery, depending on the etiology.  Causes of Conductive Hearing Losses:  Wax occlusion of the ear canal  Eardrum perforations  Stiffened or scarred eardrums  Fluid in the middle earspace (otitis media with effusion)  Middle ear infections (acute otitis media)  Scarring or fixation of the ear bones (tympanosclerosis, otosclerosis)  Dislocation of the ear bones  Cholesteatoma: this is a “skin cyst” that develops due to severely retracted eardrums or from skin growth into the middle ear from a chronic eardrum perforation.  Over time the cyst can grow and erode the bones of hearing.  Middle ear tumors or masses  Sensorineural Hearing Loss  Deficits in hearing due to problems in the inner ear or nerves of hearing are termed sensorineural losses.  These are usually due to damage to the microscopic hair cells in the cochlea, or due to loss of the nerve cells in the hearing nerve.  For the most part, this type of hearing loss cannot be repaired with medication or surgery, except in rare instances.  Often times, however, it can be helped with hearing aids and rarely with cochlear implantation.  This type of loss can also be associated with tinnitus (ringing of the ears) and vertigo (dizziness).  Causes of Sensorineural Hearing Losses:  Damage from noise exposure  Aging:  this is often a slow, progressive loss of the high frequencies  Infection (labrynthitis)  Secondary loss from the effects of meningitis  Genetic/familial related hearing loss  Autoimmune hearing loss (a  rheumatoid-like process)  Temporal bond fracture (severe fracture of the bone around or through the inner ear)  Medication induced damage (chemotherapy, high dose IV antibiotics, diuretics)  Inner ear and skull base tumors (acoustic neuroma, memingioma)        Specific Causes    Ear Infections and Effusion in Children  Any time the middle ear is filled with fluid, as in an active infection or in middle ear effusion, there can be a conductive hearing.  Ear infections are the most common cause of infant hearing loss and are a very treatable type of hearing loss.  Usually the middle ear is filled with air, which allows the bones of hearing to freely move.  When there is fluid in this space it slows the vibration and is literally like trying to hear underwater.  Most of the time this infection or fluid can be treated with medication.  If this does not happen, often myringotomy tube placement can allow for drainage of fluid and allow the middle ear space to remain ventilated.  Congenital Hearing Loss  Hearing loss is the most common birth defect, affecting 3 in every 1000 children born in the [place].  If untreated, this can lead to significant problems in speech, language, and learning.  Recently, most hospitals have early infant screening that can identify newborns with hearing problems.  With accurate diagnosis, these children can often be treated with hearing aids and other forms of treatment that can allow them to develop normal speech and learning.  Noise Induced Hearing Loss  According to the National Institutes of Health, approximately 10 million Americans have hearing losses that are a least partially attributable to loud noise exposure.  Exposures that can cause permanent damage vary, but short exposures of very loud quality (gunfire) can be as bad as longer exposures at lower levels.  Most conversations are at about 65-70 decibels.  Levels over 85dB, with exposures of up to 8 hours a day, will produce  permanent hearing loss after many years of exposure.  A stereo headset turned up full blast (about 110 dB) can cause a significant hearing loss in less than a half an hour.  These losses are due to damage of the hair cells of the inner ear from the excessive vibrational acoustic trauma of the loud noise.  Since this loss is usually nonreversible, hearing protection is essential.  Foam earplugs can provide 15-30dB of noise protection.  Age Related Hearing Loss (Presbyacusis)  Age related hearing loss is another very common form of hearing loss in the elderly.  It is usually a slowly progressive, high frequency sensorineural loss that in nonreversible.  Not all elderly people will have hearing loss as this is very much related to the genetic makeup of the individual.  This can cause the patient to withdraw from social situations, or cause them to seem to have a mood change because of the frustration of not being able to hear a conversation or having to ask people to repeat things.  Most of the time, this can be well treated by amplifying sounds with a hearing aid.    Hearing loss can have a large impact in the way we function on a day to day basis with our environment.  We are fortunate that most hearing loss can be treated by hearing aids or medical and surgical treatments.  If you think you may be suffering from hearing loss, an evaluation by our doctor can help identify the cause and the specific treatment that can help improve your hearing.

## 2022-12-05 ENCOUNTER — OFFICE VISIT (OUTPATIENT)
Dept: OBGYN CLINIC | Age: 52
End: 2022-12-05
Payer: COMMERCIAL

## 2022-12-05 VITALS
WEIGHT: 186 LBS | SYSTOLIC BLOOD PRESSURE: 118 MMHG | BODY MASS INDEX: 26.63 KG/M2 | DIASTOLIC BLOOD PRESSURE: 70 MMHG | HEIGHT: 70 IN

## 2022-12-05 DIAGNOSIS — Z01.419 WOMEN'S ANNUAL ROUTINE GYNECOLOGICAL EXAMINATION: Primary | ICD-10-CM

## 2022-12-05 DIAGNOSIS — N32.81 OAB (OVERACTIVE BLADDER): ICD-10-CM

## 2022-12-05 DIAGNOSIS — Z12.72 SCREENING FOR VAGINAL CANCER: ICD-10-CM

## 2022-12-05 DIAGNOSIS — Z12.31 ENCOUNTER FOR SCREENING MAMMOGRAM FOR MALIGNANT NEOPLASM OF BREAST: ICD-10-CM

## 2022-12-05 DIAGNOSIS — Z80.3 FAMILY HISTORY OF BREAST CANCER: ICD-10-CM

## 2022-12-05 DIAGNOSIS — N95.2 VAGINAL ATROPHY: ICD-10-CM

## 2022-12-05 PROCEDURE — 99396 PREV VISIT EST AGE 40-64: CPT | Performed by: NURSE PRACTITIONER

## 2022-12-05 RX ORDER — ESTRADIOL 0.1 MG/G
1 CREAM VAGINAL
Qty: 1 EACH | Refills: 3 | Status: SHIPPED | OUTPATIENT
Start: 2022-12-05

## 2022-12-05 ASSESSMENT — ENCOUNTER SYMPTOMS
CONSTIPATION: 0
EYES NEGATIVE: 1
GASTROINTESTINAL NEGATIVE: 1
RESPIRATORY NEGATIVE: 1
ALLERGIC/IMMUNOLOGIC NEGATIVE: 1
DIARRHEA: 0

## 2022-12-05 NOTE — PROGRESS NOTES
Yazmin Hebert is a 45 y.o. female who presents today for her medical conditions/ complaints as noted below. Yazmin Hebert is c/o of Annual Exam        HPI  Pt presents for annual exam and pap smear. Needs order for screening mammogram. PCP draws labs and manages meds. Myrbetriq was working well at first for incontinence, now symptoms are returning. Has not heard back from PFPT. Going to stop compounds with Dr Lisa Jacobs and start pellet therapy with clinic in [address] [place] [address]. Patient's last menstrual period was 10/02/2020.   [id number]    Past Medical History:   Diagnosis Date    Heavy menses     \"constant\"    Hypothyroidism     PONV (postoperative nausea and vomiting)     RA (rheumatoid arthritis) ([address]     ra     Past Surgical History:   Procedure Laterality Date    BREAST BIOPSY Right 2012    B9    CHOLECYSTECTOMY      10/20/2021    COLONOSCOPY      per pt within last 3 years from [address] [place] [address] N/A 06/19/2020    HYSTEROSCOPY, ENDOMETRIAL ABLATION WITH Thor Larkin, DILATION AND CURETTAGE performed by Dinesh Moser MD at [address] [facility] (CERVIX STATUS UNKNOWN) N/A 10/13/2020    EXAM UNDER ANESTHESIA [facility] W/ YAEL BILATERAL SALPINGO OOPHORECTOMY CYSTOSCOPY performed by Dinesh Moser MD at [facility]      had procedure done similiar to tubal that prevents her from having kids    OVARY REMOVAL Bilateral 10/2020     Family History   Problem Relation Age of Onset    Breast Cancer Maternal Aunt 58        breast    Cancer Maternal Grandfather         colon    Cancer Paternal Grandmother         colon    Cancer Paternal Grandfather         colon    Breast Cancer Other         paternal great aunt-breast    Breast Cancer Sister 50    Breast Cancer Maternal Aunt 64     Social History     Tobacco Use    Smoking status: Never    Smokeless tobacco: Never   Substance Use Topics    Alcohol use: Never       Current Outpatient Medications   Medication Sig Dispense Refill    mirabegron (MYRBETRIQ) 25 MG TB24 Take 1 tablet by mouth daily 90 tablet 3    Progesterone Micronized (PROGESTERONE PO) Take by mouth Indications: 100 mg      NONFORMULARY Indications: 80% estriol, 20% estradiol, .05% testosterone SH       sertraline (ZOLOFT) 100 MG tablet Take 100 mg by mouth daily      ibuprofen (ADVIL;MOTRIN) 800 MG tablet Take 1 tablet by mouth every 8 hours as needed for Pain 90 tablet 0    thyroid (ARMOUR) 60 MG tablet Take 90 mg by mouth daily Indications: Underactive Thyroid       ferrous gluconate (FERGON) 324 (38 Fe) MG tablet Take 324 mg by mouth daily (with breakfast)      VITAMIN D PO Take 5,000 Units by mouth daily Indications: vitamin D 3 5000u       DHEA 10 MG CAPS Take 1 capsule by mouth daily       hydroxychloroquine (PLAQUENIL) 200 MG tablet Take 200 mg by mouth 2 times daily       MAGNESIUM CARBONATE PO Take 1 capsule by mouth daily        No current facility-administered medications for this visit. Allergies   Allergen Reactions    Other Rash     surgiprep (used on her when she had breast biopsy)    Pcn [Penicillins] Rash    Hibiclens [Chlorhexidine Gluconate] Rash     Possible; rash with surgical wash during breast surgery. There were no vitals filed for this visit. Body mass index is 26.69 kg/m². Review of Systems   Constitutional: Negative. HENT: Negative. Eyes: Negative. Respiratory: Negative. Cardiovascular: Negative. Gastrointestinal: Negative. Negative for constipation and diarrhea. Endocrine: Negative. Genitourinary:  Positive for enuresis. Negative for frequency, menstrual problem and urgency. Musculoskeletal: Negative. Skin: Negative. Allergic/Immunologic: Negative. Neurological: Negative. Hematological: Negative. Psychiatric/Behavioral: Negative. All other systems reviewed and are negative. Physical Exam  Vitals and nursing note reviewed.    Constitutional:       Appearance: She is well-developed. HENT:      Head: Normocephalic. Neck:      Thyroid: No thyroid mass or thyromegaly. Cardiovascular:      Rate and Rhythm: Normal rate and regular rhythm. Pulmonary:      Effort: Pulmonary effort is normal.      Breath sounds: Normal breath sounds. Chest:   Breasts:     Right: No inverted nipple, mass, nipple discharge or skin change. Left: No inverted nipple, mass, nipple discharge or skin change. Abdominal:      Palpations: Abdomen is soft. There is no mass. Tenderness: There is no abdominal tenderness. Genitourinary:     General: Normal vulva. Vagina: Normal.      Uterus: Absent. Adnexa:         Right: No mass or tenderness. Left: No mass or tenderness. Comments: Pap collected  Cervix and uterus surgically absent  Vaginal cuff palpates smooth  Musculoskeletal:         General: Normal range of motion. Cervical back: Normal range of motion and neck supple. Skin:     General: Skin is warm and dry. Neurological:      Mental Status: She is alert and oriented to person, place, and time. Psychiatric:         Attention and Perception: Attention normal.         Mood and Affect: Mood normal.         Speech: Speech normal.         Behavior: Behavior normal.         Thought Content: Thought content normal.         Cognition and Memory: Cognition normal.         Judgment: Judgment normal.        Diagnosis Orders   1. Women's annual routine gynecological examination        2. Screening for vaginal cancer  PAP SMEAR    Human papillomavirus (HPV) DNA probe thin prep high risk      3. Encounter for screening mammogram for malignant neoplasm of breast  TERA DIGITAL SCREEN W OR WO CAD BILATERAL      4. Family history of breast cancer        5. Vaginal atrophy            MEDICATIONS:  No orders of the defined types were placed in this encounter.       ORDERS:  Orders Placed This Encounter   Procedures    TERA DIGITAL SCREEN W OR WO CAD BILATERAL    PAP SMEAR Human papillomavirus (HPV) DNA probe thin prep high risk       PLAN:  Pap collected  Ordered screening mammogram  Increasing Myrbetriq- may need referral to Urology  Starting vaginal estrogen for incontinence since switching to pellets- pt agreeable      Patient Instructions   Patient Education       Breast Self-Exam: Care Instructions  Your Care Instructions     A breast self-exam is when you check your breasts for lumps or changes. This regular exam helps you learn how your breasts normally look and feel. Most breast problems or changes are not because of cancer. Breast self-exam is not a substitute for a mammogram. Having regular breast exams by your doctor and regular mammograms improve your chances of finding any problems with your breasts. Some women set a time each month to do a step-by-step breast self-exam. Other women like a less formal system. They might look at their breasts as they brush their teeth, or feel their breasts once in a while in the shower. If you notice a change in your breast, tell your doctor. Follow-up care is a key part of your treatment and safety. Be sure to make and go to all appointments, and call your doctor if you are having problems. It's also a good idea to know your test results and keep a list of the medicines you take. How do you do a breast self-exam?  The best time to examine your breasts is usually one week after your menstrual period begins. Your breasts should not be tender then. If you do not have periods, you might do your exam on a day of the month that is easy to remember. To examine your breasts:  Remove all your clothes above the waist and lie down. When you are lying down, your breast tissue spreads evenly over your chest wall, which makes it easier to feel all your breast tissue. Use the pads--not the fingertips--of the 3 middle fingers of your left hand to check your right breast. Move your fingers slowly in small coin-sized circles that overlap.   Use three levels of pressure to feel of all your breast tissue. Use light pressure to feel the tissue close to the skin surface. Use medium pressure to feel a little deeper. Use firm pressure to feel your tissue close to your breastbone and ribs. Use each pressure level to feel your breast tissue before moving on to the next spot. Check your entire breast, moving up and down as if following a strip from the collarbone to the bra line, and from the armpit to the ribs. Repeat until you have covered the entire breast.  Repeat this procedure for your left breast, using the pads of the 3 middle fingers of your right hand. To examine your breasts while in the shower:  Place one arm over your head and lightly soap your breast on that side. Using the pads of your fingers, gently move your hand over your breast (in the strip pattern described above), feeling carefully for any lumps or changes. Repeat for the other breast.  Have your doctor inspect anything you notice to see if you need further testing. Where can you learn more? Go to https://RF Code.Earn and Play. org and sign in to your deeplocal account. Enter P148 in the [facility] box to learn more about \"Breast Self-Exam: Care Instructions. \"     If you do not have an account, please click on the \"Sign Up Now\" link. Current as of: November 22, 2021               Content Version: 13.4  © 1130-7456 Healthwise, Incorporated. Care instructions adapted under license by [facility] ([facility]). If you have questions about a medical condition or this instruction, always ask your healthcare professional. [place] [address] any warranty or liability for your use of this information.

## 2022-12-05 NOTE — PROGRESS NOTES
Pt presents today for pap smear and breast exam. She states she is wanting to discuss her incontinent issues. Mammo:  Pap smear:  Contraception:hyst    P:  Ab:   Bone density:per pt this yr or last yr   Colonoscopy:per pt last 3 yrs

## 2022-12-05 NOTE — PATIENT INSTRUCTIONS
Patient Education        Breast Self-Exam: Care Instructions  Your Care Instructions     A breast self-exam is when you check your breasts for lumps or changes. This regular exam helps you learn how your breasts normally look and feel. Most breast problems or changes are not because of cancer. Breast self-exam is not a substitute for a mammogram. Having regular breast exams by your doctor and regular mammograms improve your chances of finding any problems with your breasts. Some women set a time each month to do a step-by-step breast self-exam. Other women like a less formal system. They might look at their breasts as they brush their teeth, or feel their breasts once in a while in the shower. If you notice a change in your breast, tell your doctor. Follow-up care is a key part of your treatment and safety. Be sure to make and go to all appointments, and call your doctor if you are having problems. It's also a good idea to know your test results and keep a list of the medicines you take. How do you do a breast self-exam?  The best time to examine your breasts is usually one week after your menstrual period begins. Your breasts should not be tender then. If you do not have periods, you might do your exam on a day of the month that is easy to remember. To examine your breasts:  Remove all your clothes above the waist and lie down. When you are lying down, your breast tissue spreads evenly over your chest wall, which makes it easier to feel all your breast tissue. Use the pads--not the fingertips--of the 3 middle fingers of your left hand to check your right breast. Move your fingers slowly in small coin-sized circles that overlap. Use three levels of pressure to feel of all your breast tissue. Use light pressure to feel the tissue close to the skin surface. Use medium pressure to feel a little deeper. Use firm pressure to feel your tissue close to your breastbone and ribs.  Use each pressure level to feel your breast tissue before moving on to the next spot. Check your entire breast, moving up and down as if following a strip from the collarbone to the bra line, and from the armpit to the ribs. Repeat until you have covered the entire breast.  Repeat this procedure for your left breast, using the pads of the 3 middle fingers of your right hand. To examine your breasts while in the shower:  Place one arm over your head and lightly soap your breast on that side. Using the pads of your fingers, gently move your hand over your breast (in the strip pattern described above), feeling carefully for any lumps or changes. Repeat for the other breast.  Have your doctor inspect anything you notice to see if you need further testing. Where can you learn more? Go to https://Sellywhere.logolineup. org and sign in to your Sermo account. Enter P148 in the [employer] box to learn more about \"Breast Self-Exam: Care Instructions. \"     If you do not have an account, please click on the \"Sign Up Now\" link. Current as of: November 22, 2021               Content Version: 13.4  © 2006-2022 Healthwise, Incorporated. Care instructions adapted under license by [facility] [address] If you have questions about a medical condition or this instruction, always ask your healthcare professional. Eda Gallego any warranty or liability for your use of this information.

## 2023-01-04 ENCOUNTER — HOSPITAL ENCOUNTER (OUTPATIENT)
Dept: PHYSICAL THERAPY | Age: 53
Setting detail: THERAPIES SERIES
Discharge: HOME | End: 2023-01-04
Payer: COMMERCIAL

## 2023-01-04 PROCEDURE — 97110 THERAPEUTIC EXERCISES: CPT

## 2023-01-04 PROCEDURE — 97163 PT EVAL HIGH COMPLEX 45 MIN: CPT

## 2023-01-04 NOTE — PROGRESS NOTES
Physical Therapy: Initial Evaluation    Patient: Samantha Doyle (25 y.o. female)   Examination Date:   Plan of Care Certification [facility]:3452 to 23      :  1970 ;    Confirmed: Yes MRN: [id number]  CSN: [id number]   Insurance: Payor: [facility] / Plan: BCBS - [place] PPO / Product Type: *No Product type* /   Insurance ID: [id number] - ([place] [employer]BS) Secondary Insurance (if applicable):    Referring Physician: Pollie Libman, APRN - CNP Pollie Libman APRN   PCP: Rock Fowler MD Visits to Date/Visits Approved:     No Show/Cancelled Appts:   /       Medical Diagnosis: Overactive bladder [N32.81]  Urge incontinence [N39.41] overactive bladder N32.81, urge incontinence N39.41  Treatment Diagnosis: urge incontinence, weakness     PERTINENT MEDICAL HISTORY   Patient Assessed for Rehabilitation Services: Yes       Medical History: Chart Reviewed: Yes   Past Medical History:   Diagnosis Date    Heavy menses     \"constant\"    Hypothyroidism     PONV (postoperative nausea and vomiting)     RA (rheumatoid arthritis) ([facility] [address] [address]     ra     Surgical History:  Past Surgical History:   Procedure Laterality Date    BREAST BIOPSY Right     B9    CHOLECYSTECTOMY      10/20/2021    COLONOSCOPY      per pt within last 3 years from [address] [place] [address] N/A 2020    HYSTEROSCOPY, ENDOMETRIAL ABLATION WITH Nelwyn Fab, DILATION AND CURETTAGE performed by Perla Huynh MD at [address] [place] [address] (CERVIX STATUS UNKNOWN) N/A 10/13/2020    EXAM UNDER ANESTHESIA [facility] W/ DAVINCI BILATERAL SALPINGO OOPHORECTOMY CYSTOSCOPY performed by Perla Huynh MD at [address]      had procedure done similiar to tubal that prevents her from having kids    OVARY REMOVAL Bilateral 10/2020       Medications:   Current Outpatient Medications:     mirabegron (MYRBETRIQ) 50 MG TB24, Take 50 mg by mouth daily, Disp: 30 tablet, Rfl: 3    estradiol (ESTRACE VAGINAL) 0.1 MG/GM vaginal cream, Place 1 g vaginally three times a week, Disp: 1 each, Rfl: 3    Progesterone Micronized (PROGESTERONE PO), Take by mouth Indications: 100 mg, Disp: , Rfl:     NONFORMULARY, Indications: 80% estriol, 20% estradiol, .05% testosterone SH , Disp: , Rfl:     sertraline (ZOLOFT) 100 MG tablet, Take 100 mg by mouth daily, Disp: , Rfl:     ibuprofen (ADVIL;MOTRIN) 800 MG tablet, Take 1 tablet by mouth every 8 hours as needed for Pain, Disp: 90 tablet, Rfl: 0    thyroid (ARMOUR) 60 MG tablet, Take 90 mg by mouth daily Indications: Underactive Thyroid , Disp: , Rfl:     ferrous gluconate (FERGON) 324 (38 Fe) MG tablet, Take 324 mg by mouth daily (with breakfast), Disp: , Rfl:     VITAMIN D PO, Take 5,000 Units by mouth daily Indications: vitamin D 3 5000u , Disp: , Rfl:     DHEA 10 MG CAPS, Take 1 capsule by mouth daily , Disp: , Rfl:     hydroxychloroquine (PLAQUENIL) 200 MG tablet, Take 200 mg by mouth 2 times daily , Disp: , Rfl:     MAGNESIUM CARBONATE PO, Take 1 capsule by mouth daily , Disp: , Rfl:   Allergies: Other, Pcn [penicillins], and Hibiclens [chlorhexidine gluconate]       SUBJECTIVE EXAMINATION     History obtained from[de-identified] Patient, Chart Review,      Family/Caregiver Present: No    Subjective History: Onset Date: 01/04/23  Subjective  Urology History: has had UTI's, but reports they are not regularly occurring  Gynecology History: recent pap in December 2022, hysterectomy 2020  GI History: none  Urinary Issues: Yes  Urinary Frequency: Yes  Urinary Urgency: Yes  Urge Incontinence: Yes  Voiding Frequency Day: 10-15  Voiding Frequency Night: 1  Leakage: Yes  Incontinence Protection: Yes  Stream Assessment:  (says she may have post void dribble)  Bowel Issues: No  Pain Present: No  4 week or greater of failed trial of PFPT program?: No  Second person requested for examination?: No  Subjective: Patient states she has been having urinary leakage when coming to standing or when going to the bathroom. She says that prior to starting medication for this she would have a \"gush\" of liquid but now it is more of a dribble. She says she has frequent trips to the bathroom as well. She reports that she has no leakage with coughing or sneezing type activities. She says that she she goes to the bathroom prior to going places and knows where bathrooms are when she is out because of frequency to need them. She says that she does not have any vaginal pain.                  Functional Status         Social History:  Social History  Lives With: Spouse    Occupation/Interests:  Occupation: Full time employment  Type of Occupation: [employer] in [address] Duties: Prolonged sitting    Prior Level of Function:    Independent        Current Level of Function:         ADL Assistance: Independent  Homemaking Assistance: Independent  Active : Yes (has to stop frequently on trips to use bathroom)    OBJECTIVE EXAMINATION                Review of Systems:  Overall Orientation Status: Within Normal Limits    Ortho Screen:  Posture: slight anterior pelvic tilt  Pelvic Alignment: left asis anteriorly rotated, corrected with left knee push isometric into hip ext  Hip Mobility/Strength: 5/5 strength throughout bilateral le's, hs 90-90 right 145, left 144     Neuro Screen:  Sensation (B) LEs: Intact to light touch     Abdominals:  Diastasis: No  Functional Stability: good load and shift with sls, fair lumbopelvic stabilization in hooklying with and with ue/le activity     Pelvic Floor External Observations:  Skin Condition: Normal  Sensation: Intact to light touch  Palpation: No point tenderness         Pelvic Floor Internal Examination (if applicable):   Type of exam Performed: Internal Vaginal exam  Sensation: Intact  Palpation: no tenderness to palpation, no increased muscular tension noted bilaterally to pfm's  Power: 2/5  Endurance: 7  Repetitions: 7  Fast: 6     Pelvic Organ Prolapse (if applicable):   Vaginal: Cystocele, Rectocele (grade 1)         Outcome Measure(s) Completed:  PFIQ-7 39.68% impairment      ASSESSMENT     Impression: Assessment: Patient has decreased pfm strength and lumbopelvic stabilization with regular occurrance of urinary incontinence. She will benefit from skilled PT to increase strength and function for decreased occurrance of urinary incontinence. Body Structures, Functions, Activity Limitations Requiring Skilled Therapeutic Intervention: Decreased ADL status, Decreased strength, Decreased ROM, Decreased endurance    Statement of Medical Necessity: Physical Therapy is both indicated and medically necessary as outlined in the POC to increase the likelihood of meeting the functionally related goals stated below.      Patient's rehabilitation potential/prognosis is considered to be: Good          GOALS     Patient Goal(s): decrease urine leakage  Short Term Goals Completed by 6 Weeks Goal Status   Patient PERF score 2/7/7/6     Patient to have good lumbopelvic stabilization in hooklying alone     Patient to report decreased urinary incontinence by 50%     Patient to be independent with HEP                                             Long Term Goals Completed by 12 Weeks Goal Status   Patient to report decreased occurrance of going to urinate prior to leaving house     Patient to have good lumbopelvic stabilization in hooklying with alt ue/le activity     Patient to report decreased occurrance by 75%     Patient to demo increased function by scoring <= 15% impairment on PFIQ-7                                              TREATMENT PLAN            Pt. actively involved in establishing Plan of Care and Goals: Yes  Patient/ Caregiver education and instruction:      biofeedback        Treatment may include any combination of the following: Current Treatment Recommendations: Strengthening, ROM, Endurance training, Modalities, Neuromuscular re-education, Safety education & training, Home exercise program Frequency / Duration:  Patient to be seen 2x/week for 12 weeks weeks      Eval Complexity:    Decision Making: High Complexity  History: Personal Factors and/or Comorbidities Impacting POC: High  History: hysterectomy , child birth x 3, ra  Examination of body system(s) including body structures and functions, activity limitations, and/or participation restrictions: High  Exam: pfm weakness, lumbopelvic stabilization decreased, urge incontinence  Clinical Presentation: High    PT Treatment Completed:  Exercises:      Treatment Reasoning    Exercise 1: supine legs up wall  Exercise 2: supine left knee push isometric into hip ext 5 sec x 5  Exercise 3: supine ta contraction alone 5 sec x 5, with ue's 1 x 5, with le's 1 x 5, with ue's and le's 1 x 5  Exercise 4: supine ta contraction with bridge 1 x 10  Exercise 5: supine posterior pelvic tilt 1 x 10  Exercise 6: supine hs 90-90 neutral position manual stretch 4 x 15 sec and piriformis >90 manual stretch 4 x 15 sec  Exercise 7: sit to stand with ta contraction  Exercise 8: seated on t-ball ta contraction alone, alt ue, alt le, alt ue/le  Exercise 9: supine in slight trendelenburg pfm downtraining on biofeedback  Exercise 10: supine in slight trendelenburg pfm contraction on biofeedback alone, with towel for hip add, with t-band for hip abd *progress to upright as able                          Therapy Time  Individual Time In: 0916       Individual Time Out: 1020  Minutes: 64  Timed Code Treatment Minutes: 64 Minutes       Therapist Signature: Radha Curiel PT    Date: 7/9/0009     I certify that the above Therapy Services are being furnished while the patient is under my care. I agree with the treatment plan and certify that this therapy is necessary.       Physician's Signature:  ___________________________   Date:_______                                                                   RAVEN Ortiz - CNP        Physician Comments: _______________________________________________    Please sign and return to [facility]YS[facility]ER[facility]. Please fax to the location listed below.  Nilam Jean Baptiste for this referral!    [address] [facility] PHYSICAL THERAPY  [address] [address]  Dept: [phone]  Dept Fax: Georgie Laura: [phone]       POC NOTE

## 2023-01-06 ENCOUNTER — HOSPITAL ENCOUNTER (OUTPATIENT)
Dept: PHYSICAL THERAPY | Age: 53
Setting detail: THERAPIES SERIES
Discharge: HOME | End: 2023-01-06
Payer: COMMERCIAL

## 2023-01-06 NOTE — PROGRESS NOTES
Physical Therapy: Daily Note   Patient: Matteo Curry (39 y.o. female)   Examination Date:   Plan of Care/Certification Expiration Date: 23    No data recorded   :  1970 # of Visits since [facility]:   2   MRN: [id number]  CSN: [id number] Start of Care Date:   2023   Insurance: Payor: Lizzie Ram / Plan: BCBS - [place] PPO / Product Type: *No Product type* /   Insurance ID: [id number] - ([place] BCBS) Secondary Insurance (if applicable):    Referring Physician: RAVEN Mendiola - CNP Dieter CARBAJAL   PCP: Cherie Chawla MD Visits to Date/Visits Approved:     No Show/Cancelled Appts:   /       Medical Diagnosis: Overactive bladder [N32.81]  Urge incontinence [N39.41] overactive bladder N32.81, urge incontinence N39.41  Treatment Diagnosis: urge incontinence, weakness        SUBJECTIVE EXAMINATION   Pain Level: Pain Screening  Patient Currently in Pain: No    Patient Comments: Subjective: I am doing good today. I havent done my bladder diary yet.             OBJECTIVE EXAMINATION       TREATMENT     Exercises:      Treatment Reasoning    Exercise 1: supine legs up wall x 3 mins  Exercise 2: supine left knee push isometric into hip ext 5 sec x 5  Exercise 3: supine ta contraction alone 10 sec x 5, with ue's 1 x 10, with le's 1 x 10, with ue's and le's 1 x 10  Exercise 4: supine ta contraction with bridge 1 x 10  Exercise 5: supine posterior pelvic tilt 1 x 10  Exercise 6: supine hs 90-90 neutral position manual stretch 4 x 15 sec and piriformis >90 manual stretch 4 x 15 sec  Exercise 7: sit to stand with ta contraction x 10  Exercise 8: seated on t-ball ta contraction alone 5 x 5 sec, alt ue 1 x 10, alt le 1 x 10 , alt ue/le 1 x 5  Exercise 9: supine in slight trendelenburg pfm downtraining on biofeedback x 3 mins  Exercise 10: supine in slight trendelenburg pfm contraction on biofeedback alone 10 x 10 sec, with towel for hip add 10 x 10 sec, with t-band for hip abd 10 x 10 sec *progress to upright as able                               ASSESSMENT     Assessment: Assessment: Initiated exercise and biofeedback per primary therapists recommendations. Patient did well with session. Initial HEP issued. Patient demonstrated all and verbalized understanding. She required mod verbal and tactile cueing to improve quality of exercise. With downtraining, patient able to relax down to levels 1-4. With PFM contractions, she was able to achieve levels 8-20. Anni Doss sat in quickly. ONly 25 kegels performed today. No pain pain reported pre or post session.   Body Structures, Functions, Activity Limitations Requiring Skilled Therapeutic Intervention: Decreased ADL status, Decreased strength, Decreased ROM, Decreased endurance    Post-Treatment Pain Level:      No data recorded  Therapy Prognosis: Good       GOALS   Patient Goals : decrease urine leakage  Short Term Goals Completed by 6 Weeks Current Status Goal Status   Patient PERF score 2/7/7/6       Patient to have good lumbopelvic stabilization in hooklying alone       Patient to report decreased urinary incontinence by 50%       Patient to be independent with HEP                                                           Long Term Goals Completed by 12 Weeks Current Status Goal Status   Patient to report decreased occurrance of going to urinate prior to leaving house       Patient to have good lumbopelvic stabilization in hooklying with alt ue/le activity       Patient to report decreased occurrance by 75%       Patient to demo increased function by scoring <= 15% impairment on PFIQ-7                                                            TREATMENT PLAN   Plan Frequency: 2x/week  Plan weeks: 12 weeks  Current Treatment Recommendations: Strengthening, ROM, Endurance training, Modalities, Neuromuscular re-education, Safety education & training, Home exercise program   Additional Comments: biofeedback       Therapy Time  Individual Time In:         Individual Time Out:    Minutes:          Electronically signed by Jesika Mcwillimas PTA  on 1/6/2023 at 3:54 PM   POC NOTE  Electronically signed by Jesika Mcwilliams PTA on 1/6/2023 at 3:55 PM

## 2023-01-10 ENCOUNTER — HOSPITAL ENCOUNTER (OUTPATIENT)
Dept: PHYSICAL THERAPY | Age: 53
Setting detail: THERAPIES SERIES
Discharge: HOME | End: 2023-01-10
Payer: COMMERCIAL

## 2023-01-10 PROCEDURE — 90912 BFB TRAINING 1ST 15 MIN: CPT

## 2023-01-10 PROCEDURE — 97110 THERAPEUTIC EXERCISES: CPT

## 2023-01-10 NOTE — PROGRESS NOTES
Physical Therapy: Daily Note   Patient: Fernando Licona (83 y.o. female)   Examination Date:   Plan of Care/Certification Expiration Date: 23    No data recorded   :  1970 # of Visits since [facility]:   3   MRN: [id number]  CSN: [id number] Start of Care Date:   2023   Insurance: Payor: Zaida Romero / Plan: BCBS - [place] PPO / Product Type: *No Product type* /   Insurance ID: [id number] - ([place] BCBS) Secondary Insurance (if applicable):    Referring Physician: RAVEN Valdivia - CNP Kenzie CARBAJAL   PCP: Sim Spain MD Visits to Date/Visits Approved: 3 / 20    No Show/Cancelled Appts:   /       Medical Diagnosis: Overactive bladder [N32.81]  Urge incontinence [N39.41] overactive bladder N32.81, urge incontinence N39.41  Treatment Diagnosis: urge incontinence, weakness        SUBJECTIVE EXAMINATION   Pain Level:      Patient Comments: Subjective: Still havent completed bladder diary. But I am doing okay.           OBJECTIVE EXAMINATION       TREATMENT     Exercises:      Treatment Reasoning    Exercise 1: supine legs up wall x 3 mins  Exercise 2: supine left knee push isometric into hip ext 5 sec x 5  Exercise 3: supine ta contraction alone 10 sec x 10, with ue's 1 x 10, with le's 1 x 10, with ue's and le's 1 x 10  Exercise 4: supine ta contraction with bridge 1 x 10  Exercise 5: supine posterior pelvic tilt 1 x 10  Exercise 6: supine hs 90-90 neutral position manual stretch 4 x 15 sec and piriformis >90 manual stretch 4 x 15 sec  Exercise 7: sit to stand with ta contraction x 10  Exercise 8: seated on t-ball ta contraction alone 5 x 5 sec, alt ue 1 x 10, alt le 1 x 10 , alt ue/le 1 x 5  Exercise 9: supine in slight trendelenburg pfm downtraining on biofeedback x 3 mins  Exercise 10: supine in slight trendelenburg pfm contraction on biofeedback alone 10 x 10 sec, with towel for hip add 10 x 10 sec, with t-band for hip abd 10 x 10 sec *progress to upright as able                          Pt Education: Additional Comments: biofeedback       ASSESSMENT     Assessment: Assessment: Patient  did well with session. She relates she has been working on ex at home. Also reports doing 10 kegels per day at home. She has not completed her bladder dairy yet. Downtraining on biofeedback, patient achieved levels 1-4. With PFM contractions, she was able to achieve levels 8-15 without accessory muscles. She achieved levels 10-20 with accessory muscles. She reported no pain pre or post session.   Body Structures, Functions, Activity Limitations Requiring Skilled Therapeutic Intervention: Decreased ADL status, Decreased strength, Decreased ROM, Decreased endurance    Post-Treatment Pain Level:      No data recorded  Therapy Prognosis: Good       GOALS   Patient Goals : decrease urine leakage  Short Term Goals Completed by 6 Weeks Current Status Goal Status   Patient PERF score 2/7/7/6       Patient to have good lumbopelvic stabilization in hooklying alone       Patient to report decreased urinary incontinence by 50%       Patient to be independent with HEP                                                           Long Term Goals Completed by 12 Weeks Current Status Goal Status   Patient to report decreased occurrance of going to urinate prior to leaving house       Patient to have good lumbopelvic stabilization in hooklying with alt ue/le activity       Patient to report decreased occurrance by 75%       Patient to demo increased function by scoring <= 15% impairment on PFIQ-7                                                            TREATMENT PLAN   Plan Frequency: 2x/week  Plan weeks: 12 weeks  Current Treatment Recommendations: Strengthening, ROM, Endurance training, Modalities, Neuromuscular re-education, Safety education & training, Home exercise program   Additional Comments: biofeedback       Therapy Time  Individual Time In: 1102       Individual Time Out: 1202  Minutes: 60  Timed Code Treatment Minutes: 61 Minutes     Electronically signed by Clary Pierre PTA  on 1/10/2023 at 1:04 PM   POC NOTE  Electronically signed by Clary Pierre PTA on 1/10/2023 at 1:05 PM

## 2023-01-12 ENCOUNTER — HOSPITAL ENCOUNTER (OUTPATIENT)
Dept: PHYSICAL THERAPY | Age: 53
Setting detail: THERAPIES SERIES
Discharge: HOME | End: 2023-01-12
Payer: COMMERCIAL

## 2023-01-12 PROCEDURE — 97110 THERAPEUTIC EXERCISES: CPT

## 2023-01-12 NOTE — PROGRESS NOTES
Physical Therapy: Daily Note   Patient: Corina Arcos (42 y.o. female)   Examination Date:   Plan of Care/Certification Expiration Date: 23    No data recorded   :  1970 # of Visits since [facility]:   4   MRN: [id number]  CSN: [id number] Start of Care Date:   2023   Insurance: Payor: Monique Daily / Plan: BCBS - [place] PPO / Product Type: *No Product type* /   Insurance ID: [id number] - ([place] BCBS) Secondary Insurance (if applicable):    Referring Physician: Keon Chavis, APRN - CNP Keon Chavis APRN   PCP: Jhony Junior MD Visits to Date/Visits Approved:     No Show/Cancelled Appts:   /       Medical Diagnosis: Overactive bladder [N32.81]  Urge incontinence [N39.41] overactive bladder N32.81, urge incontinence N39.41  Treatment Diagnosis: urge incontinence, weakness        SUBJECTIVE EXAMINATION   Pain Level: Pain Screening  Patient Currently in Pain: No    Patient Comments: Subjective: I brought the first day of my bladder diary today. I have been doing the exercises.     HEP Compliance: Good        OBJECTIVE EXAMINATION       TREATMENT     Exercises:      Treatment Reasoning    Exercise 1: supine legs up wall x 3 mins  Exercise 2: supine left knee push isometric into hip ext 5 sec x 5  Exercise 3: supine ta contraction alone 10 sec x 10, with ue's 1 x 10, with le's 1 x 10, with ue's and le's 1 x 10  Exercise 4: supine ta contraction with bridge 1 x 10  Exercise 5: supine posterior pelvic tilt 1 x 10  Exercise 6: supine hs 90-90 neutral position manual stretch 4 x 15 sec and piriformis >90 manual stretch 4 x 15 sec  Exercise 7: sit to stand with ta contraction x 10  Exercise 8: seated on t-ball ta contraction alone 10 x 10 sec, alt ue 1 x 10, alt le 1 x 10 , alt ue/le 1 x 5  Exercise 9: supine in slight trendelenburg pfm downtraining on biofeedback x 3 mins  Exercise 10: supine in slight trendelenburg pfm contraction on biofeedback alone 10 x 10 sec, with towel for hip add 10 x 10 sec, with t-band for hip abd 10 x 10 sec *progress to upright as able                                            1/12/2023-Bladder diary reviewed and foods tip sheet issued. ASSESSMENT     Assessment: Assessment: Patient did well with session. She was instructed to increase to 20 kegels per day at home. She brought in her 1st page of her bladder diary. It was reviewed and foods tip sheet given. Patient very upset that green tea was on the list.  Patient stated she wasnt giving up her green tea. Patient encouraged to refer to list when she had a bad frequency/urgency day to see if any replacements available. She acknowledged understanding. She reported no pain pre or post session.   Body Structures, Functions, Activity Limitations Requiring Skilled Therapeutic Intervention: Decreased ADL status, Decreased strength, Decreased ROM, Decreased endurance    Post-Treatment Pain Level:      No data recorded  Therapy Prognosis: Good       GOALS   Patient Goals : decrease urine leakage  Short Term Goals Completed by 6 Weeks Current Status Goal Status   Patient PERF score 2/7/7/6       Patient to have good lumbopelvic stabilization in hooklying alone       Patient to report decreased urinary incontinence by 50%       Patient to be independent with HEP                                                           Long Term Goals Completed by 12 Weeks Current Status Goal Status   Patient to report decreased occurrance of going to urinate prior to leaving house       Patient to have good lumbopelvic stabilization in hooklying with alt ue/le activity       Patient to report decreased occurrance by 75%       Patient to demo increased function by scoring <= 15% impairment on PFIQ-7                                                            TREATMENT PLAN   Plan Frequency: 2x/week  Plan weeks: 12 weeks  Current Treatment Recommendations: Strengthening, ROM, Endurance training, Modalities, Neuromuscular re-education, Safety education & training, Home exercise program   Additional Comments: biofeedback       Therapy Time  Individual Time In: 1100       Individual Time Out: 1200  Minutes: 60  Timed Code Treatment Minutes: 60 Minutes     Electronically signed by Fritz Poole PTA  on 1/12/2023 at 4:30 PM   POC NOTE  Electronically signed by Fritz Poole PTA on 1/12/2023 at 4:30 PM  Electronically signed by Fritz Poole PTA on 1/12/2023 at 4:30 PM

## 2023-01-17 ENCOUNTER — HOSPITAL ENCOUNTER (OUTPATIENT)
Dept: PHYSICAL THERAPY | Age: 53
Setting detail: THERAPIES SERIES
Discharge: HOME | End: 2023-01-17
Payer: COMMERCIAL

## 2023-01-17 PROCEDURE — 97110 THERAPEUTIC EXERCISES: CPT

## 2023-01-17 NOTE — PROGRESS NOTES
Physical Therapy: Daily Note   Patient: Fay Montgomery (89 y.o. female)   Examination Date:   Plan of Care/Certification Expiration Date: 23    No data recorded   :  1970 # of Visits since [facility]:   5   MRN: [id number]  CSN: [id number] Start of Care Date:   2023   Insurance: Payor: Wilfredo Guadalupe / Plan: BCBS - [place] PPO / Product Type: *No Product type* /   Insurance ID: [id number] - ([place] BCBS) Secondary Insurance (if applicable):    Referring Physician: RAVEN Reynolds - CAREY CARBAJAL   PCP: Faustina Dacosta MD Visits to Date/Visits Approved:     No Show/Cancelled Appts:   /       Medical Diagnosis: Overactive bladder [N32.81]  Urge incontinence [N39.41] overactive bladder N32.81, urge incontinence N39.41  Treatment Diagnosis: urge incontinence, weakness        SUBJECTIVE EXAMINATION   Pain Level: Pain Screening  Patient Currently in Pain: No    Patient Comments: Subjective: I am feeling okay. I did not do the second day of bladder diary, i forgot.   I am doing the exercises at home    HEP Compliance: Good        OBJECTIVE EXAMINATION       TREATMENT     Exercises:      Treatment Reasoning    Exercise 1: supine legs up wall x 3 mins  Exercise 2: supine left knee push isometric into hip ext 5 sec x 5--  Exercise 3: supine ta contraction alone 10 sec x 10, with ue's 1 x 10, with le's 1 x 10, with ue's and le's 1 x 10  Exercise 4: supine ta contraction with bridge 1 x 10  Exercise 5: supine posterior pelvic tilt 1 x 10  Exercise 6: supine hs 90-90 neutral position manual stretch 4 x 15 sec and piriformis >90 manual stretch 4 x 15 sec  Exercise 7: sit to stand with ta contraction x 10  Exercise 8: seated on t-ball ta contraction alone 10 x 10 sec, alt ue 1 x 10, alt le 1 x 10 , alt ue/le 1 x 5  Exercise 9: supine in slight trendelenburg pfm downtraining on biofeedback x 3 mins  Exercise 10: supine in slight trendelenburg pfm contraction on biofeedback alone 0 x 10 sec, with towel for hip add 0  x 10 sec, with t-band for hip abd 0 x 10 sec   supine with legs stragiht 6 x 10 sec, supine in hooklying (no wedge) 10 x 10 sec, supine in hooklying with towel for hip add (no wedge) 10 x 10 sec, supine in hooklying with t-band for hip abd (no wedge) 10 x 10 sec progress to upright as able                                            1/12/2023-Bladder diary reviewed and foods tip sheet issued. ASSESSMENT     Assessment: Assessment: Patient did well with session. Dhe is encouraged to increase kegels to 30-40 per day. Having her add theraband kegels and supine with leg straight kegels. She relates she is exercising regularly. Today was able to increase to 36 kegels and all without use of wedge. She relaxed down to levels 2-3 with downtraining on biofeedback. She achieved levels 6-15 with legs straight, levels 10-20 with hooklying no wedge, levels 15-30 with accessory muscles and no wedge.   Body Structures, Functions, Activity Limitations Requiring Skilled Therapeutic Intervention: Decreased ADL status, Decreased strength, Decreased ROM, Decreased endurance    Post-Treatment Pain Level:      No data recorded  Therapy Prognosis: Good       GOALS   Patient Goals : decrease urine leakage  Short Term Goals Completed by 6 Weeks Current Status Goal Status   Patient PERF score 2/7/7/6       Patient to have good lumbopelvic stabilization in hooklying alone       Patient to report decreased urinary incontinence by 50%       Patient to be independent with HEP                                                           Long Term Goals Completed by 12 Weeks Current Status Goal Status   Patient to report decreased occurrance of going to urinate prior to leaving house       Patient to have good lumbopelvic stabilization in hooklying with alt ue/le activity       Patient to report decreased occurrance by 75%       Patient to demo increased function by scoring <= 15% impairment on PFIQ-7 TREATMENT PLAN   Plan Frequency: 2x/week  Plan weeks: 12 weeks  Current Treatment Recommendations: Strengthening, ROM, Endurance training, Modalities, Neuromuscular re-education, Safety education & training, Home exercise program   Additional Comments: biofeedback       Therapy Time  Individual Time In: 1100       Individual Time Out: 4956  Minutes: 56  Timed Code Treatment Minutes: 56 Minutes     Electronically signed by Maki Jimenes PTA  on 1/17/2023 at 1:38 PM   POC NOTE  Electronically signed by Maki Jimenes PTA on 1/17/2023 at 1:39 PM

## 2023-01-19 ENCOUNTER — HOSPITAL ENCOUNTER (OUTPATIENT)
Dept: PHYSICAL THERAPY | Age: 53
Setting detail: THERAPIES SERIES
Discharge: HOME | End: 2023-01-19
Payer: COMMERCIAL

## 2023-01-19 PROCEDURE — 97110 THERAPEUTIC EXERCISES: CPT

## 2023-01-19 NOTE — PROGRESS NOTES
Physical Therapy: Daily Note   Patient: Andra Shelton (5 y.o. female)   Examination Date:   Plan of Care/Certification Expiration Date: 23    No data recorded   :  1970 # of Visits since [facility]:   6   MRN: [id number]  CSN: [id number] Start of Care Date:   2023   Insurance: Payor: [place] Maldonadoet / Plan: BCBS - [place] PPO / Product Type: *No Product type* /   Insurance ID: [id number] - ([place] BCBS) Secondary Insurance (if applicable):    Referring Physician: RAVEN Her - CNP Ernestine CARBAJAL   PCP: Yuliya Lopez MD Visits to Date/Visits Approved:     No Show/Cancelled Appts:   /       Medical Diagnosis: Overactive bladder [N32.81]  Urge incontinence [N39.41] overactive bladder N32.81, urge incontinence N39.41  Treatment Diagnosis: urge incontinence, weakness        SUBJECTIVE EXAMINATION   Pain Level: Pain Screening  Patient Currently in Pain: No    Patient Comments: Subjective: Doing good. I can tell I am having less leakage. I still wear a pantiliner and by end of day it is slightly wet but I am unaware of when the leakage occurred. I am using the quick flicks and that seems to buy me time. And now I know tea is a big irritant for me.     HEP Compliance: Good        OBJECTIVE EXAMINATION       TREATMENT     Exercises:      Treatment Reasoning    Exercise 1: supine legs up wall x 3 mins  Exercise 2: supine left knee push isometric into hip ext 5 sec x 5--leg length equal today  Exercise 3: supine ta contraction alone 10 sec x 10, with ue's 1 x 10, with le's 1 x 10, with ue's and le's 1 x 10  Exercise 4: supine ta contraction with bridge 1 x 10  Exercise 5: supine posterior pelvic tilt 1 x 10  Exercise 6: supine hs 90-90 neutral position manual stretch 4 x 15 sec and piriformis >90 manual stretch 4 x 15 sec--hamstring stretch in long sitting position today  Exercise 7: sit to stand with ta contraction x 10  Exercise 8: seated on t-ball ta contraction alone 10 x 10 sec, alt ue 1 x 10, alt le 1 x 10 , alt ue/le 1 x 10  Exercise 9: supine in slight trendelenburg pfm downtraining on biofeedback x 3 mins  Exercise 10: supine in slight trendelenburg pfm contraction on biofeedback alone 0 x 10 sec, with towel for hip add 0  x 10 sec, with t-band for hip abd 0 x 10 sec   supine with legs stragiht 10 x 10 sec, supine in hooklying (no wedge) 10 x 10 sec, supine in hooklying with towel for hip add (no wedge) 10 x 10 sec, supine in hooklying with t-band for hip abd (no wedge) 10 x 10 sec progress to upright as able                                            1/12/2023-Bladder diary reviewed and foods tip sheet issued. ASSESSMENT     Assessment: Assessment: Patient did well with session today. She is able to recall her exercises. SHe was able to complete 40 reps of kegels today. She was able to relax down to levels 2-3 for downtraining. She was able to achieve levels 10-15 for all 10 reps with legs straight while on biofeedback. Wiith other positions and use of accessory muscles, she was able to achieve levels 15-30 while on biofeedback.   Body Structures, Functions, Activity Limitations Requiring Skilled Therapeutic Intervention: Decreased ADL status, Decreased strength, Decreased ROM, Decreased endurance    Post-Treatment Pain Level:      No data recorded  Therapy Prognosis: Good       GOALS   Patient Goals : decrease urine leakage  Short Term Goals Completed by 6 Weeks Current Status Goal Status   Patient PERF score 2/7/7/6       Patient to have good lumbopelvic stabilization in hooklying alone       Patient to report decreased urinary incontinence by 50%       Patient to be independent with HEP                                                           Long Term Goals Completed by 12 Weeks Current Status Goal Status   Patient to report decreased occurrance of going to urinate prior to leaving house       Patient to have good lumbopelvic stabilization in hooklying with alt ue/le activity       Patient to report decreased occurrance by 75%       Patient to demo increased function by scoring <= 15% impairment on PFIQ-7                                                            TREATMENT PLAN   Plan Frequency: 2x/week  Plan weeks: 12 weeks  Current Treatment Recommendations: Strengthening, ROM, Endurance training, Modalities, Neuromuscular re-education, Safety education & training, Home exercise program   Additional Comments: biofeedback       Therapy Time  Individual Time In: 1100       Individual Time Out: 4369  Minutes: 56  Timed Code Treatment Minutes: 56 Minutes     Electronically signed by Rachel Diggs PTA  on 1/19/2023 at 3:15 PM   POC NOTE  Electronically signed by Rachel Diggs PTA on 1/19/2023 at 3:16 PM

## 2023-01-24 ENCOUNTER — HOSPITAL ENCOUNTER (OUTPATIENT)
Dept: PHYSICAL THERAPY | Age: 53
Setting detail: THERAPIES SERIES
Discharge: HOME | End: 2023-01-24
Payer: COMMERCIAL

## 2023-01-24 PROCEDURE — 97110 THERAPEUTIC EXERCISES: CPT

## 2023-01-24 NOTE — PROGRESS NOTES
Physical Therapy: Daily Note   Patient: Clare Harman (4 y.o. female)   Examination Date:   Plan of Care/Certification Expiration Date: 23    No data recorded   :  1970 # of Visits since [facility]:   7   MRN: [id number]  CSN: [id number] Start of Care Date:   2023   Insurance: Payor: Jay Griffin / Plan: BCBS - [place] PPO / Product Type: *No Product type* /   Insurance ID: [id number] - ([place] BCBS) Secondary Insurance (if applicable):    Referring Physician: RAVEN Burks - CAREY CARBAJAL   PCP: Marleny Cooper MD Visits to Date/Visits Approved:     No Show/Cancelled Appts:   /       Medical Diagnosis: Overactive bladder [N32.81]  Urge incontinence [N39.41] overactive bladder N32.81, urge incontinence N39.41  Treatment Diagnosis: urge incontinence, weakness        SUBJECTIVE EXAMINATION   Pain Level:      Patient Comments: Subjective: Doing good today.   Found out my daughter has been moved to the transplant list.    HEP Compliance: Good        OBJECTIVE EXAMINATION     TREATMENT     Exercises:      Treatment Reasoning    Exercise 1: supine legs up wall x 3 mins  Exercise 2: supine left knee push isometric into hip ext 5 sec x 5--leg length equal today  Exercise 3: supine ta contraction alone 10 sec x 10, with ue's 1 x 10, with le's 1 x 10, with ue's and le's 1 x 10  Exercise 4: supine ta contraction with bridge 1 x 10  Exercise 5: supine posterior pelvic tilt 1 x 10  Exercise 6: supine hs 90-90 neutral position manual stretch 4 x 15 sec and piriformis >90 manual stretch 4 x 15 sec  Exercise 7: sit to stand with ta contraction x 10  Exercise 8: seated on t-ball ta contraction alone 10 x 10 sec, alt ue 1 x 10, alt le 1 x 10 , alt ue/le 1 x 10  Exercise 9: supine in slight trendelenburg pfm downtraining on biofeedback x 3 mins  Exercise 10: supine in slight trendelenburg pfm contraction on biofeedback alone 0 x 10 sec, with towel for hip add 0  x 10 sec, with t-band for hip abd 0 x 10 sec supine with legs stragiht 10 x 10 sec, supine in hooklying (no wedge) 10 x 10 sec, supine in hooklying with towel for hip add (no wedge) 10 x 10 sec, supine in hooklying with t-band for hip abd (no wedge) 10 x 10 sec progress to upright as able         ADD INCLINED AND SEATED  NEXT VISIT                                   1/12/2023-Bladder diary reviewed and foods tip sheet issued. ASSESSMENT     Assessment: Assessment: Patient did well with program.  She relaxed down to levels 1-3 for downtraining. She was able to achieve levels 15-30 with kegels. Plan to advance to harder positions next visit. SHe denies any pain pre or post session.   Body Structures, Functions, Activity Limitations Requiring Skilled Therapeutic Intervention: Decreased ADL status, Decreased strength, Decreased ROM, Decreased endurance    Post-Treatment Pain Level:      No data recorded  Therapy Prognosis: Good       GOALS   Patient Goals : decrease urine leakage  Short Term Goals Completed by 6 Weeks Current Status Goal Status   Patient PERF score 2/7/7/6       Patient to have good lumbopelvic stabilization in hooklying alone       Patient to report decreased urinary incontinence by 50%       Patient to be independent with HEP                                                           Long Term Goals Completed by 12 Weeks Current Status Goal Status   Patient to report decreased occurrance of going to urinate prior to leaving house       Patient to have good lumbopelvic stabilization in hooklying with alt ue/le activity       Patient to report decreased occurrance by 75%       Patient to demo increased function by scoring <= 15% impairment on PFIQ-7                                                            TREATMENT PLAN   Plan Frequency: 2x/week  Plan weeks: 12 weeks  Current Treatment Recommendations: Strengthening, ROM, Endurance training, Modalities, Neuromuscular re-education, Safety education & training, Home exercise program   Additional Comments: biofeedback       Therapy Time  Individual Time In: 1106       Individual Time Out: 9670  Minutes: 55  Timed Code Treatment Minutes: 55 Minutes     Electronically signed by Lily Lesch, PTA  on 1/24/2023 at 12:53 PM   POC NOTE  Electronically signed by Lily Lesch, PTA on 1/24/2023 at 12:54 PM

## 2023-01-26 ENCOUNTER — HOSPITAL ENCOUNTER (OUTPATIENT)
Dept: PHYSICAL THERAPY | Age: 53
Setting detail: THERAPIES SERIES
Discharge: HOME | End: 2023-01-26
Payer: COMMERCIAL

## 2023-01-26 PROCEDURE — 97110 THERAPEUTIC EXERCISES: CPT

## 2023-01-26 NOTE — PROGRESS NOTES
Physical Therapy: Daily Note   Patient: Tara Bales (51 y.o. female)   Examination Date: 15/20/7422  Plan of Care/Certification Expiration Date: 23    No data recorded   :  1970 # of Visits since [facility]:   8   MRN: [id number]  CSN: [id number] Start of Care Date:   2023   Insurance: Payor: Isai Mediate / Plan: BCBS - [place] PPO / Product Type: *No Product type* /   Insurance ID: [id number] - ([place] BCBS) Secondary Insurance (if applicable):    Referring Physician: RAVEN Handy - CNP Jacques CARBAJAL   PCP: Cayden Kerns MD Visits to Date/Visits Approved:     No Show/Cancelled Appts:   /       Medical Diagnosis: Overactive bladder [N32.81]  Urge incontinence [N39.41] overactive bladder N32.81, urge incontinence N39.41  Treatment Diagnosis: urge incontinence, weakness        SUBJECTIVE EXAMINATION   Pain Level: Pain Screening  Patient Currently in Pain: No    Patient Comments: Subjective: I am doing ex at home.   Contnuing to see improvement with urgency and frequency    HEP Compliance: Good        OBJECTIVE EXAMINATION       TREATMENT     Exercises:      Treatment Reasoning    Exercise 1: supine legs up wall x 3 mins  Exercise 2: supine left knee push isometric into hip ext 5 sec x 5--leg length equal today  Exercise 3: supine ta contraction alone 10 sec x 10, with ue's 1 x 10, with le's 1 x 10, with ue's and le's 1 x 10  Exercise 4: supine ta contraction with bridge 1 x 10  Exercise 5: supine posterior pelvic tilt 1 x 10  Exercise 6: supine hs 90-90 neutral position manual stretch 4 x 15 sec and piriformis >90 manual stretch 4 x 15 sec  Exercise 7: sit to stand with ta contraction x 10  Exercise 8: seated on t-ball ta contraction alone 10 x 10 sec, alt ue 1 x 10, alt le 1 x 10 , alt ue/le 1 x 10  Exercise 9: supine in slight trendelenburg pfm downtraining on biofeedback x 3 mins  Exercise 10: supine in slight trendelenburg pfm contraction on biofeedback alone 0 x 10 sec, with towel for hip add 0 x 10 sec, with t-band for hip abd 0 x 10 sec   supine with legs stragiht 10 x 10 sec, supine in hooklying (no wedge) 0 x 10 sec,  inclined in hooklying 10 sec x 10, inclined with towel roll for hip add 10 sec x 10, seated alone 10 sec x 10             progress to upright as able                                            1/12/2023-Bladder diary reviewed and foods tip sheet issued. ASSESSMENT     Assessment: Assessment: Patient continues to do well. She is reporting less frequency, urgency and leakage. She is able to recall her ex and performs with good tech. She was able to relax at levels 2-4 today with downtraining. With PFM contractions, she was able to achieve levels 20-40 in supine with legs straight position. Added inclined and seated positions today. She was able to achieve levels 20-30 with both of these positions.   She  Body Structures, Functions, Activity Limitations Requiring Skilled Therapeutic Intervention: Decreased ADL status, Decreased strength, Decreased ROM, Decreased endurance    Post-Treatment Pain Level:      No data recorded  Therapy Prognosis: Good       GOALS   Patient Goals : decrease urine leakage  Short Term Goals Completed by 6 Weeks Current Status Goal Status   Patient PERF score 2/7/7/6       Patient to have good lumbopelvic stabilization in hooklying alone       Patient to report decreased urinary incontinence by 50%       Patient to be independent with HEP                                                           Long Term Goals Completed by 12 Weeks Current Status Goal Status   Patient to report decreased occurrance of going to urinate prior to leaving house       Patient to have good lumbopelvic stabilization in hooklying with alt ue/le activity       Patient to report decreased occurrance by 75%       Patient to demo increased function by scoring <= 15% impairment on PFIQ-7                                                            TREATMENT PLAN Plan Frequency: 2x/week  Plan weeks: 12 weeks  Current Treatment Recommendations: Strengthening, ROM, Endurance training, Modalities, Neuromuscular re-education, Safety education & training, Home exercise program   Additional Comments: biofeedback       Therapy Time  Individual Time In: 1104       Individual Time Out: 9659  Minutes: 53  Timed Code Treatment Minutes: 53 Minutes     Electronically signed by Charlestine Najjar, PTA  on 1/26/2023 at 1:00 PM   POC NOTE  Electronically signed by Charlestine Najjar, PTA on 1/26/2023 at 1:01 PM

## 2023-01-31 ENCOUNTER — APPOINTMENT (OUTPATIENT)
Dept: PHYSICAL THERAPY | Age: 53
End: 2023-01-31
Payer: COMMERCIAL

## 2023-02-02 ENCOUNTER — HOSPITAL ENCOUNTER (OUTPATIENT)
Dept: PHYSICAL THERAPY | Age: 53
Setting detail: THERAPIES SERIES
Discharge: HOME | End: 2023-02-02
Payer: COMMERCIAL

## 2023-02-02 PROCEDURE — 97110 THERAPEUTIC EXERCISES: CPT

## 2023-02-02 NOTE — PROGRESS NOTES
Physical Therapy: Daily Note/ REASSESSMENT   Patient: Law Bauer (31 y.o. female)   Examination Date:   Plan of Care/Certification Expiration Date: 23    No data recorded   :  1970 # of Visits since [facility]:   9   MRN: [id number]  CSN: [id number] Start of Care Date:   2023   Insurance: Payor: Crista Riddle [facility] / Plan: BCBS - [place] PPO / Product Type: *No Product type* /   Insurance ID: [id number] - ([place] BCBS) Secondary Insurance (if applicable):    Referring Physician: RAVEN Keyes - CAREY CARBAJAL   PCP: Eve Alonso MD Visits to Date/Visits Approved:     No Show/Cancelled Appts:   /       Medical Diagnosis: Overactive bladder [N32.81]  Urge incontinence [N39.41] overactive bladder N32.81, urge incontinence N39.41  Treatment Diagnosis: urge incontinence, weakness        SUBJECTIVE EXAMINATION       Patient Comments: Subjective: Patient states she is having less leakage, but she is still noticing small drops of urine on her pad at the end of the day. She says she can also tell that her muscles are getting stronger.           OBJECTIVE EXAMINATION   Restrictions:  No data recorded No data recorded No data recorded        TREATMENT     Exercises:      Treatment Reasoning    Exercise 1: supine legs up wall x 3 mins  Exercise 2: supine left knee push isometric into hip ext 5 sec x 5, right hip flex 5 sec x 5  Exercise 3: supine ta contraction alone 10 sec x 10, with ue's 1 x 10, with le's 1 x 10, with ue's and le's 1 x 10  Exercise 4: supine ta contraction with bridge 1 x 10  Exercise 5: supine posterior pelvic tilt 1 x 10  Exercise 6: supine hs 90-90 neutral position manual stretch 4 x 15 sec and piriformis >90 manual stretch 4 x 15 sec  Exercise 7: sit to stand with ta contraction x 10  Exercise 8: seated on t-ball ta contraction alone 10 x 10 sec, alt ue 1 x 10, alt le 1 x 10 , alt ue/le 1 x 10  Exercise 9: supine in slight trendelenburg pfm downtraining on biofeedback x 3 mins- not today  Exercise 10: supine in slight trendelenburg pfm contraction on biofeedback alone 0 x 10 sec, with towel for hip add 0  x 10 sec, with t-band for hip abd 0 x 10 sec   supine with legs stragiht 10 x 10 sec, supine in hooklying (no wedge) 0 x 10 sec,  inclined in hooklying 10 sec x 10, inclined with towel roll for hip add 10 sec x 10, seated alone 10 sec x 10  - not today            progress to upright as able                                            1/12/2023-Bladder diary reviewed and foods tip sheet issued. PERF score 3/10/9/13                          Pt Education: Additional Comments: D/C with independent HEP       ASSESSMENT     Assessment: Assessment: Patient has shown increased pfm strength, improved lumbopelvic stabilization, and report of decreased urinary incontinence and urgency. She has met 7/8 goals at this time with progress toward 1/8 goals. She is performing HEP at home and reports confidence in continuing at home with HEP independently and d/c'ing today.        No data recorded  Therapy Prognosis: Good       GOALS   Patient Goals : decrease urine leakage  Short Term Goals Completed by 6 Weeks Current Status Goal Status   Patient PERF score to be 3+/10/15/20 02/02/2023: PERF score 3/10/9/13 In progress   Patient to have good lumbopelvic stabilization in hooklying alone 02/02/2023: Patient has good lumbopelvic stabilization in hooklying alone Met   Patient to report decreased urinary incontinence by 50% 02/02/2023: Patient reports decreased urinary incontinence by 90% Met   Patient to be independent with HEP 02/02/23: Patient states she is doing her independent HEP Met                                                       Long Term Goals Completed by 12 Weeks Current Status Goal Status   Patient to report decreased occurrance of going to urinate prior to leaving house 02/02/2023: Patient states she is going to urinate prior to leaving the house less now and feels more confident Met Patient to have good lumbopelvic stabilization in hooklying with alt ue/le activity Patient to have good lumbopelvic stabilization in hooklying with alt ue/le activity Met   Patient to report decreased occurrance by 75% 02/02/2023: Patient reports decreased urinary incontinence by 90% Met   Patient to demo increased function by scoring <= 15% impairment on PFIQ-7 02/02/2023: Patient scored 1.59% impairment on PFIQ-7 Met                                                        TREATMENT PLAN       Requires PT Follow-Up: No  Additional Comments: D/C with independent HEP       Therapy Time  Individual Time In: 1030       Individual Time Out: 1130  Minutes: 60  Timed Code Treatment Minutes: 60 Minutes     Electronically signed by Rocío Akbar PT  on 2/2/2023 at 11:48 AM   POC NOTE

## 2023-02-02 NOTE — PROGRESS NOTES
[facility] [place]  OUTPATIENT PHYSICAL THERAPY  DISCHARGE SUMMARY    Date: 2023  Patient Name: Angie Mauro        MRN: [id number]    ACCOUNT #: [de-identified]  : 1970  (45 y.o.)  Gender: female      Diagnosis: overactive bladder N32.81, urge incontinence N39.41          Total # of Visits Approved: 20  Total # of Visits to Date: 9    Subjective   Subjective: Patient states she is having less leakage, but she is still noticing small drops of urine on her pad at the end of the day. She says she can also tell that her muscles are getting stronger. Objective  Treatments received include: ther-ex, biofeedback   See objective/subjective data in goals    Assessment  Assessment: Patient has shown increased pfm strength, improved lumbopelvic stabilization, and report of decreased urinary incontinence and urgency. She has met 7/8 goals at this time with progress toward 1/8 goals. She is performing HEP at home and reports confidence in continuing at home with HEP independently and d/c'ing today.            GOALS   Patient Goals : decrease urine leakage  Short Term Goals Completed by 6 Weeks Current Status Goal Status   Patient PERF score to be 3+/10/15/20 2023: PERF score 3/10/9/13 In progress   Patient to have good lumbopelvic stabilization in hooklying alone 2023: Patient has good lumbopelvic stabilization in hooklying alone Met   Patient to report decreased urinary incontinence by 50% 2023: Patient reports decreased urinary incontinence by 90% Met   Patient to be independent with HEP 23: Patient states she is doing her independent HEP Met                                                       Long Term Goals Completed by 12 Weeks Current Status Goal Status   Patient to report decreased occurrance of going to urinate prior to leaving house 2023: Patient states she is going to urinate prior to leaving the house less now and feels more confident Met   Patient to have good lumbopelvic stabilization in hooklying with alt ue/le activity Patient to have good lumbopelvic stabilization in hooklying with alt ue/le activity Met   Patient to report decreased occurrance by 75% 02/02/2023: Patient reports decreased urinary incontinence by 90% Met   Patient to demo increased function by scoring <= 15% impairment on PFIQ-7 02/02/2023: Patient scored 1.59% impairment on PFIQ-7 Met                                                        TREATMENT PLAN       Requires PT Follow-Up: No  Additional Comments: D/C with independent HEP       Electronically signed by Tolu Delgado PT on 2/2/2023 at 11:49 AM

## 2023-03-27 ENCOUNTER — HOSPITAL ENCOUNTER (OUTPATIENT)
Dept: WOMENS IMAGING | Age: 53
Discharge: HOME | End: 2023-03-27
Payer: COMMERCIAL

## 2023-03-27 DIAGNOSIS — Z12.31 ENCOUNTER FOR SCREENING MAMMOGRAM FOR MALIGNANT NEOPLASM OF BREAST: ICD-10-CM

## 2023-03-27 PROCEDURE — 77067 SCR MAMMO BI INCL CAD: CPT | Performed by: GENERAL PRACTICE

## 2023-03-27 PROCEDURE — 77063 BREAST TOMOSYNTHESIS BI: CPT

## 2023-07-13 RX ORDER — MIRABEGRON 50 MG/1
TABLET, FILM COATED, EXTENDED RELEASE ORAL
Qty: 30 TABLET | Refills: 3 | Status: SHIPPED | OUTPATIENT
Start: 2023-07-13

## 2023-11-13 RX ORDER — MIRABEGRON 50 MG/1
TABLET, FILM COATED, EXTENDED RELEASE ORAL
Qty: 30 TABLET | Refills: 3 | Status: SHIPPED | OUTPATIENT
Start: 2023-11-13

## 2023-11-14 NOTE — PROGRESS NOTES
FOLLOW-UP AUDIOMETRIC EVALUATION      Name:  Yusra Fisher  :  1970  Age:  53 y.o.  Date of Evaluation:  11/15/2023       History:  Reason for visit:  Ms. Fisher is seen today at the request of LISA Clarke for a follow-up hearing evaluation. Patient has a history of sensorineural hearing loss of left ear with unrestricted hearing of the right ear. Previous audio was on 2022. She has not noticed any changes in her hearing.     PE Tubes:  no, both ears   Other otologic surgical history: no, both ears  Tinnitus:  no, both ears   Dizziness:  no  Noise Exposure: no  Aural Fullness:  no, both ears  Otalgia: no, both ears  Family history of hearing loss: mother  Other significant history:  none  Head trauma requiring hospital stay: no  Previous brain MRI: yes, about 10 years ago    EVALUATION:            RESULTS:    Otoscopic Evaluation:  Bilateral: clear canal, tympanic membrane visualized         Tympanometry (226 Hz):  Bilateral: Type A- normal    Pure Tone Audiometry (via inserts with good reliability):    Right: hearing sensitivity is within normal limits  Left: normal through 3kHz, sloping to mild hearing loss at 4kHz, rising back to normal at 6-8kHz         Speech Audiometry:   Bilateral: Speech Reception Threshold (SRT) was obtained at 15 dBHL  Word Recognition scores-  100% (excellent/within normal limits, %)  Presented at 55dBHL with 35dB of masking in opposite ear  Using NU-6 List 1A, 25 words each ear      IMPRESSIONS:  Tympanometry showed normal middle ear pressure and static compliance, for both ears. Pure tone thresholds for the right ear show hearing sensitivity is within normal limits, suggesting normal outer/middle ear function and normal cochlear/retrocochlear function. Pure tone thresholds for the left ear show a mild known sensorineural hearing loss at 4kHz, suggesting normal outer/middle ear function and abnormal cochlear/retrocochlear function. Known asymmetry with the  left ear worse than the right at 4kHz. No significant changes compared to previous audio in 2022 or 2021. Patient was counseled with regard to the findings.    Amplification needs:  Patient could benefit from amplification if they feel increased communication difficulties.    Diagnosis:   1. Sensorineural hearing loss (SNHL) of left ear with unrestricted hearing of right ear        RECOMMENDATIONS/PLAN:  Follow-up recommendations per LISA Clarke    Audiologic follow-up in 1-2 years  Return for audiologic testing if you begin to changes in hearing or concerns arise  Hearing aid evaluation and counseling upon medical clearance and patient motivation  Use communication strategies such as looking at the speaker when they talk, have them get your attention before they speak, have them rephrase instead of repeat if you cannot understand them, limit background noise and be in the same room as the speaker  Use hearing protection around loud noises such as lawn equipment, power tools, and firearms     EDUCATION:  Discussed results and recommendations with patient. Questions were addressed and the patient was encouraged to contact our department should concerns arise.      Bryn Anders [facility]  Licensed Audiologist

## 2023-11-15 ENCOUNTER — PROCEDURE VISIT (OUTPATIENT)
Dept: OTOLARYNGOLOGY | Facility: CLINIC | Age: 53
End: 2023-11-15
Payer: COMMERCIAL

## 2023-11-15 ENCOUNTER — OFFICE VISIT (OUTPATIENT)
Dept: OTOLARYNGOLOGY | Facility: CLINIC | Age: 53
End: 2023-11-15
Payer: COMMERCIAL

## 2023-11-15 VITALS
HEART RATE: 69 BPM | TEMPERATURE: 98.2 F | SYSTOLIC BLOOD PRESSURE: 110 MMHG | WEIGHT: 180 LBS | DIASTOLIC BLOOD PRESSURE: 68 MMHG | BODY MASS INDEX: 25.77 KG/M2 | HEIGHT: 70 IN

## 2023-11-15 DIAGNOSIS — J30.9 ALLERGIC RHINITIS, UNSPECIFIED SEASONALITY, UNSPECIFIED TRIGGER: ICD-10-CM

## 2023-11-15 DIAGNOSIS — H90.42 SENSORINEURAL HEARING LOSS (SNHL) OF LEFT EAR WITH UNRESTRICTED HEARING OF RIGHT EAR: Primary | ICD-10-CM

## 2023-11-15 NOTE — PATIENT INSTRUCTIONS
For the best response, use your nasal sprays every day without skipping doses. It may take several weeks before the full effect is acheived.  HEARING LOSS       Hearing loss is the third most common health condition in the [place] affecting more than 1 of every 10 people.  This means that over 28 million Americans suffer from hearing loss.  The condition varies with age: 1 out of every 25 children, 1 out of every 14 aged 14-44 years old, 1 out of every 7 adults aged 45-65 year old, and 1 out of every 3 adults over the age of 65 years old.    The ear works by receiving sound vibrations, amplifying the sound, and then converting the mechanical vibration into an electrical signal that is sent to the hearing center of the brain.  The ear has three basic parts, each with very specific functions:  The External Ear   This is made up of the parts of the ear you can see (the auricle), and the ear canal.  These structures function to funnel the sound vibrations down into the ear so that they can be processed.  The Middle Ear  The external ear and the middle ear are  by the eardrum (tympanic membrane).  The middle ear is an air-filled space that houses the middle ear bones (ossicles).  Sound waves hit the eardrum and cause it to vibrate.  The vibrations are then transferred to the ear bones that amplify the sound and transfer it to the inner ear (cochlea).  The Inner Ear  The inner ear is a snail-shaped bone that contains a fluid-filled membrane inside another fluid-filled membrane.  It acts like a battery to transform the mechanical vibrations into and electrical signal.  It does this with very delicate hair cells that rest on top of the inner ear membranes.  The electrical signal is then shipped out of the inner ear to the brain where it is processed and understood as sound.    Disease in any one of these parts of the ear can cause hearing loss, but can do so in two different ways.  When there is a problem  with the process of bringing the sound to the inner ear (i.e. problems in the external or middle ear) it is called a conductive hearing loss.  Problems in the inner ear or nerves of hearing care called sensorineural hearing losses.  Often times, however, there is a combination of the types of hearing losses or a mixed hearing loss.  Conductive Hearing Loss  Impairment of the transfer of sound into the inner ear because of problems with the external ear, the eardrum or the middle ear can cause conductive hearing loss.  Conductive losses can often be lessened or cured with medication or surgery, depending on the etiology.  Causes of Conductive Hearing Losses:  Wax occlusion of the ear canal  Eardrum perforations  Stiffened or scarred eardrums  Fluid in the middle earspace (otitis media with effusion)  Middle ear infections (acute otitis media)  Scarring or fixation of the ear bones (tympanosclerosis, otosclerosis)  Dislocation of the ear bones  Cholesteatoma: this is a “skin cyst” that develops due to severely retracted eardrums or from skin growth into the middle ear from a chronic eardrum perforation.  Over time the cyst can grow and erode the bones of hearing.  Middle ear tumors or masses  Sensorineural Hearing Loss  Deficits in hearing due to problems in the inner ear or nerves of hearing are termed sensorineural losses.  These are usually due to damage to the microscopic hair cells in the cochlea, or due to loss of the nerve cells in the hearing nerve.  For the most part, this type of hearing loss cannot be repaired with medication or surgery, except in rare instances.  Often times, however, it can be helped with hearing aids and rarely with cochlear implantation.  This type of loss can also be associated with tinnitus (ringing of the ears) and vertigo (dizziness).  Causes of Sensorineural Hearing Losses:  Damage from noise exposure  Aging:  this is often a slow, progressive loss of the high  frequencies  Infection (labrynthitis)  Secondary loss from the effects of meningitis  Genetic/familial related hearing loss  Autoimmune hearing loss (a rheumatoid-like process)  Temporal bond fracture (severe fracture of the bone around or through the inner ear)  Medication induced damage (chemotherapy, high dose IV antibiotics, diuretics)  Inner ear and skull base tumors (acoustic neuroma, memingioma)        Specific Causes    Ear Infections and Effusion in Children  Any time the middle ear is filled with fluid, as in an active infection or in middle ear effusion, there can be a conductive hearing.  Ear infections are the most common cause of infant hearing loss and are a very treatable type of hearing loss.  Usually the middle ear is filled with air, which allows the bones of hearing to freely move.  When there is fluid in this space it slows the vibration and is literally like trying to hear underwater.  Most of the time this infection or fluid can be treated with medication.  If this does not happen, often myringotomy tube placement can allow for drainage of fluid and allow the middle ear space to remain ventilated.  Congenital Hearing Loss  Hearing loss is the most common birth defect, affecting 3 in every 1000 children born in the [place].  If untreated, this can lead to significant problems in speech, language, and learning.  Recently, most hospitals have early infant screening that can identify newborns with hearing problems.  With accurate diagnosis, these children can often be treated with hearing aids and other forms of treatment that can allow them to develop normal speech and learning.  Noise Induced Hearing Loss  According to the National Institutes of Health, approximately 10 million Americans have hearing losses that are a least partially attributable to loud noise exposure.  Exposures that can cause permanent damage vary, but short exposures of very loud quality (gunfire) can be as bad as  longer exposures at lower levels.  Most conversations are at about 65-70 decibels.  Levels over 85dB, with exposures of up to 8 hours a day, will produce permanent hearing loss after many years of exposure.  A stereo headset turned up full blast (about 110 dB) can cause a significant hearing loss in less than a half an hour.  These losses are due to damage of the hair cells of the inner ear from the excessive vibrational acoustic trauma of the loud noise.  Since this loss is usually nonreversible, hearing protection is essential.  Foam earplugs can provide 15-30dB of noise protection.  Age Related Hearing Loss (Presbyacusis)  Age related hearing loss is another very common form of hearing loss in the elderly.  It is usually a slowly progressive, high frequency sensorineural loss that in nonreversible.  Not all elderly people will have hearing loss as this is very much related to the genetic makeup of the individual.  This can cause the patient to withdraw from social situations, or cause them to seem to have a mood change because of the frustration of not being able to hear a conversation or having to ask people to repeat things.  Most of the time, this can be well treated by amplifying sounds with a hearing aid.    Hearing loss can have a large impact in the way we function on a day to day basis with our environment.  We are fortunate that most hearing loss can be treated by hearing aids or medical and surgical treatments.  If you think you may be suffering from hearing loss, an evaluation by our doctor can help identify the cause and the specific treatment that can help improve your hearing.

## 2023-11-15 NOTE — PROGRESS NOTES
YOB: 1970  Location: [facility]  Location Address: [address] [place] [address], [facility], [address] [place] [address]  Location Phone: [phone]    Chief Complaint   Patient presents with    Ear Problem    Swollen Glands     Swollen lymph node on left side of neck earlier this week, it has gone down some since then        History of Present Illness  Yusra Fisher is a 53 y.o. female.  Yusra Fisher is here for follow up of ENT complaints. The patient has had problems with hearing loss and allergy symptoms  She states her hearing has remained stable and she denies ear pain or drainage   She states allergies are stable on nasal sprays  Patient does not feel as if she needs hearing aids at this time     She does complain of an enlarged lymph node to the left side of her neck earlier this week, she states this has resolved       Procedure visit with Ledy Márquez AUD (11/15/2023)      Past Medical History:   Diagnosis Date    Arthritis     Family history of colon cancer     Hypothyroidism     Migraines     Pernicious anemia     PONV (postoperative nausea and vomiting)     RA (rheumatoid arthritis)        Past Surgical History:   Procedure Laterality Date    ABDOMINOPLASTY      CHOLECYSTECTOMY WITH INTRAOPERATIVE CHOLANGIOGRAM N/A 10/22/2021    Procedure: LAPAROSCOPIC CHOLECYSTECTOMY;  Surgeon: Le Yusuf MD;  Location: [facility];  Service: General;  Laterality: N/A;    COLONOSCOPY  2016    The entire examined colon is normal on direct and retroflexion views; No specimens collected; Repeat 10 years    COLONOSCOPY  2012    Preparation of the colon was fair; The examined portion of the ileum was normal; The entire examined colon is normal on direct and retroflexion views; Repeat 4 years    COLONOSCOPY  2007    Normal colonoscopy; Repeat 4 years    COLONOSCOPY N/A 2021    Procedure: COLONOSCOPY WITH ANESTHESIA;  Surgeon: Aaliyah Holguin MD;  Location: [facility];   Service: Gastroenterology;  Laterality: N/A;  pre op: diarrhea  post op: normal  PCP: Rajan Boss MD    DILATATION AND CURETTAGE      ENDOSCOPY N/A 6/18/2021    Procedure: ESOPHAGOGASTRODUODENOSCOPY WITH ANESTHESIA;  Surgeon: Aaliyah Holguin MD;  Location: [facility] ENDOSCOPY;  Service: Gastroenterology;  Laterality: N/A;  pre op: nausea  post op: normal  PCP: Rajan Boss MD    ESSURE TUBAL LIGATION      HYSTERECTOMY  10/13/2020       Outpatient Medications Marked as Taking for the 11/15/23 encounter (Office Visit) with Nicki Cash APRN   Medication Sig Dispense Refill    Ascorbic Acid (Vitamin C) 500 MG capsule Take 1 capsule by mouth Daily.      azelastine (ASTELIN) 0.1 % nasal spray 2 sprays into the nostril(s) as directed by provider 2 (Two) Times a Day. Use in each nostril as directed 30 mL 11    DHEA 10 MG capsule Take 1 capsule by mouth Daily.      fluticasone (FLONASE) 50 MCG/ACT nasal spray 2 sprays into the nostril(s) as directed by provider Daily. 16 g 11    Hormone Cream Base cream Place 1 application on the skin as directed by provider Daily. 80% Estriol/20% Estradiol/0.5 testosterone Compounded at [facility] [place]      hydroxychloroquine (PLAQUENIL) 200 MG tablet Take 1 tablet by mouth Every Night.      IRON CR PO Take 85 mg by mouth 2 (two) times a day.      Magnesium 500 MG tablet Take 1 tablet by mouth 2 (two) times a day.      Myrbetriq 25 MG tablet sustained-release 24 hour 24 hr tablet       Progesterone (PROMETRIUM) 100 MG capsule Take 1 capsule by mouth Daily.      Thyroid 60 MG PO tablet Take 1.5 tablets by mouth Daily.      Vitamin D-Vitamin K (Vitamin K2-Vitamin D3)  MCG-UNIT capsule Take 1 capsule by mouth Daily.      Zinc 50 MG capsule Take 1 capsule by mouth Daily.         Penicillins and Chlorhexidine gluconate    Family History   Problem Relation Age of Onset    Diabetes Father     Colon cancer Maternal Grandfather         In his 60's or 70's    Liver cancer Neg  Hx     Liver disease Neg Hx     Stomach cancer Neg Hx     Colon polyps Neg Hx     Rectal cancer Neg Hx        Social History     Socioeconomic History    Marital status:    Tobacco Use    Smoking status: Never    Smokeless tobacco: Never   Vaping Use    Vaping Use: Never used   Substance and Sexual Activity    Alcohol use: Not Currently    Drug use: Never    Sexual activity: Defer     Partners: Male     Birth control/protection: Surgical     Comment: Essure       Review of Systems   Constitutional: Negative.    HENT:  Positive for hearing loss. Negative for ear pain and tinnitus.        Vitals:    11/15/23 1105   BP: 110/68   Pulse: 69   Temp: 98.2 °F (36.8 °C)       Body mass index is 25.83 kg/m².    Objective     Physical Exam  Vitals reviewed.   Constitutional:       Appearance: Normal appearance. She is normal weight.   HENT:      Head: Normocephalic.      Right Ear: Tympanic membrane, ear canal and external ear normal.      Left Ear: Tympanic membrane, ear canal and external ear normal.      Nose: Nose normal.      Mouth/Throat:      Lips: Pink.      Mouth: Mucous membranes are moist.      Pharynx: Uvula midline.   Musculoskeletal:      Cervical back: Full passive range of motion without pain.   Neurological:      Mental Status: She is alert.         Assessment & Plan   Diagnoses and all orders for this visit:    1. Sensorineural hearing loss (SNHL) of left ear with unrestricted hearing of right ear (Primary)  -     Comprehensive Hearing Test; Future    2. Allergic rhinitis, unspecified seasonality, unspecified trigger      * Surgery not found *  Orders Placed This Encounter   Procedures    Comprehensive Hearing Test     Standing Status:   Future     Standing Expiration Date:   11/15/2024     Order Specific Question:   Laterality     Answer:   Bilateral     Order Specific Question:   Release to patient     Answer:   Routine Release [[id number]]     Return in about 1 year (around 11/15/2024) for  Recheck.       Patient Instructions   For the best response, use your nasal sprays every day without skipping doses. It may take several weeks before the full effect is acheived.  HEARING LOSS       Hearing loss is the third most common health condition in the [place] affecting more than 1 of every 10 people.  This means that over 28 million Americans suffer from hearing loss.  The condition varies with age: 1 out of every 25 children, 1 out of every 14 aged 14-44 years old, 1 out of every 7 adults aged 45-65 year old, and 1 out of every 3 adults over the age of 65 years old.    The ear works by receiving sound vibrations, amplifying the sound, and then converting the mechanical vibration into an electrical signal that is sent to the hearing center of the brain.  The ear has three basic parts, each with very specific functions:  The External Ear   This is made up of the parts of the ear you can see (the auricle), and the ear canal.  These structures function to funnel the sound vibrations down into the ear so that they can be processed.  The Middle Ear  The external ear and the middle ear are  by the eardrum (tympanic membrane).  The middle ear is an air-filled space that houses the middle ear bones (ossicles).  Sound waves hit the eardrum and cause it to vibrate.  The vibrations are then transferred to the ear bones that amplify the sound and transfer it to the inner ear (cochlea).  The Inner Ear  The inner ear is a snail-shaped bone that contains a fluid-filled membrane inside another fluid-filled membrane.  It acts like a battery to transform the mechanical vibrations into and electrical signal.  It does this with very delicate hair cells that rest on top of the inner ear membranes.  The electrical signal is then shipped out of the inner ear to the brain where it is processed and understood as sound.    Disease in any one of these parts of the ear can cause hearing loss, but can do so in two  different ways.  When there is a problem with the process of bringing the sound to the inner ear (i.e. problems in the external or middle ear) it is called a conductive hearing loss.  Problems in the inner ear or nerves of hearing care called sensorineural hearing losses.  Often times, however, there is a combination of the types of hearing losses or a mixed hearing loss.  Conductive Hearing Loss  Impairment of the transfer of sound into the inner ear because of problems with the external ear, the eardrum or the middle ear can cause conductive hearing loss.  Conductive losses can often be lessened or cured with medication or surgery, depending on the etiology.  Causes of Conductive Hearing Losses:  Wax occlusion of the ear canal  Eardrum perforations  Stiffened or scarred eardrums  Fluid in the middle earspace (otitis media with effusion)  Middle ear infections (acute otitis media)  Scarring or fixation of the ear bones (tympanosclerosis, otosclerosis)  Dislocation of the ear bones  Cholesteatoma: this is a “skin cyst” that develops due to severely retracted eardrums or from skin growth into the middle ear from a chronic eardrum perforation.  Over time the cyst can grow and erode the bones of hearing.  Middle ear tumors or masses  Sensorineural Hearing Loss  Deficits in hearing due to problems in the inner ear or nerves of hearing are termed sensorineural losses.  These are usually due to damage to the microscopic hair cells in the cochlea, or due to loss of the nerve cells in the hearing nerve.  For the most part, this type of hearing loss cannot be repaired with medication or surgery, except in rare instances.  Often times, however, it can be helped with hearing aids and rarely with cochlear implantation.  This type of loss can also be associated with tinnitus (ringing of the ears) and vertigo (dizziness).  Causes of Sensorineural Hearing Losses:  Damage from noise exposure  Aging:  this is often a slow,  progressive loss of the high frequencies  Infection (labrynthitis)  Secondary loss from the effects of meningitis  Genetic/familial related hearing loss  Autoimmune hearing loss (a rheumatoid-like process)  Temporal bond fracture (severe fracture of the bone around or through the inner ear)  Medication induced damage (chemotherapy, high dose IV antibiotics, diuretics)  Inner ear and skull base tumors (acoustic neuroma, memingioma)        Specific Causes    Ear Infections and Effusion in Children  Any time the middle ear is filled with fluid, as in an active infection or in middle ear effusion, there can be a conductive hearing.  Ear infections are the most common cause of infant hearing loss and are a very treatable type of hearing loss.  Usually the middle ear is filled with air, which allows the bones of hearing to freely move.  When there is fluid in this space it slows the vibration and is literally like trying to hear underwater.  Most of the time this infection or fluid can be treated with medication.  If this does not happen, often myringotomy tube placement can allow for drainage of fluid and allow the middle ear space to remain ventilated.  Congenital Hearing Loss  Hearing loss is the most common birth defect, affecting 3 in every 1000 children born in the [place].  If untreated, this can lead to significant problems in speech, language, and learning.  Recently, most hospitals have early infant screening that can identify newborns with hearing problems.  With accurate diagnosis, these children can often be treated with hearing aids and other forms of treatment that can allow them to develop normal speech and learning.  Noise Induced Hearing Loss  According to the National Institutes of Health, approximately 10 million Americans have hearing losses that are a least partially attributable to loud noise exposure.  Exposures that can cause permanent damage vary, but short exposures of very loud quality  (gunfire) can be as bad as longer exposures at lower levels.  Most conversations are at about 65-70 decibels.  Levels over 85dB, with exposures of up to 8 hours a day, will produce permanent hearing loss after many years of exposure.  A stereo headset turned up full blast (about 110 dB) can cause a significant hearing loss in less than a half an hour.  These losses are due to damage of the hair cells of the inner ear from the excessive vibrational acoustic trauma of the loud noise.  Since this loss is usually nonreversible, hearing protection is essential.  Foam earplugs can provide 15-30dB of noise protection.  Age Related Hearing Loss (Presbyacusis)  Age related hearing loss is another very common form of hearing loss in the elderly.  It is usually a slowly progressive, high frequency sensorineural loss that in nonreversible.  Not all elderly people will have hearing loss as this is very much related to the genetic makeup of the individual.  This can cause the patient to withdraw from social situations, or cause them to seem to have a mood change because of the frustration of not being able to hear a conversation or having to ask people to repeat things.  Most of the time, this can be well treated by amplifying sounds with a hearing aid.    Hearing loss can have a large impact in the way we function on a day to day basis with our environment.  We are fortunate that most hearing loss can be treated by hearing aids or medical and surgical treatments.  If you think you may be suffering from hearing loss, an evaluation by our doctor can help identify the cause and the specific treatment that can help improve your hearing.

## 2023-12-07 ENCOUNTER — OFFICE VISIT (OUTPATIENT)
Dept: OBGYN CLINIC | Age: 53
End: 2023-12-07
Payer: COMMERCIAL

## 2023-12-07 VITALS
WEIGHT: 170 LBS | SYSTOLIC BLOOD PRESSURE: 134 MMHG | DIASTOLIC BLOOD PRESSURE: 77 MMHG | BODY MASS INDEX: 24.39 KG/M2 | HEART RATE: 66 BPM

## 2023-12-07 DIAGNOSIS — N95.2 VAGINAL ATROPHY: ICD-10-CM

## 2023-12-07 DIAGNOSIS — Z80.3 FAMILY HISTORY OF BREAST CANCER: ICD-10-CM

## 2023-12-07 DIAGNOSIS — N39.41 URGE INCONTINENCE: ICD-10-CM

## 2023-12-07 DIAGNOSIS — Z01.419 WOMEN'S ANNUAL ROUTINE GYNECOLOGICAL EXAMINATION: Primary | ICD-10-CM

## 2023-12-07 DIAGNOSIS — Z12.31 ENCOUNTER FOR SCREENING MAMMOGRAM FOR MALIGNANT NEOPLASM OF BREAST: ICD-10-CM

## 2023-12-07 DIAGNOSIS — N32.81 OAB (OVERACTIVE BLADDER): ICD-10-CM

## 2023-12-07 PROCEDURE — 99396 PREV VISIT EST AGE 40-64: CPT | Performed by: NURSE PRACTITIONER

## 2023-12-07 RX ORDER — ESTRADIOL 0.1 MG/G
1 CREAM VAGINAL
Qty: 1 EACH | Refills: 3 | Status: SHIPPED | OUTPATIENT
Start: 2023-12-08

## 2023-12-07 ASSESSMENT — ENCOUNTER SYMPTOMS
DIARRHEA: 0
ALLERGIC/IMMUNOLOGIC NEGATIVE: 1
CONSTIPATION: 0
GASTROINTESTINAL NEGATIVE: 1
EYES NEGATIVE: 1
RESPIRATORY NEGATIVE: 1

## 2023-12-07 ASSESSMENT — PATIENT HEALTH QUESTIONNAIRE - PHQ9
SUM OF ALL RESPONSES TO PHQ9 QUESTIONS 1 & 2: 0
1. LITTLE INTEREST OR PLEASURE IN DOING THINGS: NOT AT ALL
2. FEELING DOWN, DEPRESSED OR HOPELESS: NOT AT ALL
SUM OF ALL RESPONSES TO PHQ9 QUESTIONS 1 & 2: 0
2. FEELING DOWN, DEPRESSED OR HOPELESS: 0
SUM OF ALL RESPONSES TO PHQ QUESTIONS 1-9: 0
1. LITTLE INTEREST OR PLEASURE IN DOING THINGS: 0

## 2023-12-07 NOTE — PROGRESS NOTES
Pt presents today for pap smear and breast exam.      Mammo:3/27/2023  Pap smear:  Contraception:hsyt    P:  Ab:   Bone density:10/14/2021  Colonoscopy:pt states within last 3 yrs from 
Negative. Neurological: Negative. Hematological: Negative. Psychiatric/Behavioral: Negative. All other systems reviewed and are negative. Physical Exam  Vitals and nursing note reviewed. Constitutional:       Appearance: She is well-developed. HENT:      Head: Normocephalic. Neck:      Thyroid: No thyroid mass or thyromegaly. Cardiovascular:      Rate and Rhythm: Normal rate and regular rhythm. Pulmonary:      Effort: Pulmonary effort is normal.      Breath sounds: Normal breath sounds. Chest:   Breasts:     Right: No inverted nipple, mass, nipple discharge or skin change. Left: No inverted nipple, mass, nipple discharge or skin change. Abdominal:      Palpations: Abdomen is soft. There is no mass. Tenderness: There is no abdominal tenderness. Genitourinary:     General: Normal vulva. Vagina: Normal.      Uterus: Absent. Adnexa:         Right: No mass or tenderness. Left: No mass or tenderness. Comments: Cervix and uterus surgically absent  Ovaries surgically absent  Vaginal cuff palpates smooth  Musculoskeletal:         General: Normal range of motion. Cervical back: Normal range of motion and neck supple. Skin:     General: Skin is warm and dry. Neurological:      Mental Status: She is alert and oriented to person, place, and time. Psychiatric:         Attention and Perception: Attention normal.         Mood and Affect: Mood normal.         Speech: Speech normal.         Behavior: Behavior normal.         Thought Content: Thought content normal.         Cognition and Memory: Cognition normal.         Judgment: Judgment normal.              Diagnosis Orders   1. Women's annual routine gynecological examination        2. Encounter for screening mammogram for malignant neoplasm of breast  TERA DIGITAL SCREEN W OR WO CAD BILATERAL      3. Family history of breast cancer        4.  Vaginal atrophy  estradiol (ESTRACE VAGINAL) 0.1 MG/GM

## 2024-01-26 NOTE — PROGRESS NOTES
"Chief Complaint:   Chief Complaint   Patient presents with   • Diarrhea     Pt c/o \"lots\" of diarrhea since Feb/March-states it just keep getting worse-is having multiple loose BM's/day; Pt states she has eliminated gluten, dairy, eggs, and multiple other things from her diet but that hasn't helped    • Nausea     Pt also c/o some nausea occasionally          Patient ID: Yusra Fisher is a 51 y.o. female     History of Present Illness: This is a very pleasant 51-year-old female who is here today with complaints of diarrhea and nausea.    The patient states that she has been having diarrhea since around February and March.  She states \"it just keeps getting worse with multiple loose stools a day.\"  She states that she has seen black tarry stools but is on iron supplement.  She states that she eliminated gluten, dairy, eggs and multiple other things but that has not seemed to help.  She states on occasion she has had associated nausea.  The patient was referred by SARA Goldman with [facility] for complaints of chronic diarrhea.  Gastrointestinal panel along with C. difficile stools were ordered however I do not have those results.  The patient's last colonoscopy was performed on 1/29/2016 found to be normal with recall set for 10 years.  Is a family history of colon cancer.  The patient is on multiple over-the-counter supplements which we are unable to find noted in the system to place under her med list.  She denies any previous antibiotic treatment.  She states she recently started a probiotic with prebiotic.  She states she takes \"grass fed spleen and grass fed collagen.\"  She states that she went to [facility] and underwent a Zytoscan and was told she had \"leaky gut.\" she tells me that stools were turned into Dr. Robin's office yesterday for pathogens including C. difficile.    The patient denies any vomiting, epigastric pain, dysphagia, pyrosis or hematemesis.  The patient denies any " Lab Results   Component Value Date    HGBA1C 6.5 (H) 11/07/2023       Recent Labs     01/25/24 2032 01/26/24  0712 01/26/24  1119 01/26/24  1609   POCGLU 125 117 119 154*         Blood Sugar Average: Last 72 hrs:  (P) 109.5  Target blood sugar for the hospital is 140-180  Blood sugars are stable at this time  Continue Accu-Cheks before meals and at bedtime with sliding scale coverage  Oral hypoglycemics remain on hold  Diabetic neuropathy-continue Lyrica     fever or chills.  Denies any hematochezia.  Denies any unintentional weight loss or loss of appetite.      Past Medical History:   Diagnosis Date   • Arthritis    • Family history of colon cancer    • Hypertension    • Hypothyroidism    • Migraines    • Pernicious anemia 2021   • RA (rheumatoid arthritis) (CMS/HCC)        Past Surgical History:   Procedure Laterality Date   • ABDOMINOPLASTY     • COLONOSCOPY  01/29/2016    The entire examined colon is normal on direct and retroflexion views; No specimens collected; Repeat 10 years   • COLONOSCOPY  01/13/2012    Preparation of the colon was fair; The examined portion of the ileum was normal; The entire examined colon is normal on direct and retroflexion views; Repeat 4 years   • COLONOSCOPY  11/30/2007    Normal colonoscopy; Repeat 4 years   • DILATATION AND CURETTAGE     • ESSURE TUBAL LIGATION     • HYSTERECTOMY  10/13/2020         Current Outpatient Medications:   •  Ascorbic Acid (Vitamin C) 500 MG capsule, Take 1 capsule by mouth Daily., Disp: , Rfl:   •  Bacillus Coagulans-Inulin (PROBIOTIC-PREBIOTIC PO), Take 1 capsule by mouth Daily., Disp: , Rfl:   •  Cyanocobalamin (Vitamin B-12) 1000 MCG sublingual tablet, Place 2 tablets under the tongue Daily., Disp: , Rfl:   •  DHEA 10 MG capsule, Take 1 capsule by mouth Daily., Disp: , Rfl:   •  Evening Primrose Oil 1000 MG capsule, Take 1 capsule by mouth Daily., Disp: , Rfl:   •  folic acid (FOLVITE) 1 MG tablet, Take 5 mg by mouth Daily., Disp: , Rfl:   •  Hormone Cream Base cream, Place 1 application on the skin as directed by provider Daily. 80% Estriol/20% Estradiol/0.5 testosterone Compounded at [facility], Disp: , Rfl:   •  hydroxychloroquine (PLAQUENIL) 200 MG tablet, Take 200 mg by mouth 2 (Two) Times a Day., Disp: , Rfl:   •  IRON CR PO, Take 85 mg by mouth 2 (two) times a day., Disp: , Rfl:   •  Magnesium 500 MG tablet, Take 1 tablet by mouth 2 (two) times a day., Disp: , Rfl:   •  MAGNESIUM PO, Take  "500 mg by mouth 2 (two) times a day., Disp: , Rfl:   •  Progesterone (PROMETRIUM) 100 MG capsule, Take 1 capsule by mouth Daily., Disp: , Rfl:   •  sertraline (ZOLOFT) 25 MG tablet, Take 25 mg by mouth Daily., Disp: , Rfl:   •  Thyroid 60 MG PO tablet, Take 90 mg by mouth Daily., Disp: , Rfl:   •  Vitamin D-Vitamin K (Vitamin K2-Vitamin D3)  MCG-UNIT capsule, Take 1 capsule by mouth Daily., Disp: , Rfl:   •  Zinc 50 MG capsule, Take 1 capsule by mouth Daily., Disp: , Rfl:   •  Sodium Sulfate-Mag Sulfate-KCl 6984-274-195 MG tablet, Take 12 tablets by mouth Take As Directed., Disp: 24 tablet, Rfl: 0    Allergies   Allergen Reactions   • Penicillins Rash   • Chlorhexidine Gluconate Rash     Possible; rash with surgical wash during breast surgery.        Social History     Socioeconomic History   • Marital status:      Spouse name: Not on file   • Number of children: Not on file   • Years of education: Not on file   • Highest education level: Not on file   Tobacco Use   • Smoking status: Never Smoker   • Smokeless tobacco: Never Used   Vaping Use   • Vaping Use: Never used   Substance and Sexual Activity   • Alcohol use: Not Currently   • Drug use: No   • Sexual activity: Yes     Partners: Male     Birth control/protection: Surgical     Comment: [employer]       Family History   Problem Relation Age of Onset   • Diabetes Father    • Colon cancer Maternal Grandfather         In his 60's or 70's   • Liver cancer Neg Hx    • Liver disease Neg Hx    • Stomach cancer Neg Hx    • Colon polyps Neg Hx    • Rectal cancer Neg Hx        Vitals:    06/02/21 0855   BP: 110/70   BP Location: Left arm   Patient Position: Sitting   Cuff Size: Adult   Pulse: 60   Temp: 96.8 °F (36 °C)   TempSrc: Infrared   SpO2: 100%   Weight: 73.5 kg (162 lb)   Height: 177.8 cm (70\")       Review of Systems:    General:    Present -feeling well   Skin:    Not Present-Rash   HEENT:     Not Present-Acute visual changes or Acute hearing changes "   Neck :    Not Present- swollen glands   Genitourinary:      Not Present- burning, frequency, urgency hematuria, dysuria,   Cardiovascular:   Not Present-chest pain, palpitations, or pressure   Respiratory:   Not Present- shortness of breath or cough   Gastrointestinal:  Musculoskeletal:  Neurological:  Psychiatric:   Present as mentioned in the HP    Not Present. Recent gait disturbances.    Not Present-Seizures and weakness in extremities.    Not Present- Anxiety or Depression.       Physical Exam:    General Appearance:    Alert, cooperative, in no acute distress   Psych:    Mood appropriate    Eyes:          conjunctivae and sclerae normal, no   icterus, no pallor   ENMT:    Ears appear intact with no abnormalities noted oral mucosa moist   Neck:   No adenopathy, supple, trachea midline, no thyromegaly, no   carotid bruit, no JVD    Cardiovascular:    Regular rhythm and normal rate, normal S1 and S2, no            murmur, no gallop, no rub, no click   Gastrointestinal:     Inspection normal.  Normal bowel sounds, no masses, no organomegaly, soft round non-tender, non-distended, no guarding, no rebound or tenderness. No hepatosplenomegaly.   Skin:   No bleeding, bruising or rash   Neurologic:   nonfocal         Lab Results - Last 18 Months   Lab Units 06/05/20  0950   HEMOGLOBIN g/dL 11.8*   HEMATOCRIT % 36.7*   MCV fL 97.9   WBC K/uL 5.1   RDW % 12.8   MPV fL 11.0   PLATELETS K/uL 246       Assessment and Plan:  Assessment/Plan   Diagnoses and all orders for this visit:    1. Diarrhea, unspecified type (Primary)  -     Case Request; Standing  -     Case Request  -     CBC & Differential; Future  -     Comprehensive Metabolic Panel; Future    2. Family history of colon cancer  -     Case Request; Standing  -     Case Request    3. Black stools  -     Case Request; Standing  -     Case Request  -     CBC & Differential; Future  -     Comprehensive Metabolic Panel; Future    4. Nausea  -     Case Request;  Standing  -     Case Request    Other orders  -     Follow Anesthesia Guidelines / Protocol; Future  -     Obtain Informed Consent; Future  -     Implement Anesthesia Orders Day of Procedure; Standing  -     Obtain Informed Consent; Standing  -     Verify bowel prep was successful; Standing  -     Sodium Sulfate-Mag Sulfate-KCl 8701-338-078 MG tablet; Take 12 tablets by mouth Take As Directed.  Dispense: 24 tablet; Refill: 0    Will schedule patient for both EGD and colonoscopy.  We have requested results of gastrointestinal panel for pathogens as well as C. difficile from Dr. Robin's office and will await those results.     There are no Patient Instructions on file for this visit.    Next follow-up appointment      The risks, benefits, and alternatives of endoscopy were reviewed with the patient today.  Risks including perforation, with or without dilation, possibly requiring surgery.  Additional risks include risk of bleeding.  There is also the risk of a drug reaction or problems with anesthesia.  This will be discussed with the further by the anesthesia team on the day of the procedure. The benefits include the diagnosis and management of disease of the upper digestive tract.  Alternatives to endoscopy include upper GI series, laboratory testing, radiographic evaluation, or no intervention.  The patient verbalizes understanding and agrees.      The risks, benefits, and alternatives of colonoscopy were reviewed with the patient today.  Risks including perforation of the colon possibly requiring surgery or colostomy.  Additional risks include risk of bleeding from biopsies or removal of colon tissue.  There is also the risk of a drug reaction or problems with anesthesia.  This will be discussed with the further by the anesthesia team on the day of the procedure.  Lastly there is a possibility of missing a colon polyp or cancer.  The benefits include the diagnosis and management of disease of the colon and  rectum.  Alternatives to colonoscopy include barium enema, laboratory testing, radiographic evaluation, or no intervention.  The patient verbalizes understanding and agrees.      EMR Dragon/Transcription disclaimer:  Much of this encounter note is an electronic transcription/translation of spoken language to printed text. The electronic translation of spoken language may permit erroneous, or at times, nonsensical words or phrases to be inadvertently transcribed; although I have reviewed the note for such errors, some may still exist.

## 2024-03-28 ENCOUNTER — HOSPITAL ENCOUNTER (OUTPATIENT)
Dept: WOMENS IMAGING | Age: 54
Discharge: HOME | End: 2024-03-28
Payer: COMMERCIAL

## 2024-03-28 DIAGNOSIS — Z12.31 ENCOUNTER FOR SCREENING MAMMOGRAM FOR MALIGNANT NEOPLASM OF BREAST: ICD-10-CM

## 2024-03-28 PROCEDURE — 77063 BREAST TOMOSYNTHESIS BI: CPT

## 2024-06-18 ENCOUNTER — TELEMEDICINE (OUTPATIENT)
Dept: OBGYN CLINIC | Age: 54
End: 2024-06-18
Payer: COMMERCIAL

## 2024-06-18 DIAGNOSIS — R68.82 DECREASED LIBIDO: Primary | ICD-10-CM

## 2024-06-18 DIAGNOSIS — R37 SEXUAL DYSFUNCTION: ICD-10-CM

## 2024-06-18 PROCEDURE — 99213 OFFICE O/P EST LOW 20 MIN: CPT | Performed by: NURSE PRACTITIONER

## 2024-06-18 ASSESSMENT — ENCOUNTER SYMPTOMS
DIARRHEA: 0
GASTROINTESTINAL NEGATIVE: 1
RESPIRATORY NEGATIVE: 1
CONSTIPATION: 0
EYES NEGATIVE: 1
ALLERGIC/IMMUNOLOGIC NEGATIVE: 1

## 2024-06-18 NOTE — PROGRESS NOTES
Chanda Wetzel is a 54 y.o. female who presents today for her medical conditions/ complaints as noted below. Chanda Wetzel is c/o of No chief complaint on file.    Chanda Wetzel, was evaluated through a synchronous (real-time) audio-video encounter. The patient (or guardian if applicable) is aware that this is a billable service, which includes applicable co-pays. This Virtual Visit was conducted with patient's (and/or legal guardian's) consent. Patient identification was verified, and a caregiver was present when appropriate.   The patient was located at Home: [address] [place] [address]  Provider was located at Facility (Appt Dept): [address] [facility]  [address]  [place] [address]  Confirm you are appropriately licensed, registered, or certified to deliver care in the state where the patient is located as indicated above. If you are not or unsure, please re-schedule the visit: Yes, I confirm.        HPI  Pt presents c/o decreased libido and difficulty with orgasm. Has been using HRT pellets and doing well. Feels good sex drive when first inserted, but a lot less when it comes toward the end of treatment. Due for placement yesterday and her appointment was cancelled. Having her  anniversary soon. Possibly interested in starting scream cream or other options to help with libido when pellet treatment starts \"wearing off.\"    Patient's last menstrual period was 2020.      Past Medical History:   Diagnosis Date    Heavy menses     \"constant\"    Hypothyroidism     PONV (postoperative nausea and vomiting)     RA (rheumatoid arthritis) (HCC)     ra     Past Surgical History:   Procedure Laterality Date    BREAST BIOPSY Right     B9    CHOLECYSTECTOMY      10/20/2021    COLONOSCOPY      per pt within last 3 years from     DILATION AND CURETTAGE OF UTERUS N/A 2020    HYSTEROSCOPY, ENDOMETRIAL ABLATION WITH NOVASURE, DILATION AND CURETTAGE performed by Dorothy Valdez

## 2024-11-13 ENCOUNTER — PROCEDURE VISIT (OUTPATIENT)
Dept: OTOLARYNGOLOGY | Facility: CLINIC | Age: 54
End: 2024-11-13
Payer: COMMERCIAL

## 2024-11-13 ENCOUNTER — OFFICE VISIT (OUTPATIENT)
Dept: OTOLARYNGOLOGY | Facility: CLINIC | Age: 54
End: 2024-11-13
Payer: COMMERCIAL

## 2024-11-13 VITALS
SYSTOLIC BLOOD PRESSURE: 135 MMHG | HEIGHT: 70 IN | BODY MASS INDEX: 24.62 KG/M2 | HEART RATE: 57 BPM | DIASTOLIC BLOOD PRESSURE: 68 MMHG | WEIGHT: 172 LBS | TEMPERATURE: 98 F

## 2024-11-13 DIAGNOSIS — H90.42 SENSORINEURAL HEARING LOSS (SNHL) OF LEFT EAR WITH UNRESTRICTED HEARING OF RIGHT EAR: Primary | ICD-10-CM

## 2024-11-13 DIAGNOSIS — J30.9 ALLERGIC RHINITIS, UNSPECIFIED SEASONALITY, UNSPECIFIED TRIGGER: ICD-10-CM

## 2024-11-13 NOTE — PROGRESS NOTES
AUDIOMETRIC EVALUATION      Name:  Yusra Fisher  :  1970  Age:  54 y.o.  Date of Evaluation:  2024       History:  Ms. Fisher is seen today at the request of PHIL Clarke for a follow-up hearing evaluation. Patient has a history of sensorineural hearing loss of the left ear. Previous audio was on 11/15/2023.    Audiologic Information:  PETs: No  Other otologic surgical history: No  Aural Pressure/Fullness: No  Otalgia: No  Otorrhea: No  Tinnitus: No  Dizziness: had some lightheaded with a little spinning through most of last week, but seems resolved  Other significant history: Hashimoto's    **Case history obtained in office and through EMR system      EVALUATION:        RESULTS:    Otoscopic Evaluation:  Right: clear canal, tympanic membrane visualized  Left: clear canal, tympanic membrane visualized    Tympanometry (226 Hz):  Right: Type A  Left: Type A    Pure Tone Audiometry:    Right: Normal hearing thresholds   Left: Normal hearing thresholds (250Hz-3000Hz) sloping to a moderate (4000Hz) sensorineural hearing loss rising to hearing within normal limits (6000Hz-8000 Hz)     Speech Audiometry:   Right: Speech Reception Threshold (SRT) was obtained at 15 dB HL  Word Recognition scores - Excellent (100)% at 55 dB, using NU-6 List 2A with 10 words  Left: Speech Reception Threshold (SRT) was obtained at 15 dB HL  Word Recognition scores - Excellent (100)% at 55 dB, using NU-6 List 2A with 10 words  SRT/PTA in good agreement bilaterally.    IMPRESSIONS:  Tympanometry showed normal middle ear pressure and static compliance, consistent with normal middle ear function for both ears. Pure tone thresholds for the right ear show normal hearing, suggesting normal outer/middle ear function and normal cochlear/retrocochlear function. Pure tone thresholds for the left ear show sensorineural hearing loss, suggesting normal outer/middle ear function and abnormal cochlear/retrocochlear function.  Patient was counseled with regard to the findings.        Diagnosis:  1. Sensorineural hearing loss (SNHL) of left ear with unrestricted hearing of right ear         RECOMMENDATIONS/PLAN:  Follow-up recommendations per Nicki Cash, JAMIE-APRN.  Repeat hearing evaluation if changes in hearing are noted or concerns arise.  Discussed results and recommendations with patient. Questions were addressed and the patient was encouraged to contact our department should concerns arise.        Saritha Rebolledo, CCC-A, F-AAA  Doctor of Audiology

## 2024-11-13 NOTE — PROGRESS NOTES
YOB: 1970  Location: [facility]  Location Address: [address] [place] [address], [facility], [address] [place] [address]  Location Phone: [phone]    Chief Complaint   Patient presents with    Ear Problem    Dizziness       History of Present Illness  Yusra Fisher is a 54 y.o. female.  Yusra Fisher is here for follow up of ENT complaints. The patient has had problems with hearing loss and allergy symptoms.  Patient reports stable hearing for the past year she denies ear pain or drainage she reports stable allergy symptoms on nasal sprays     Procedure visit with Saritha Bernabe AUD (2024)   Past Medical History:   Diagnosis Date    Allergic rhinitis     Arthritis     Dizziness 2024    Family history of colon cancer     Hypothyroidism     Migraines     Pernicious anemia     PONV (postoperative nausea and vomiting)     RA (rheumatoid arthritis)        Past Surgical History:   Procedure Laterality Date    ABDOMINOPLASTY      CHOLECYSTECTOMY WITH INTRAOPERATIVE CHOLANGIOGRAM N/A 10/22/2021    Procedure: LAPAROSCOPIC CHOLECYSTECTOMY;  Surgeon: Le Yusuf MD;  Location: [facility] OR;  Service: General;  Laterality: N/A;    COLONOSCOPY  2016    The entire examined colon is normal on direct and retroflexion views; No specimens collected; Repeat 10 years    COLONOSCOPY  2012    Preparation of the colon was fair; The examined portion of the ileum was normal; The entire examined colon is normal on direct and retroflexion views; Repeat 4 years    COLONOSCOPY  2007    Normal colonoscopy; Repeat 4 years    COLONOSCOPY N/A 2021    Procedure: COLONOSCOPY WITH ANESTHESIA;  Surgeon: Aaliyah Holguin MD;  Location: [facility] ENDOSCOPY;  Service: Gastroenterology;  Laterality: N/A;  pre op: diarrhea  post op: normal  PCP: Rajan Boss MD    DILATATION AND CURETTAGE      ENDOSCOPY N/A 2021    Procedure: ESOPHAGOGASTRODUODENOSCOPY WITH ANESTHESIA;  Surgeon: Aaliyah Holguin  MD NIKKIE;  Location: [facility] ENDOSCOPY;  Service: Gastroenterology;  Laterality: N/A;  pre op: nausea  post op: normal  PCP: Rajan Boss MD    ESSURE TUBAL LIGATION      HYSTERECTOMY  10/13/2020       Outpatient Medications Marked as Taking for the 11/13/24 encounter (Office Visit) with Nicki Cash APRN   Medication Sig Dispense Refill    Ascorbic Acid (Vitamin C) 500 MG capsule Take 1 capsule by mouth Daily.      azelastine (ASTELIN) 0.1 % nasal spray 2 sprays into the nostril(s) as directed by provider 2 (Two) Times a Day. Use in each nostril as directed 30 mL 11    DHEA 10 MG capsule Take 1 capsule by mouth Daily.      fluticasone (FLONASE) 50 MCG/ACT nasal spray 2 sprays into the nostril(s) as directed by provider Daily. 16 g 11    Hormone Cream Base cream Place 1 application on the skin as directed by provider Daily. 80% Estriol/20% Estradiol/0.5 testosterone Compounded at [facility]      hydroxychloroquine (PLAQUENIL) 200 MG tablet Take 1 tablet by mouth Every Night.      IRON CR PO Take 85 mg by mouth 2 (two) times a day.      Magnesium 500 MG tablet Take 1 tablet by mouth 2 (two) times a day.      Myrbetriq 25 MG tablet sustained-release 24 hour 24 hr tablet       Progesterone (PROMETRIUM) 100 MG capsule Take 1 capsule by mouth Daily.      Thyroid 60 MG PO tablet Take 1.5 tablets by mouth Daily.      Vitamin D-Vitamin K (Vitamin K2-Vitamin D3)  MCG-UNIT capsule Take 1 capsule by mouth Daily.      Zinc 50 MG capsule Take 1 capsule by mouth Daily.         Penicillins and Chlorhexidine gluconate    Family History   Problem Relation Age of Onset    Diabetes Father     Colon cancer Maternal Grandfather         In his 60's or 70's    Cancer Maternal Grandfather     Cancer Maternal Aunt         Breast cancer    Cancer Maternal Aunt         Breast Cancer    Cancer Sister         Breast cancer    Liver cancer Neg Hx     Liver disease Neg Hx     Stomach cancer Neg Hx     Colon polyps Neg Hx      Rectal cancer Neg Hx        Social History     Socioeconomic History    Marital status:    Tobacco Use    Smoking status: Never    Smokeless tobacco: Never   Vaping Use    Vaping status: Never Used   Substance and Sexual Activity    Alcohol use: Not Currently    Drug use: Never    Sexual activity: Defer     Partners: Male     Birth control/protection: Surgical     Comment: Essure       Review of Systems   Constitutional: Negative.    HENT:  Positive for hearing loss. Negative for ear pain and tinnitus.        Vitals:    11/13/24 1110   BP: 135/68   Pulse: 57   Temp: 98 °F (36.7 °C)       Body mass index is 24.68 kg/m².    Objective     Physical Exam  Vitals reviewed.   Constitutional:       Appearance: Normal appearance. She is normal weight.   HENT:      Head: Normocephalic.      Right Ear: Tympanic membrane, ear canal and external ear normal.      Left Ear: Tympanic membrane, ear canal and external ear normal.      Nose: Nose normal.      Mouth/Throat:      Lips: Pink.      Mouth: Mucous membranes are moist.      Pharynx: Uvula midline.   Musculoskeletal:      Cervical back: Full passive range of motion without pain.   Neurological:      Mental Status: She is alert.         Assessment & Plan   Diagnoses and all orders for this visit:    1. Sensorineural hearing loss (SNHL) of left ear with unrestricted hearing of right ear (Primary)    2. Allergic rhinitis, unspecified seasonality, unspecified trigger      * Surgery not found *  No orders of the defined types were placed in this encounter.    No follow-ups on file.     Per audiology recommendations will reevaluate hearing in 2 years patient to call with any new or worsening concerns  Patient Instructions   HEARING LOSS       Hearing loss is the third most common health condition in the [place] affecting more than 1 of every 10 people.  This means that over 28 million Americans suffer from hearing loss.  The condition varies with age: 1 out of every 25  children, 1 out of every 14 aged 14-44 years old, 1 out of every 7 adults aged 45-65 year old, and 1 out of every 3 adults over the age of 65 years old.    The ear works by receiving sound vibrations, amplifying the sound, and then converting the mechanical vibration into an electrical signal that is sent to the hearing center of the brain.  The ear has three basic parts, each with very specific functions:  The External Ear   This is made up of the parts of the ear you can see (the auricle), and the ear canal.  These structures function to funnel the sound vibrations down into the ear so that they can be processed.  The Middle Ear  The external ear and the middle ear are  by the eardrum (tympanic membrane).  The middle ear is an air-filled space that houses the middle ear bones (ossicles).  Sound waves hit the eardrum and cause it to vibrate.  The vibrations are then transferred to the ear bones that amplify the sound and transfer it to the inner ear (cochlea).  The Inner Ear  The inner ear is a snail-shaped bone that contains a fluid-filled membrane inside another fluid-filled membrane.  It acts like a battery to transform the mechanical vibrations into and electrical signal.  It does this with very delicate hair cells that rest on top of the inner ear membranes.  The electrical signal is then shipped out of the inner ear to the brain where it is processed and understood as sound.    Disease in any one of these parts of the ear can cause hearing loss, but can do so in two different ways.  When there is a problem with the process of bringing the sound to the inner ear (i.e. problems in the external or middle ear) it is called a conductive hearing loss.  Problems in the inner ear or nerves of hearing care called sensorineural hearing losses.  Often times, however, there is a combination of the types of hearing losses or a mixed hearing loss.  Conductive Hearing Loss  Impairment of the transfer of sound into  the inner ear because of problems with the external ear, the eardrum or the middle ear can cause conductive hearing loss.  Conductive losses can often be lessened or cured with medication or surgery, depending on the etiology.  Causes of Conductive Hearing Losses:  Wax occlusion of the ear canal  Eardrum perforations  Stiffened or scarred eardrums  Fluid in the middle earspace (otitis media with effusion)  Middle ear infections (acute otitis media)  Scarring or fixation of the ear bones (tympanosclerosis, otosclerosis)  Dislocation of the ear bones  Cholesteatoma: this is a “skin cyst” that develops due to severely retracted eardrums or from skin growth into the middle ear from a chronic eardrum perforation.  Over time the cyst can grow and erode the bones of hearing.  Middle ear tumors or masses  Sensorineural Hearing Loss  Deficits in hearing due to problems in the inner ear or nerves of hearing are termed sensorineural losses.  These are usually due to damage to the microscopic hair cells in the cochlea, or due to loss of the nerve cells in the hearing nerve.  For the most part, this type of hearing loss cannot be repaired with medication or surgery, except in rare instances.  Often times, however, it can be helped with hearing aids and rarely with cochlear implantation.  This type of loss can also be associated with tinnitus (ringing of the ears) and vertigo (dizziness).  Causes of Sensorineural Hearing Losses:  Damage from noise exposure  Aging:  this is often a slow, progressive loss of the high frequencies  Infection (labrynthitis)  Secondary loss from the effects of meningitis  Genetic/familial related hearing loss  Autoimmune hearing loss (a rheumatoid-like process)  Temporal bond fracture (severe fracture of the bone around or through the inner ear)  Medication induced damage (chemotherapy, high dose IV antibiotics, diuretics)  Inner ear and skull base tumors (acoustic neuroma, memingioma)        Specific  Causes    Ear Infections and Effusion in Children  Any time the middle ear is filled with fluid, as in an active infection or in middle ear effusion, there can be a conductive hearing.  Ear infections are the most common cause of infant hearing loss and are a very treatable type of hearing loss.  Usually the middle ear is filled with air, which allows the bones of hearing to freely move.  When there is fluid in this space it slows the vibration and is literally like trying to hear underwater.  Most of the time this infection or fluid can be treated with medication.  If this does not happen, often myringotomy tube placement can allow for drainage of fluid and allow the middle ear space to remain ventilated.  Congenital Hearing Loss  Hearing loss is the most common birth defect, affecting 3 in every 1000 children born in the [place].  If untreated, this can lead to significant problems in speech, language, and learning.  Recently, most hospitals have early infant screening that can identify newborns with hearing problems.  With accurate diagnosis, these children can often be treated with hearing aids and other forms of treatment that can allow them to develop normal speech and learning.  Noise Induced Hearing Loss  According to the National Institutes of Health, approximately 10 million Americans have hearing losses that are a least partially attributable to loud noise exposure.  Exposures that can cause permanent damage vary, but short exposures of very loud quality (gunfire) can be as bad as longer exposures at lower levels.  Most conversations are at about 65-70 decibels.  Levels over 85dB, with exposures of up to 8 hours a day, will produce permanent hearing loss after many years of exposure.  A stereo headset turned up full blast (about 110 dB) can cause a significant hearing loss in less than a half an hour.  These losses are due to damage of the hair cells of the inner ear from the excessive vibrational  acoustic trauma of the loud noise.  Since this loss is usually nonreversible, hearing protection is essential.  Foam earplugs can provide 15-30dB of noise protection.  Age Related Hearing Loss (Presbyacusis)  Age related hearing loss is another very common form of hearing loss in the elderly.  It is usually a slowly progressive, high frequency sensorineural loss that in nonreversible.  Not all elderly people will have hearing loss as this is very much related to the genetic makeup of the individual.  This can cause the patient to withdraw from social situations, or cause them to seem to have a mood change because of the frustration of not being able to hear a conversation or having to ask people to repeat things.  Most of the time, this can be well treated by amplifying sounds with a hearing aid.    Hearing loss can have a large impact in the way we function on a day to day basis with our environment.  We are fortunate that most hearing loss can be treated by hearing aids or medical and surgical treatments.  If you think you may be suffering from hearing loss, an evaluation by our doctor can help identify the cause and the specific treatment that can help improve your hearing.

## 2024-12-09 ENCOUNTER — OFFICE VISIT (OUTPATIENT)
Dept: OBGYN CLINIC | Age: 54
End: 2024-12-09
Payer: COMMERCIAL

## 2024-12-09 VITALS
BODY MASS INDEX: 23.82 KG/M2 | DIASTOLIC BLOOD PRESSURE: 80 MMHG | WEIGHT: 166 LBS | HEART RATE: 65 BPM | SYSTOLIC BLOOD PRESSURE: 159 MMHG

## 2024-12-09 DIAGNOSIS — Z01.419 WELL WOMAN EXAM WITH ROUTINE GYNECOLOGICAL EXAM: Primary | ICD-10-CM

## 2024-12-09 DIAGNOSIS — Z12.31 ENCOUNTER FOR SCREENING MAMMOGRAM FOR MALIGNANT NEOPLASM OF BREAST: ICD-10-CM

## 2024-12-09 DIAGNOSIS — N32.81 OAB (OVERACTIVE BLADDER): ICD-10-CM

## 2024-12-09 DIAGNOSIS — N95.2 VAGINAL ATROPHY: ICD-10-CM

## 2024-12-09 DIAGNOSIS — Z12.39 ENCOUNTER FOR SCREENING BREAST EXAMINATION: ICD-10-CM

## 2024-12-09 DIAGNOSIS — Z80.3 FAMILY HISTORY OF BREAST CANCER: ICD-10-CM

## 2024-12-09 DIAGNOSIS — N39.41 URGE INCONTINENCE: ICD-10-CM

## 2024-12-09 PROCEDURE — 99396 PREV VISIT EST AGE 40-64: CPT | Performed by: NURSE PRACTITIONER

## 2024-12-09 RX ORDER — MIRABEGRON 50 MG/1
50 TABLET, FILM COATED, EXTENDED RELEASE ORAL DAILY
Qty: 90 TABLET | Refills: 3 | Status: CANCELLED | OUTPATIENT
Start: 2024-12-09

## 2024-12-09 RX ORDER — ESTRADIOL 0.1 MG/G
1 CREAM VAGINAL
Qty: 1 EACH | Refills: 3 | Status: SHIPPED | OUTPATIENT
Start: 2024-12-09

## 2024-12-09 RX ORDER — ESTRADIOL 0.1 MG/G
1 CREAM VAGINAL
Qty: 1 EACH | Refills: 3 | Status: CANCELLED | OUTPATIENT
Start: 2024-12-09

## 2024-12-09 RX ORDER — MIRABEGRON 50 MG/1
50 TABLET, FILM COATED, EXTENDED RELEASE ORAL DAILY
Qty: 90 TABLET | Refills: 3 | Status: SHIPPED | OUTPATIENT
Start: 2024-12-09

## 2024-12-09 ASSESSMENT — ENCOUNTER SYMPTOMS
RESPIRATORY NEGATIVE: 1
DIARRHEA: 0
EYES NEGATIVE: 1
GASTROINTESTINAL NEGATIVE: 1
ALLERGIC/IMMUNOLOGIC NEGATIVE: 1
CONSTIPATION: 0

## 2024-12-09 NOTE — PROGRESS NOTES
Chanda Wetzel is a 54 y.o. female who presents today for her medical conditions/ complaints as noted below. Chanda Wetzel is c/o of Annual Exam        HPI  Pt presents for well woman exam. Needs order for screening mammogram. PCP draws labs and manages meds. Doing well with HRT pellet therapy and has recently switched to [facility]. Seeing Dr Prieto for thyroid management and doing well. Daughter was recently placed in hospice care last week. Going to bring her home soon for final days. Tearful, reports good support system with family and [employer].     Mammo:3/28/2024  Pap smear:  Contraception:hyst    P:3  Bone density:  Colonoscopy:few years ago   Patient's last menstrual period was 2020.      Past Medical History:   Diagnosis Date    Heavy menses     \"constant\"    Hypothyroidism     PONV (postoperative nausea and vomiting)     RA (rheumatoid arthritis) (HCC)     ra     Past Surgical History:   Procedure Laterality Date    BREAST BIOPSY Right     B9    CHOLECYSTECTOMY      10/20/2021    COLONOSCOPY      per pt within last 3 years from     DILATION AND CURETTAGE OF UTERUS N/A 2020    HYSTEROSCOPY, ENDOMETRIAL ABLATION WITH NOVASURE, DILATION AND CURETTAGE performed by Dorothy Valdez MD at [facility] OR    HYSTERECTOMY (CERVIX STATUS UNKNOWN) N/A 10/13/2020    EXAM UNDER ANESTHESIA [facility] W/ YAEL BILATERAL SALPINGO OOPHORECTOMY CYSTOSCOPY performed by Dorothy Valdze MD at [facility] OR    OTHER SURGICAL HISTORY      had procedure done similiar to tubal that prevents her from having kids    OVARY REMOVAL Bilateral 10/2020     Family History   Problem Relation Age of Onset    Breast Cancer Maternal Aunt 62        breast    Cancer Maternal Grandfather         colon    Cancer Paternal Grandmother         colon    Cancer Paternal Grandfather         colon    Breast Cancer Other         paternal great aunt-breast    Breast Cancer Sister 48    Breast Cancer Maternal

## 2024-12-09 NOTE — PROGRESS NOTES
Pt presents today for pap smear and breast exam.      Mammo:3/28/2024  Pap smear:  Contraception:hyst    P:  Ab:  Bone density:  Colonoscopy:few years ago

## 2025-03-28 ENCOUNTER — TRANSCRIBE ORDERS (OUTPATIENT)
Dept: ADMINISTRATIVE | Facility: HOSPITAL | Age: 55
End: 2025-03-28
Payer: COMMERCIAL

## 2025-03-28 DIAGNOSIS — M81.0 SENILE OSTEOPOROSIS: Primary | ICD-10-CM

## 2025-04-01 ENCOUNTER — HOSPITAL ENCOUNTER (OUTPATIENT)
Dept: WOMENS IMAGING | Age: 55
Discharge: HOME | End: 2025-04-01
Payer: COMMERCIAL

## 2025-04-01 ENCOUNTER — RESULTS FOLLOW-UP (OUTPATIENT)
Dept: OBGYN CLINIC | Age: 55
End: 2025-04-01

## 2025-04-01 VITALS — BODY MASS INDEX: 25.34 KG/M2 | WEIGHT: 177 LBS | HEIGHT: 70 IN

## 2025-04-01 DIAGNOSIS — Z12.31 ENCOUNTER FOR SCREENING MAMMOGRAM FOR MALIGNANT NEOPLASM OF BREAST: ICD-10-CM

## 2025-04-01 PROCEDURE — 77063 BREAST TOMOSYNTHESIS BI: CPT

## 2025-04-18 ENCOUNTER — HOSPITAL ENCOUNTER (OUTPATIENT)
Dept: BONE DENSITY | Facility: HOSPITAL | Age: 55
Discharge: HOME | End: 2025-04-18
Admitting: PHYSICIAN ASSISTANT
Payer: COMMERCIAL

## 2025-04-18 DIAGNOSIS — M81.0 SENILE OSTEOPOROSIS: ICD-10-CM

## 2025-04-18 PROCEDURE — 77080 DXA BONE DENSITY AXIAL: CPT

## 2025-05-14 ENCOUNTER — TRANSCRIBE ORDERS (OUTPATIENT)
Dept: ADMINISTRATIVE | Facility: HOSPITAL | Age: 55
End: 2025-05-14
Payer: COMMERCIAL

## 2025-05-14 DIAGNOSIS — Z13.820 ENCOUNTER FOR SCREENING FOR OSTEOPOROSIS: Primary | ICD-10-CM

## 2025-07-01 ENCOUNTER — TRANSCRIBE ORDERS (OUTPATIENT)
Dept: ADMINISTRATIVE | Facility: HOSPITAL | Age: 55
End: 2025-07-01
Payer: COMMERCIAL

## 2025-07-01 DIAGNOSIS — Z13.820 ENCOUNTER FOR SCREENING FOR OSTEOPOROSIS: Primary | ICD-10-CM

## 2025-07-01 RX ORDER — AZELASTINE 1 MG/ML
2 SPRAY, METERED NASAL 2 TIMES DAILY
Qty: 30 ML | Refills: 11 | Status: SHIPPED | OUTPATIENT
Start: 2025-07-01

## 2025-07-01 RX ORDER — FLUTICASONE PROPIONATE 50 MCG
2 SPRAY, SUSPENSION (ML) NASAL DAILY
Qty: 16 G | Refills: 11 | Status: SHIPPED | OUTPATIENT
Start: 2025-07-01

## 2025-08-13 ENCOUNTER — OFFICE VISIT (OUTPATIENT)
Dept: OTOLARYNGOLOGY | Facility: CLINIC | Age: 55
End: 2025-08-13
Payer: COMMERCIAL

## 2025-08-13 VITALS
BODY MASS INDEX: 20.76 KG/M2 | WEIGHT: 145 LBS | HEIGHT: 70 IN | DIASTOLIC BLOOD PRESSURE: 88 MMHG | SYSTOLIC BLOOD PRESSURE: 117 MMHG

## 2025-08-13 DIAGNOSIS — H92.03 OTALGIA OF BOTH EARS: ICD-10-CM

## 2025-08-13 DIAGNOSIS — J30.9 ALLERGIC RHINITIS, UNSPECIFIED SEASONALITY, UNSPECIFIED TRIGGER: ICD-10-CM

## 2025-08-13 DIAGNOSIS — J34.89 SINUS PRESSURE: ICD-10-CM

## 2025-08-13 DIAGNOSIS — H90.42 SENSORINEURAL HEARING LOSS (SNHL) OF LEFT EAR WITH UNRESTRICTED HEARING OF RIGHT EAR: Primary | ICD-10-CM

## 2025-08-13 RX ORDER — ALPRAZOLAM 0.25 MG
1 TABLET ORAL 2 TIMES DAILY PRN
COMMUNITY

## 2025-08-13 RX ORDER — ESTRADIOL 0.1 MG/G
CREAM VAGINAL
COMMUNITY

## 2025-08-13 RX ORDER — CEFDINIR 300 MG/1
300 CAPSULE ORAL 2 TIMES DAILY
Qty: 20 CAPSULE | Refills: 0 | Status: SHIPPED | OUTPATIENT
Start: 2025-08-13 | End: 2025-08-23

## 2025-08-13 RX ORDER — METHYLPREDNISOLONE 4 MG/1
TABLET ORAL
Qty: 1 EACH | Refills: 0 | Status: SHIPPED | OUTPATIENT
Start: 2025-08-13 | End: 2025-08-19

## (undated) DEVICE — Device: Brand: DEFENDO AIR/WATER/SUCTION AND BIOPSY VALVE

## (undated) DEVICE — TIP COVER ACCESSORY

## (undated) DEVICE — AIRSEAL 8 MM ACCESS PORT AND LOW PROFILE OBTURATOR WITH BLADELESS OPTICAL TIP, 120 MM LENGTH: Brand: AIRSEAL

## (undated) DEVICE — NEEDLE INSUF L150MM DIA2MM DISP FOR PNEUMOPERI ENDOPATH

## (undated) DEVICE — ENDOPATH PNEUMONEEDLE INSUFFLATION NEEDLES WITH LUER LOCK CONNECTORS 120MM: Brand: ENDOPATH

## (undated) DEVICE — GOWN,PREVENTION PLUS,XLN/XL,ST,24/CS: Brand: MEDLINE

## (undated) DEVICE — SOLUTION IV 1000ML 0.9% SOD CHL FOR IRRIG PLAS CONT

## (undated) DEVICE — SUTURE MCRYL SZ 4-0 L18IN ABSRB UD L19MM PS-2 3/8 CIR PRIM Y496G

## (undated) DEVICE — SENSR O2 OXIMAX FNGR A/ 18IN NONSTR

## (undated) DEVICE — SET FLD MGMT CTRL SYS INFLO TB AQUILEX

## (undated) DEVICE — 0 UNI PDO 20CM: Brand: 0 UNI PDO 20CM

## (undated) DEVICE — ANTIBACTERIAL UNDYED BRAIDED (POLYGLACTIN 910), SYNTHETIC ABSORBABLE SUTURE: Brand: COATED VICRYL

## (undated) DEVICE — ARM DRAPE

## (undated) DEVICE — ENDOPATH XCEL WITH OPTIVIEW TECHNOLOGY DILATING TIP TROCARS WITH STABILITY SLEEVES: Brand: ENDOPATH XCEL OPTIVIEW

## (undated) DEVICE — Z DISCONTINUED NO SUB IDED KIT ENDOMET ABLAT IMPED CTRL DEV W/ RF CTRL FTSWCH SUCT LN

## (undated) DEVICE — VAGINAL PREP TRAY: Brand: MEDLINE INDUSTRIES, INC.

## (undated) DEVICE — TRI-LUMEN FILTERED TUBE SET WITH ACTIVATED CHARCOAL FILTER: Brand: AIRSEAL

## (undated) DEVICE — ADHESIVE SKIN CLSR 0.7ML TOP DERMBND ADV

## (undated) DEVICE — YANKAUER,BULB TIP WITH VENT: Brand: ARGYLE

## (undated) DEVICE — SUTURE VCRL SZ 0 L36IN ABSRB UD L36MM CT-1 1/2 CIR J946H

## (undated) DEVICE — PAD,NON-ADHERENT,3X8,STERILE,LF,1/PK: Brand: MEDLINE

## (undated) DEVICE — KIT CANSTR VAC TANTEM TB FOR AQUILEX FLD CTRL SYS

## (undated) DEVICE — PK TURNOVER RM ADV

## (undated) DEVICE — TOWEL,OR,DSP,ST,BLUE,STD,4/PK,20PK/CS: Brand: MEDLINE

## (undated) DEVICE — Z INACTIVE USE 2660664 SOLUTION IRRIG 3000ML 0.9% SOD CHL USP UROMATIC PLAS CONT

## (undated) DEVICE — SEALANT TISS 10 CC FIBRIN VISTASEAL

## (undated) DEVICE — TUBE ET 7MM NSL ORAL BASIC CUF INTMED MURPHY EYE RADPQ MRK

## (undated) DEVICE — SET FLD MGMT OUTFLO TB DISP FOR CTRL SYS AQUILEX

## (undated) DEVICE — 3M™ STERI-STRIP™ REINFORCED ADHESIVE SKIN CLOSURES, R1547, 1/2 IN X 4 IN (12 MM X 100 MM), 6 STRIPS/ENVELOPE: Brand: 3M™ STERI-STRIP™

## (undated) DEVICE — THE CHANNEL CLEANING BRUSH IS A NYLON FLEXI BRUSH ATTACHED TO A FLEXIBLE PLASTIC SHEATH DESIGNED TO SAFELY REMOVE DEBRIS FROM FLEXIBLE ENDOSCOPES.

## (undated) DEVICE — 2, DISPOSABLE SUCTION/IRRIGATOR WITHOUT DISPOSABLE TIP: Brand: STRYKEFLOW

## (undated) DEVICE — ENDOPATH XCEL DILATING TIP TROCARS WITH STABILITY SLEEVES: Brand: ENDOPATH XCEL

## (undated) DEVICE — ENDOPOUCH RETRIEVER SPECIMEN RETRIEVAL BAGS: Brand: ENDOPOUCH RETRIEVER

## (undated) DEVICE — PDS II VLT 0 107CM AG ST3: Brand: ENDOLOOP

## (undated) DEVICE — VESSEL SEALER EXTEND: Brand: ENDOWRIST

## (undated) DEVICE — SURGICAL PROCEDURE PACK GYNECOLOGIC MIN LOURDES HOSP

## (undated) DEVICE — DAVINCI: Brand: MEDLINE INDUSTRIES, INC.

## (undated) DEVICE — SET ENDOSCP SEAL HYSTEROSCOPE RIG OUTFLO CHN DISP MYOSURE

## (undated) DEVICE — SYRINGE MED 10ML TRNSLUC BRL PLUNG BLK MRK POLYPR CTRL

## (undated) DEVICE — GARMENT COMPR STD FOR 17IN CALF UNIF THER FLOTRN

## (undated) DEVICE — SOLUTION IV IRRIG POUR BRL 0.9% SODIUM CHL 2F7124

## (undated) DEVICE — CONMED SCOPE SAVER BITE BLOCK, 20X27 MM: Brand: SCOPE SAVER

## (undated) DEVICE — SOLUTION IV IRRIG WATER 1000ML POUR BRL 2F7114

## (undated) DEVICE — APPLICATOR LAP 35 CM 2 RIGID VISTASEAL

## (undated) DEVICE — HYPODERMIC SAFETY NEEDLE: Brand: MAGELLAN

## (undated) DEVICE — BLADELESS OBTURATOR: Brand: WECK VISTA

## (undated) DEVICE — CUFF,BP,DISP,1 TUBE,ADULT,HP: Brand: MEDLINE

## (undated) DEVICE — LARYNGOSCOPE BLDE MAC HNDL M SZ 35 ST CURAPLEX CURAVIEW LED

## (undated) DEVICE — FRCP BX RADJAW4 NDL 2.8 240 STD OG

## (undated) DEVICE — Device

## (undated) DEVICE — CUFF BLD PRESSURE 1 TUBE AD 25-34 CM ARM VLY FLEXIPORT DISP

## (undated) DEVICE — GLV SURG BIOGEL LTX PF 6 1/2

## (undated) DEVICE — DEVICE TISS REM IU CANSTR VAC TB FT PEDAL DISPOSABLE MYOSURE

## (undated) DEVICE — Y-TYPE TUR/BLADDER IRRIGATION SET, REGULATING CLAMP

## (undated) DEVICE — GOWN,PRECEPT,XLNG/XXLARGE,STRL: Brand: MEDLINE

## (undated) DEVICE — MONOPOLAR METZENBAUM SCISSOR, MINI BLADE TIP, DISPOSABLE: Brand: MONOPOLAR METZENBAUM SCISSOR, MINI BLADE TIP, DISPOSABLE

## (undated) DEVICE — PAD LAP CHOLE: Brand: MEDLINE INDUSTRIES, INC.

## (undated) DEVICE — DRESSING GZ PETRO ST OVERWRAP 5X9IN XRFRM CURAD

## (undated) DEVICE — CANNULA SEAL

## (undated) DEVICE — ENDOPATH XCEL WITH OPTIVIEW TECHNOLOGY UNIVERSAL TROCAR STABILITY SLEEVES: Brand: ENDOPATH XCEL OPTIVIEW

## (undated) DEVICE — MASK,OXYGEN,MED CONC,ADLT,7' TUB, UC: Brand: PENDING

## (undated) DEVICE — ELECTRD L HK EZ CLN 33CM

## (undated) DEVICE — INTENDED TO AID IN THE PASSING OF SUTURES THROUGH BONE AND SOFT TISSUE DURING ORTHOPEDIC SURGERY: Brand: HOFFEE SUTURE RETRIEVER

## (undated) DEVICE — SUTURE SZ 0 27IN 5/8 CIR UR-6  TAPER PT VIOLET ABSRB VICRYL J603H

## (undated) DEVICE — GLOVE SURG SZ 75 CRM LTX FREE POLYISOPRENE POLYMER BEAD ANTI

## (undated) DEVICE — VCARE MEDIUM, UTERINE MANIPULATOR, VAGINAL-CERVICAL-AHLUWALIA'S-RETRACTOR-ELEVATOR: Brand: VCARE

## (undated) DEVICE — SUT VIC 0 SUTUPAK TIES 18IN J906G

## (undated) DEVICE — TBG SMPL FLTR LINE NASL 02/C02 A/ BX/100